# Patient Record
Sex: FEMALE | Race: BLACK OR AFRICAN AMERICAN | Employment: OTHER | URBAN - METROPOLITAN AREA
[De-identification: names, ages, dates, MRNs, and addresses within clinical notes are randomized per-mention and may not be internally consistent; named-entity substitution may affect disease eponyms.]

---

## 2023-02-07 ENCOUNTER — HOSPITAL ENCOUNTER (EMERGENCY)
Age: 73
Discharge: HOME | End: 2023-02-07
Attending: EMERGENCY MEDICINE
Payer: MEDICAID

## 2023-02-07 VITALS
HEART RATE: 79 BPM | RESPIRATION RATE: 13 BRPM | SYSTOLIC BLOOD PRESSURE: 133 MMHG | OXYGEN SATURATION: 93 % | BODY MASS INDEX: 33.31 KG/M2 | HEIGHT: 62 IN | WEIGHT: 181 LBS | TEMPERATURE: 98.2 F | DIASTOLIC BLOOD PRESSURE: 54 MMHG

## 2023-02-07 DIAGNOSIS — M79.605 PAIN OF LEFT LOWER EXTREMITY: Primary | ICD-10-CM

## 2023-02-07 LAB
ALBUMIN SERPL-MCNC: 3.2 G/DL (ref 3.4–5)
ALBUMIN/GLOB SERPL: 0.7 (ref 0.8–1.7)
ALP SERPL-CCNC: 61 U/L (ref 45–117)
ALT SERPL-CCNC: 12 U/L (ref 13–56)
ANION GAP SERPL CALC-SCNC: 6 MMOL/L (ref 3–18)
AST SERPL-CCNC: 9 U/L (ref 10–38)
BASOPHILS # BLD: 0 K/UL (ref 0–0.1)
BASOPHILS NFR BLD: 0 % (ref 0–2)
BILIRUB SERPL-MCNC: 0.3 MG/DL (ref 0.2–1)
BUN SERPL-MCNC: 16 MG/DL (ref 7–18)
BUN/CREAT SERPL: 16 (ref 12–20)
CALCIUM SERPL-MCNC: 8.9 MG/DL (ref 8.5–10.1)
CHLORIDE SERPL-SCNC: 106 MMOL/L (ref 100–111)
CO2 SERPL-SCNC: 27 MMOL/L (ref 21–32)
CREAT SERPL-MCNC: 0.99 MG/DL (ref 0.6–1.3)
DIFFERENTIAL METHOD BLD: ABNORMAL
EOSINOPHIL # BLD: 0.3 K/UL (ref 0–0.4)
EOSINOPHIL NFR BLD: 6 % (ref 0–5)
ERYTHROCYTE [DISTWIDTH] IN BLOOD BY AUTOMATED COUNT: 15.8 % (ref 11.6–14.5)
GLOBULIN SER CALC-MCNC: 4.7 G/DL (ref 2–4)
GLUCOSE SERPL-MCNC: 121 MG/DL (ref 74–99)
HCT VFR BLD AUTO: 30.4 % (ref 35–45)
HGB BLD-MCNC: 9.6 G/DL (ref 12–16)
IMM GRANULOCYTES # BLD AUTO: 0 K/UL (ref 0–0.04)
IMM GRANULOCYTES NFR BLD AUTO: 0 % (ref 0–0.5)
INR PPP: 3.1 (ref 0.8–1.2)
LYMPHOCYTES # BLD: 2.2 K/UL (ref 0.9–3.6)
LYMPHOCYTES NFR BLD: 45 % (ref 21–52)
MCH RBC QN AUTO: 24.6 PG (ref 24–34)
MCHC RBC AUTO-ENTMCNC: 31.6 G/DL (ref 31–37)
MCV RBC AUTO: 77.7 FL (ref 78–100)
MONOCYTES # BLD: 0.7 K/UL (ref 0.05–1.2)
MONOCYTES NFR BLD: 14 % (ref 3–10)
NEUTS SEG # BLD: 1.7 K/UL (ref 1.8–8)
NEUTS SEG NFR BLD: 35 % (ref 40–73)
NRBC # BLD: 0 K/UL (ref 0–0.01)
NRBC BLD-RTO: 0 PER 100 WBC
PLATELET # BLD AUTO: 214 K/UL (ref 135–420)
PLATELET COMMENTS,PCOM: ABNORMAL
PMV BLD AUTO: 11.8 FL (ref 9.2–11.8)
POTASSIUM SERPL-SCNC: 4.2 MMOL/L (ref 3.5–5.5)
PROT SERPL-MCNC: 7.9 G/DL (ref 6.4–8.2)
PROTHROMBIN TIME: 32.5 SEC (ref 11.5–15.2)
RBC # BLD AUTO: 3.91 M/UL (ref 4.2–5.3)
RBC MORPH BLD: ABNORMAL
SODIUM SERPL-SCNC: 139 MMOL/L (ref 136–145)
WBC # BLD AUTO: 4.9 K/UL (ref 4.6–13.2)

## 2023-02-07 PROCEDURE — 74011000250 HC RX REV CODE- 250: Performed by: EMERGENCY MEDICINE

## 2023-02-07 PROCEDURE — 96374 THER/PROPH/DIAG INJ IV PUSH: CPT

## 2023-02-07 PROCEDURE — 80053 COMPREHEN METABOLIC PANEL: CPT

## 2023-02-07 PROCEDURE — 85025 COMPLETE CBC W/AUTO DIFF WBC: CPT

## 2023-02-07 PROCEDURE — 74011250636 HC RX REV CODE- 250/636: Performed by: EMERGENCY MEDICINE

## 2023-02-07 PROCEDURE — 99284 EMERGENCY DEPT VISIT MOD MDM: CPT

## 2023-02-07 PROCEDURE — 85610 PROTHROMBIN TIME: CPT

## 2023-02-07 RX ORDER — LISINOPRIL 10 MG/1
20 TABLET ORAL 2 TIMES DAILY
COMMUNITY

## 2023-02-07 RX ORDER — AMLODIPINE BESYLATE 5 MG/1
TABLET ORAL DAILY
COMMUNITY

## 2023-02-07 RX ORDER — HYDROCODONE BITARTRATE AND ACETAMINOPHEN 5; 325 MG/1; MG/1
1 TABLET ORAL
Qty: 12 TABLET | Refills: 0 | Status: SHIPPED | OUTPATIENT
Start: 2023-02-07 | End: 2023-02-10

## 2023-02-07 RX ORDER — LIDOCAINE 4 G/100G
1 PATCH TOPICAL
Status: DISCONTINUED | OUTPATIENT
Start: 2023-02-07 | End: 2023-02-07 | Stop reason: HOSPADM

## 2023-02-07 RX ORDER — MORPHINE SULFATE 4 MG/ML
4 INJECTION INTRAVENOUS
Status: COMPLETED | OUTPATIENT
Start: 2023-02-07 | End: 2023-02-07

## 2023-02-07 RX ADMIN — MORPHINE SULFATE 4 MG: 4 INJECTION, SOLUTION INTRAMUSCULAR; INTRAVENOUS at 10:57

## 2023-02-07 NOTE — ED TRIAGE NOTES
70 YO F presents to the ED C/O pain behind her left knee for 2 weeks. She has Hx blood clots in her L leg last a year ago. She had it \"ballooned\" at that time and states that she has had a filter in for 20 years. Pt came to the area from [place] via bus just before Sx onset. The pain worsened yesterday.

## 2023-02-07 NOTE — ED NOTES
Discharge instructions reviewed with patient. Patient verbalized understanding. Patient advised to follow up as directed on discharge instructions. Patient denies questions, needs or concerns at this time. Patient verbalized understanding. No s/sx of distress noted. Patient is requesting to go home by Medical transport. Patient has been informed that she will incur the cost of medical transport.

## 2023-02-07 NOTE — ED PROVIDER NOTES
67-year-old female past medical history of diabetes hypertension and DVT to left lower extremity presents the emergency department with leg pain. Patient is on Xarelto states she is compliant. Patient recently drove up from [place]. She fell today and was concerned because she had leg pain. Patient states pain is worse with movement better with rest much with medications no other issues expressed. Past Medical History:   Diagnosis Date    Diabetes ([address]     Hypertension     Thromboembolus ([address]        Past Surgical History:   Procedure Laterality Date    HX ORTHOPAEDIC Right     R foot surgery    FL UNLISTED PROCEDURE VASCULAR SURGERY      IVC filter and LLE stent         Family History:   Problem Relation Age of Onset    Hypertension Other        Social History     Socioeconomic History    Marital status:      Spouse name: Not on file    Number of children: Not on file    Years of education: Not on file    Highest education level: Not on file   Occupational History    Not on file   Tobacco Use    Smoking status: Former     Types: Cigarettes     Quit date: 2/15/2008     Years since quittin.9    Smokeless tobacco: Never   Vaping Use    Vaping Use: Never used   Substance and Sexual Activity    Alcohol use: Yes     Alcohol/week: 2.0 standard drinks     Types: 2 Drinks containing 0.5 oz of alcohol per week    Drug use: Never    Sexual activity: Not on file   Other Topics Concern    Not on file   Social History Narrative    Not on file     Social Determinants of Health     Financial Resource Strain: Not on file   Food Insecurity: Not on file   Transportation Needs: Not on file   Physical Activity: Not on file   Stress: Not on file   Social Connections: Not on file   Intimate Partner Violence: Not on file   Housing Stability: Not on file         ALLERGIES: Patient has no known allergies. Review of Systems   Respiratory: Negative. Cardiovascular: Negative. Neurological: Negative.     All other systems reviewed and are negative. Vitals:    02/07/23 1027 02/07/23 1027   BP:  (!) 133/54   Pulse:  84   Resp:  16   Temp:  98.2 °F (36.8 °C)   SpO2:  100%   Weight: 82.1 kg (181 lb) 82.1 kg (181 lb)   Height: 5' 2\" (1.575 m) 5' 2\" (1.575 m)            Physical Exam  Vitals and nursing note reviewed. Constitutional:       General: She is not in acute distress. Appearance: Normal appearance. She is not ill-appearing, toxic-appearing or diaphoretic. HENT:      Head: Normocephalic and atraumatic. Nose: Nose normal.   Eyes:      Extraocular Movements: Extraocular movements intact. Cardiovascular:      Rate and Rhythm: Normal rate and regular rhythm. Pulmonary:      Effort: Pulmonary effort is normal.      Breath sounds: Normal breath sounds. Abdominal:      General: There is no distension. Palpations: Abdomen is soft. There is no mass. Tenderness: There is no abdominal tenderness. There is no right CVA tenderness, left CVA tenderness, guarding or rebound. Hernia: No hernia is present. Musculoskeletal:         General: Normal range of motion. Cervical back: Normal range of motion and neck supple. Skin:     General: Skin is warm. Capillary Refill: Capillary refill takes less than 2 seconds. Coloration: Skin is not jaundiced or pale. Findings: No bruising, erythema, lesion or rash. Neurological:      General: No focal deficit present. Mental Status: She is alert and oriented to person, place, and time. Psychiatric:         Mood and Affect: Mood normal.         Behavior: Behavior normal.        Medical Decision Making  Patient is much improved after lidocaine patch and morphine. Labs unremarkable patient has no gross deformity we will discharge her home. Amount and/or Complexity of Data Reviewed  Labs: ordered. Risk  OTC drugs. Prescription drug management.            Procedures

## 2023-04-19 PROBLEM — Z00.00 ENCOUNTER FOR PREVENTIVE HEALTH EXAMINATION: Status: ACTIVE | Noted: 2023-04-19

## 2023-04-20 ENCOUNTER — OUTPATIENT (OUTPATIENT)
Dept: OUTPATIENT SERVICES | Facility: HOSPITAL | Age: 73
LOS: 1 days | End: 2023-04-20
Payer: MEDICARE

## 2023-04-20 ENCOUNTER — RESULT REVIEW (OUTPATIENT)
Age: 73
End: 2023-04-20

## 2023-04-20 ENCOUNTER — APPOINTMENT (OUTPATIENT)
Dept: ORTHOPEDIC SURGERY | Facility: CLINIC | Age: 73
End: 2023-04-20
Payer: MEDICARE

## 2023-04-20 VITALS
HEART RATE: 83 BPM | SYSTOLIC BLOOD PRESSURE: 146 MMHG | HEIGHT: 62 IN | OXYGEN SATURATION: 100 % | BODY MASS INDEX: 32.57 KG/M2 | DIASTOLIC BLOOD PRESSURE: 72 MMHG | WEIGHT: 177 LBS

## 2023-04-20 DIAGNOSIS — Z79.52 LONG TERM (CURRENT) USE OF SYSTEMIC STEROIDS: ICD-10-CM

## 2023-04-20 DIAGNOSIS — M87.00 IDIOPATHIC ASEPTIC NECROSIS OF UNSPECIFIED BONE: ICD-10-CM

## 2023-04-20 DIAGNOSIS — D68.59 OTHER PRIMARY THROMBOPHILIA: ICD-10-CM

## 2023-04-20 DIAGNOSIS — I82.512 CHRONIC EMBOLISM AND THROMBOSIS OF LEFT FEMORAL VEIN: ICD-10-CM

## 2023-04-20 PROCEDURE — 99204 OFFICE O/P NEW MOD 45 MIN: CPT

## 2023-04-20 PROCEDURE — 73564 X-RAY EXAM KNEE 4 OR MORE: CPT | Mod: 26,50

## 2023-04-20 PROCEDURE — 73564 X-RAY EXAM KNEE 4 OR MORE: CPT

## 2023-04-23 NOTE — ASSESSMENT
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 \par Follow up after MRI

## 2023-04-23 NOTE — HISTORY OF PRESENT ILLNESS
[de-identified] : CHAVO MINER is a 72 year old female who presents with left knee pain.\par States the onset of pain was several years ago with recent exacerbation over 1 month.\par No antecedent trauma or injury. \par Had several visits to ED at [facility].\par Hx of chronic DVT with recent thrombectomy,  IVC filter removal and stent placement.\par No Hx of sickle cell, HIV.\par Hx asthma requiring chronic steroid treatment. Now managed with Dupixent.\par Pain is anteromedial \par Pain is nonradiating.\par There is associated swelling, stiffness\par There is no associated numbness, paraesthesia or weakness.\par Exacerbating factors are standing, walking for prolonged periods, climbing stairs, descending stairs, rising from seated position.\par Taking Percocet 5-325mg TID \par Has Pain Management appointment in 1 week\par Patient ambulates with cane.\par

## 2023-04-23 NOTE — PHYSICAL EXAM
[de-identified] : General: Well-nourished, well-developed, alert, and in no acute distress.\par Head: Normocephalic.\par Eyes: Pupils equal, extraocular muscles intact, normal sclera.\par Nose: No nasal discharge.\par Cardiovascular: Extremities are warm and well perfused. Distal pulses are symmetric bilaterally.\par Respiratory: No labored breathing.\par Extremities: Sensation is intact distally bilaterally. Distal pulses are symmetric bilaterally\par Lymphatic: No regional lymphadenopathy, no lymphedema\par Neurologic: No focal deficits\par Skin: Normal skin color, texture, and turgor\par Psychiatric: Normal affect \par \par MSK:\par Examination of left knee:\par \par Gait antalgic\par Moderate effusion\par No erythema, hematoma or skin lesion\par Tender to palpation:medial joint line, lateral joint line, popliteal fossa\par Nontender to palpation:  quad tendon, patellar tendon\par No warmth\par No Baker's cyst palpable\par ROM: 5-90\par Moderate patellar crepitus\par \par Log roll negative\par Lachman negative\par Anterior drawer negative\par Posterior drawer negative\par Varus/valgus stress negative at 0 and 30 deg\par Shabbir  negative\par Patellar grind negative\par \par \par Examination of right knee:\par \par No effusion, erythema, hematoma or skin lesion\par Nontender to palpation: medial joint line, lateral joint line, medial patellar facet, lateral patellar facet, quad tendon, patellar tendon, pes, Gerdy's tubercle, tibial tuberosity, popliteal fossa, hamstrings, ITB \par No warmth\par No Baker's cyst palpable\par ROM: 0-110\par No patellar crepitus\par \par Log roll negative\par Lachman negative\par Anterior drawer negative\par Posterior drawer negative\par Varus/valgus stress negative at 0 and 30 deg\par Shabbir  negative\par Patellar grind negative \par \par Sensation is intact to light touch over the superficial and deep peroneal nerve distributions and the posterior tibial nerve distribution. Capillary refill is less than two seconds. Posterior tibial and dorsalis pedis pulses 2+ equal bilaterally. No calf swelling or tenderness bilaterally. Strength testing shows  Hip flexion 5/5, Hip abduction 5/5, Hip abduction 5/5, Knee Extension 5/5, Knee Flexion 5/5, dorsiflexion 5/5, plantar flexion 5/5, EHL 5/5\par Reflexes: Patellar 2+, Achilles 2+\par   [de-identified] : XR bilateral knee (4/20/23): There is no evidence of fracture or dislocation. Bilateral femoral and tibial bone infarcts. Medial joint space narrowing with subchondral sclerosis and osteophytosis. There are superior patellar enthesophytes.

## 2023-04-28 ENCOUNTER — FORM ENCOUNTER (OUTPATIENT)
Age: 73
End: 2023-04-28

## 2023-04-28 VITALS
DIASTOLIC BLOOD PRESSURE: 84 MMHG | OXYGEN SATURATION: 99 % | TEMPERATURE: 98 F | WEIGHT: 171.96 LBS | HEART RATE: 91 BPM | RESPIRATION RATE: 16 BRPM | SYSTOLIC BLOOD PRESSURE: 168 MMHG

## 2023-04-28 PROCEDURE — 93971 EXTREMITY STUDY: CPT | Mod: 26,LT

## 2023-04-28 PROCEDURE — 99285 EMERGENCY DEPT VISIT HI MDM: CPT

## 2023-04-28 NOTE — ED PROVIDER NOTE - PHYSICAL EXAMINATION
General:  Well appearing, no distress  HEENT:  No conjunctival injection, neck supple, no congestion   Chest:  Non-tender, no crepitance  Lungs:  Clear to auscultation bilaterally   Heart:  s1s2 normal, no murmur  Abdomen:  soft, non-tender, non-distended  :  Deferred  Rectal:  Deferred  Extremities: Mild ble edema.  Patient reacts in pain with even very light touch to all areas of LLE.  There is no erythema, distal pulses intact   Neuro:  Alert, conversant, motor/sensory grossly intact

## 2023-04-28 NOTE — ED ADULT NURSE NOTE - NSIMPLEMENTINTERV_GEN_ALL_ED
Implemented All Fall with Harm Risk Interventions:  [place] to call system. Call bell, personal items and telephone within reach. Instruct patient to call for assistance. Room bathroom lighting operational. Non-slip footwear when patient is off stretcher. Physically safe environment: no spills, clutter or unnecessary equipment. Stretcher in lowest position, wheels locked, appropriate side rails in place. Provide visual cue, wrist band, yellow gown, etc. Monitor gait and stability. Monitor for mental status changes and reorient to person, place, and time. Review medications for side effects contributing to fall risk. Reinforce activity limits and safety measures with patient and family. Provide visual clues: red socks.

## 2023-04-28 NOTE — ED PROVIDER NOTE - PROGRESS NOTE DETAILS
Director - pt received from night team pending vasc consult recs.  Pt sleeping comfortably.  VS, labs, imaging reviewed.  Await vasc. Hugo - Vascular stating area of superficial stenosis visualized on CTa is likely not source of pt's pain as she is experiencing symptoms primarily in the thigh. State pt has good perfusion in the foot, there is no concern for complete occlusion. Recommend ortho consult for bone infarct, CT notable for "serpiginous sclerotic densities are again seen within the left femur and proximal and distal tibia, consistent with bone infarct." Hugo - Discussed with orthopedics, will evaluate pt and provide recommendations. Requesting XR pelvis, L hip and L femur. Huog - Reached out to Ortho for plan. Have not evaluated pt but will see later today in ED. Unable to provide further recs at this time. Hugo - Ortho feels pain is likely related to underlying bony infarcts, feel this could be related to long term steroid use vs malignancy. Pt previously evaluated in ortho clinic last week and referred for MR L knee and hematology evaluation. Pt reassessed and c/o persistent pain. She is unable to ambulate despite multiple doses of morphine. She lives alone and is typically independent in ADLs, does not use an assistive device. Has been taking percocet without relief. Discussed with pt and will admit to Medicine for intractable pain, case management evaluation.

## 2023-04-28 NOTE — ED PROVIDER NOTE - OBJECTIVE STATEMENT
Patient PMH DVT LLE in february after a bus trip  The first week of March, she had a procedure at [place] to remove the clot  The third week of March the pain started again and has been worse since the first week in April  She last saw the surgeon 2 weeks after she was discharged.  She was supposed to have another sonogram but couldn't because of insurance   Last week she went to  and the person who saw her told her that she could have a problem with the circulation to the bone    Accordingly, she saw Orthopedist Dr. Menchaca and was told she needed to have an MRI and to also see pain management  She was given oxycodone pending her pain mgmt appointment  She reports that the appointment was supposed to be today, but that it was rescheduled for May 11th and so she came to the ER for pain mgmt.  She has no cp, no sob, no abd pain  No fever, cough, cold symptoms  No color change, numbness or weakness to the LLE

## 2023-04-28 NOTE — ED ADULT TRIAGE NOTE - CHIEF COMPLAINT QUOTE
Pt c/o left knee pain since DVT surgery in March. Pt states, "they told me I needed to come because the blood is not flowing to the bone." Denies chest pain, shortness of breath.

## 2023-04-28 NOTE — ED ADULT NURSE NOTE - OBJECTIVE STATEMENT
73y female c/o L knee pain. states she had DVT surgery in march after having a "blood clot for 22 years." pt states, "I was seen at urgent care and they told me there is no blood flow to my bone." states she has not been able to ambulate in 3 weeks. on xarelto. denies CP, SOB, n/v/d, HA, numbness/tingling, f/c. No acute distress noted at this time.

## 2023-04-28 NOTE — ED PROVIDER NOTE - CLINICAL SUMMARY MEDICAL DECISION MAKING FREE TEXT BOX
Patient with DVT with clot extraction and ongoing pain.  Will reassess with repeat sono and give pain medications.

## 2023-04-29 ENCOUNTER — INPATIENT (INPATIENT)
Facility: HOSPITAL | Age: 73
LOS: 3 days | Discharge: HOME HEALTH | DRG: 554 | End: 2023-05-03
Attending: INTERNAL MEDICINE | Admitting: GENERAL ACUTE CARE HOSPITAL
Payer: MEDICARE

## 2023-04-29 ENCOUNTER — FORM ENCOUNTER (OUTPATIENT)
Age: 73
End: 2023-04-29

## 2023-04-29 DIAGNOSIS — D64.9 ANEMIA, UNSPECIFIED: ICD-10-CM

## 2023-04-29 DIAGNOSIS — I82.409 ACUTE EMBOLISM AND THROMBOSIS OF UNSPECIFIED DEEP VEINS OF UNSPECIFIED LOWER EXTREMITY: ICD-10-CM

## 2023-04-29 DIAGNOSIS — M87.00 IDIOPATHIC ASEPTIC NECROSIS OF UNSPECIFIED BONE: ICD-10-CM

## 2023-04-29 DIAGNOSIS — I73.9 PERIPHERAL VASCULAR DISEASE, UNSPECIFIED: ICD-10-CM

## 2023-04-29 DIAGNOSIS — J45.909 UNSPECIFIED ASTHMA, UNCOMPLICATED: ICD-10-CM

## 2023-04-29 DIAGNOSIS — Z98.891 HISTORY OF UTERINE SCAR FROM PREVIOUS SURGERY: Chronic | ICD-10-CM

## 2023-04-29 DIAGNOSIS — Z29.9 ENCOUNTER FOR PROPHYLACTIC MEASURES, UNSPECIFIED: ICD-10-CM

## 2023-04-29 DIAGNOSIS — I10 ESSENTIAL (PRIMARY) HYPERTENSION: ICD-10-CM

## 2023-04-29 DIAGNOSIS — E11.9 TYPE 2 DIABETES MELLITUS WITHOUT COMPLICATIONS: ICD-10-CM

## 2023-04-29 LAB — GLUCOSE BLDC GLUCOMTR-MCNC: 110 MG/DL — HIGH (ref 70–99)

## 2023-04-29 PROCEDURE — 73502 X-RAY EXAM HIP UNI 2-3 VIEWS: CPT | Mod: 26,LT

## 2023-04-29 PROCEDURE — 73706 CT ANGIO LWR EXTR W/O&W/DYE: CPT | Mod: 26,LT,MA

## 2023-04-29 PROCEDURE — 73701 CT LOWER EXTREMITY W/DYE: CPT | Mod: 26,59,LT,MA

## 2023-04-29 PROCEDURE — 99222 1ST HOSP IP/OBS MODERATE 55: CPT

## 2023-04-29 PROCEDURE — 73552 X-RAY EXAM OF FEMUR 2/>: CPT | Mod: 26,LT

## 2023-04-29 PROCEDURE — 99222 1ST HOSP IP/OBS MODERATE 55: CPT | Mod: GC

## 2023-04-29 RX ORDER — SENNA PLUS 8.6 MG/1
2 TABLET ORAL AT BEDTIME
Refills: 0 | Status: DISCONTINUED | OUTPATIENT
Start: 2023-04-29 | End: 2023-05-03

## 2023-04-29 RX ORDER — SODIUM CHLORIDE 9 MG/ML
1000 INJECTION, SOLUTION INTRAVENOUS
Refills: 0 | Status: DISCONTINUED | OUTPATIENT
Start: 2023-04-29 | End: 2023-05-03

## 2023-04-29 RX ORDER — DEXTROSE 50 % IN WATER 50 %
25 SYRINGE (ML) INTRAVENOUS ONCE
Refills: 0 | Status: DISCONTINUED | OUTPATIENT
Start: 2023-04-29 | End: 2023-05-03

## 2023-04-29 RX ORDER — ACETAMINOPHEN 500 MG
650 TABLET ORAL EVERY 6 HOURS
Refills: 0 | Status: DISCONTINUED | OUTPATIENT
Start: 2023-04-29 | End: 2023-04-30

## 2023-04-29 RX ORDER — TIOTROPIUM BROMIDE 18 UG/1
2 CAPSULE ORAL; RESPIRATORY (INHALATION) DAILY
Refills: 0 | Status: DISCONTINUED | OUTPATIENT
Start: 2023-04-29 | End: 2023-05-03

## 2023-04-29 RX ORDER — ATORVASTATIN CALCIUM 80 MG/1
40 TABLET, FILM COATED ORAL AT BEDTIME
Refills: 0 | Status: DISCONTINUED | OUTPATIENT
Start: 2023-04-29 | End: 2023-05-03

## 2023-04-29 RX ORDER — LISINOPRIL 2.5 MG/1
40 TABLET ORAL EVERY 24 HOURS
Refills: 0 | Status: DISCONTINUED | OUTPATIENT
Start: 2023-04-29 | End: 2023-05-03

## 2023-04-29 RX ORDER — MORPHINE SULFATE 50 MG/1
4 CAPSULE, EXTENDED RELEASE ORAL ONCE
Refills: 0 | Status: DISCONTINUED | OUTPATIENT
Start: 2023-04-29 | End: 2023-04-29

## 2023-04-29 RX ORDER — GLUCAGON INJECTION, SOLUTION 0.5 MG/.1ML
1 INJECTION, SOLUTION SUBCUTANEOUS ONCE
Refills: 0 | Status: DISCONTINUED | OUTPATIENT
Start: 2023-04-29 | End: 2023-05-03

## 2023-04-29 RX ORDER — BUDESONIDE AND FORMOTEROL FUMARATE DIHYDRATE 160; 4.5 UG/1; UG/1
2 AEROSOL RESPIRATORY (INHALATION)
Refills: 0 | Status: DISCONTINUED | OUTPATIENT
Start: 2023-04-29 | End: 2023-05-03

## 2023-04-29 RX ORDER — POLYETHYLENE GLYCOL 3350 17 G/17G
17 POWDER, FOR SOLUTION ORAL
Refills: 0 | Status: DISCONTINUED | OUTPATIENT
Start: 2023-04-29 | End: 2023-05-03

## 2023-04-29 RX ORDER — INSULIN LISPRO 100/ML
VIAL (ML) SUBCUTANEOUS
Refills: 0 | Status: DISCONTINUED | OUTPATIENT
Start: 2023-04-29 | End: 2023-05-03

## 2023-04-29 RX ORDER — AMLODIPINE BESYLATE 2.5 MG/1
5 TABLET ORAL EVERY 24 HOURS
Refills: 0 | Status: DISCONTINUED | OUTPATIENT
Start: 2023-04-30 | End: 2023-05-03

## 2023-04-29 RX ORDER — DEXTROSE 50 % IN WATER 50 %
15 SYRINGE (ML) INTRAVENOUS ONCE
Refills: 0 | Status: DISCONTINUED | OUTPATIENT
Start: 2023-04-29 | End: 2023-05-03

## 2023-04-29 RX ORDER — ALBUTEROL 90 UG/1
2 AEROSOL, METERED ORAL EVERY 6 HOURS
Refills: 0 | Status: DISCONTINUED | OUTPATIENT
Start: 2023-04-29 | End: 2023-05-03

## 2023-04-29 RX ORDER — DEXTROSE 50 % IN WATER 50 %
12.5 SYRINGE (ML) INTRAVENOUS ONCE
Refills: 0 | Status: DISCONTINUED | OUTPATIENT
Start: 2023-04-29 | End: 2023-05-03

## 2023-04-29 RX ORDER — RIVAROXABAN 15 MG-20MG
20 KIT ORAL DAILY
Refills: 0 | Status: DISCONTINUED | OUTPATIENT
Start: 2023-04-29 | End: 2023-05-03

## 2023-04-29 RX ADMIN — RIVAROXABAN 20 MILLIGRAM(S): KIT at 21:19

## 2023-04-29 RX ADMIN — MORPHINE SULFATE 4 MILLIGRAM(S): 50 CAPSULE, EXTENDED RELEASE ORAL at 09:05

## 2023-04-29 RX ADMIN — MORPHINE SULFATE 4 MILLIGRAM(S): 50 CAPSULE, EXTENDED RELEASE ORAL at 16:34

## 2023-04-29 RX ADMIN — MORPHINE SULFATE 4 MILLIGRAM(S): 50 CAPSULE, EXTENDED RELEASE ORAL at 13:35

## 2023-04-29 RX ADMIN — MORPHINE SULFATE 4 MILLIGRAM(S): 50 CAPSULE, EXTENDED RELEASE ORAL at 17:08

## 2023-04-29 RX ADMIN — SENNA PLUS 2 TABLET(S): 8.6 TABLET ORAL at 21:20

## 2023-04-29 RX ADMIN — LISINOPRIL 40 MILLIGRAM(S): 2.5 TABLET ORAL at 21:20

## 2023-04-29 RX ADMIN — MORPHINE SULFATE 4 MILLIGRAM(S): 50 CAPSULE, EXTENDED RELEASE ORAL at 04:32

## 2023-04-29 RX ADMIN — ATORVASTATIN CALCIUM 40 MILLIGRAM(S): 80 TABLET, FILM COATED ORAL at 21:20

## 2023-04-29 RX ADMIN — MORPHINE SULFATE 4 MILLIGRAM(S): 50 CAPSULE, EXTENDED RELEASE ORAL at 00:38

## 2023-04-29 RX ADMIN — MORPHINE SULFATE 4 MILLIGRAM(S): 50 CAPSULE, EXTENDED RELEASE ORAL at 09:37

## 2023-04-29 RX ADMIN — MORPHINE SULFATE 4 MILLIGRAM(S): 50 CAPSULE, EXTENDED RELEASE ORAL at 13:00

## 2023-04-29 NOTE — H&P ADULT - PROBLEM SELECTOR PLAN 4
History of many asthma exacerbations in the past with a lot of steroid use. Patient reports that she is now in remission since she stopped smoking 10 years ago.  Home meds: albuterol PRN, trelegy  - continue home meds Hgb 8.2 on admission w/ low MCV. Unknown baseline. Per patient she has heard of having a low blood count in the past but does not know why.  - f/u iron panel

## 2023-04-29 NOTE — H&P ADULT - NSICDXPASTMEDICALHX_GEN_ALL_CORE_FT
PAST MEDICAL HISTORY:  Asthma     DVT, lower extremity     HTN (hypertension)     PAD (peripheral artery disease)

## 2023-04-29 NOTE — H&P ADULT - PROBLEM SELECTOR PLAN 7
F: PO intake  E: replete as needed  N: kosher diet   DVT: xarelto 20mg daily Home med: metformin 1000mg daily. Patient reports that she does not have diabetes, she was just put on metformin because she has been on steroids so much in the past that her sugar used to be elevated.  - mISS  - f/u A1C

## 2023-04-29 NOTE — H&P ADULT - PROBLEM SELECTOR PLAN 6
Home med: metformin 1000mg daily. Patient reports that she does not have diabetes, she was just put on metformin because she has been on steroids so much in the past that her sugar used to be elevated.  - mISS  - f/u A1C Home meds: lisinopril 40mg qd, amlodipine 5mg qd  - continue home meds

## 2023-04-29 NOTE — H&P ADULT - PROBLEM SELECTOR PLAN 1
CT LE showing bone infarct in b/l femurs and tibias. CTA LLE showing severe atherosclerotic disease in L superficial femoral artery. Pain 2/2 bone infarcts vs atherosclerotic disease. Ortho and vascular consulted in ED.   Per ED note, ortho feels bone infarcts caused by long term steroid use (lots of steroid use in asthma hx) vs malignancy. Vascular feels the superficial stenosis is unlikely to be cause of pain.   - pain management: tylenol 650mg PO for mild pain, oxycodone 5mg/325mg q6 PRN for moderate pain, oxycodone 10mg q6 PRN for severe pain  - f/u ortho recs  - PT consult CT LE showing bone infarct in b/l femurs and tibias. CTA LLE showing severe atherosclerotic disease in L superficial femoral artery. Pain 2/2 bone infarcts vs atherosclerotic disease. Ortho and vascular consulted in ED.   Per ED note, ortho feels bone infarcts caused by long term steroid use (lots of steroid use in asthma hx) vs malignancy. Vascular feels the superficial stenosis is unlikely to be cause of pain.   Consider malignancy vs hypercoaguability  - pain management: tylenol 650mg PO for mild pain, oxycodone 5mg/325mg q6 PRN for moderate pain, oxycodone 10mg q6 PRN for severe pain  - f/u ortho recs  - PT consult  - collateral on cancer screenings, per patient colonoscopy was many years back. mammogram?  - hem onc consult in am for hypercoaguable w/u

## 2023-04-29 NOTE — H&P ADULT - NSHPREVIEWOFSYSTEMS_GEN_ALL_CORE
REVIEW OF SYSTEMS:  CONSTITUTIONAL: No weakness, fevers, chills, changes in weight  EYES/ENT: No visual changes;  No vertigo or throat pain   NECK: No pain or stiffness  RESPIRATORY: No cough, wheezing, hemoptysis; No shortness of breath  CARDIOVASCULAR: no chest pain or palpitations  GASTROINTESTINAL: No abdominal or epigastric pain. No nausea, vomiting, or hematemesis; No diarrhea or constipation. No melena or hematochezia.  GENITOURINARY: No dysuria, frequency or hematuria  NEUROLOGICAL: No numbness or weakness  SKIN: No itching, rashes

## 2023-04-29 NOTE — H&P ADULT - HISTORY OF PRESENT ILLNESS
Patient is a 72 y/o F w/ PMH of DVT s/p IVC filter then clot and IVC filter removal, asthma and HTN  Patient is a 72 y/o F w/ PMH of DVT s/p IVC filter then clot and IVC filter removal, asthma and HTN who is presenting to the ED w/ L knee pain worsening over the past 2 months. In February patient was on a 6 hour bus ride, next day had severe L knee pain. Thought it could be related to blood clot, so went to doctor who did a clot removal procedure and removed her IVC filter, but this did not help the pain. Pain was controlled for a week because she was on pain medication, but then worsened once the medication stopped. Went to urgent care last week and was told it could be a problem with blood flow to her bone. Went to orthopedist who rec MRI and pain management. Prescribed oxy/acetaminophen 5mg pending pain management appointment, which was supposed to be Friday, but appointment was reschedule to May 11 and pain became unbearable. Denies any other symptoms.     In ED, VS include Temp 98.2F, HR 91/min, /84, RR 16/min, O2 sat 99% on RA. Labs significant for Hgb 8.2. CT and CT angio of LE done, along w/ XR hip, femur and knee. Ortho and vascular consulted. Given morphine 4mg IVP x4.

## 2023-04-29 NOTE — CONSULT NOTE ADULT - SUBJECTIVE AND OBJECTIVE BOX
Orthopaedic Surgery Consult Note    CC: Patient is a 73y old  Female who presents with a chief complaint of     HPI:  73y old Female    ROS: Positive for  Denies fevers, chills, headache, dizziness, chest pain, shortness of breath, nausea, vomiting.   All other systems negative, except for above.     Allergies    ibuprofen (Unknown)  penicillin (Unknown)    Intolerances      PAST MEDICAL & SURGICAL HISTORY:  DVT, lower extremity        Social History:    FAMILY HISTORY:    Medications:     Vital Signs Last 24 Hrs  T(C): 37 (29 Apr 2023 08:47), Max: 37 (29 Apr 2023 08:47)  T(F): 98.6 (29 Apr 2023 08:47), Max: 98.6 (29 Apr 2023 08:47)  HR: 76 (29 Apr 2023 08:47) (75 - 91)  BP: 161/79 (29 Apr 2023 08:47) (121/70 - 168/84)  BP(mean): --  RR: 18 (29 Apr 2023 08:47) (16 - 20)  SpO2: 100% (29 Apr 2023 08:47) (99% - 100%)    Parameters below as of 29 Apr 2023 08:47  Patient On (Oxygen Delivery Method): room air        PE:  General: Well developed, well nourished, in no acute distress, comfortable  Neuro: Alert, oriented x 3  Psych: Normal mood & affect   Skin: Warm, dry, extremities well perfused, no obvious rash  MSK:    -Inspection/palpation:    -Sensation:    -Motor:     -ROM:    -Pulses:                           8.2    7.06  )-----------( 229      ( 28 Apr 2023 19:07 )             27.2     04-28    137  |  102  |  11  ----------------------------<  106<H>  4.7   |  25  |  0.69    Ca    8.5      28 Apr 2023 19:07    TPro  7.3  /  Alb  3.6  /  TBili  <0.2  /  DBili  x   /  AST  16  /  ALT  8<L>  /  AlkPhos  68  04-28    PT/INR - ( 28 Apr 2023 19:07 )   PT: 14.4 sec;   INR: 1.21          PTT - ( 28 Apr 2023 19:07 )  PTT:22.1 sec    Imaging:     A/P: 73yFemale      Reviewed imaging and lab data   Risks and benefits were discussed for   Above plan discussed with Dr. Sun Pager [phone]    ___ minutes spent on total encounter, over 50% of the visit was spent counseling and/or coordinating care.      Orthopaedic Surgery Consult Note    CC: Patient is a 73y old  Female pmh chronic steroid use for asthma and DVT w. recent thrombectomy and IVC filter removal  presents with a chief complaint of chronic LLE pain, localized diffusely throughout distal thigh w/ multiple visits to [place] ED. Onset years ago, exacerbated by walking, sharp pain 6/10, can only ambulate .75 block before has to stop. Has seen Dr. Hatch at clinic w/ xray, MR ordered outpt.     Normally ambulates with cane she presents with today.    Denies specific trauma.       ROS: Positive for  Denies fevers, chills, headache, dizziness, chest pain, shortness of breath, nausea, vomiting.   All other systems negative, except for above.     Allergies    ibuprofen (Unknown)  penicillin (Unknown)    Intolerances      PAST MEDICAL & SURGICAL HISTORY:  DVT, lower extremity        Social History:    FAMILY HISTORY:    Medications:     Vital Signs Last 24 Hrs  T(C): 37 (29 Apr 2023 08:47), Max: 37 (29 Apr 2023 08:47)  T(F): 98.6 (29 Apr 2023 08:47), Max: 98.6 (29 Apr 2023 08:47)  HR: 76 (29 Apr 2023 08:47) (75 - 91)  BP: 161/79 (29 Apr 2023 08:47) (121/70 - 168/84)  BP(mean): --  RR: 18 (29 Apr 2023 08:47) (16 - 20)  SpO2: 100% (29 Apr 2023 08:47) (99% - 100%)    Parameters below as of 29 Apr 2023 08:47  Patient On (Oxygen Delivery Method): room air        PE:  General: Well developed, well nourished, in no acute distress, comfortable  Neuro: Alert, oriented x 3  Psych: Normal mood & affect   Skin: Warm, dry, extremities well perfused, no obvious rash  MSK:   B/L LE: sensation intact, DP 2+, brisk cap refill, EHL/FHL/TA/GS 5/5  -mcmurrays, joint line tenderness, pes anserine TTP, a/p drawer test, varus/valgus instability  no instability or TTP appreiacted in quad, patella tendon. +1 quad of patella  w/ lateral/medial movements, neg J sign  TTP distal quad circumferentially bout 3cm above superior pole of patella   Appears superficial pain                        8.2    7.06  )-----------( 229      ( 28 Apr 2023 19:07 )             27.2     04-28    137  |  102  |  11  ----------------------------<  106<H>  4.7   |  25  |  0.69    Ca    8.5      28 Apr 2023 19:07    TPro  7.3  /  Alb  3.6  /  TBili  <0.2  /  DBili  x   /  AST  16  /  ALT  8<L>  /  AlkPhos  68  04-28    PT/INR - ( 28 Apr 2023 19:07 )   PT: 14.4 sec;   INR: 1.21          PTT - ( 28 Apr 2023 19:07 )  PTT:22.1 sec    Imaging:   bilateral radioopaque lesions in femur/tibia, distal diaphysis femur/metaphysis, intramedullay  Proximal 1/3 tibia, intramedullary, juxtaposed with posterior cortex  Several lesions present suspicious for bone island infarcts    A/P: 73yFemale w/ chronic LLE distal thigh pain presenting with radiopaoq lesion bilateral distal femur/proximal tibia  -WBAT, +/- PT eval  -No acute orthopedic intervention  -R/o poss oncologic etiology-followup with Dr. Yost outpatient to review MR images after patient has imaging completed outpatient  Reviewed imaging and lab data   Risks and benefits were discussed for 35 minutes  Above plan discussed with Dr. Barton  Ortho Pager [phone]    ___ minutes spent on total encounter, over 50% of the visit was spent counseling and/or coordinating care.

## 2023-04-29 NOTE — H&P ADULT - ASSESSMENT
72 y/o F w/ PMH of DVT s/p IVC filter then clot and IVC filter removal, asthma and HTN who is presenting to the ED w/ L knee pain worsening over the past 2 months, imaging showing bone infarct, admitted for pain control.

## 2023-04-29 NOTE — ED ADULT NURSE REASSESSMENT NOTE - NS ED NURSE REASSESS COMMENT FT1
Report received from MINNIE Boyer. Pt resting comfortably in stretcher, respirations even and unlabored. Updated on plan of care.

## 2023-04-29 NOTE — H&P ADULT - PROBLEM SELECTOR PLAN 2
CTA showing severe atherosclerotic disease in L superficial femoral artery. Vascular consulted in ED, no acute surgical intervention. Can follow up w/ Dr. Hughes as outpatient.  - continue home med xarelto 20mg qd Hx of DVT 20 years ago, trialed coumadin but had IVC filter placed instead. IVC filter and clot removed in 2/2023. Unknown if DVT was provoked or not.  - continue xarelto 20mg daily  - hem onc consult in am for hypercoaguable w/u

## 2023-04-29 NOTE — H&P ADULT - PROBLEM SELECTOR PLAN 3
Hgb 8.2 on admission w/ low MCV. Unknown baseline. Per patient she has heard of having a low blood count in the past but does not know why.  - f/u iron panel CTA showing severe atherosclerotic disease in L superficial femoral artery. Vascular consulted in ED, no acute surgical intervention. Can follow up w/ Dr. Hughes as outpatient.  - continue home med xarelto 20mg qd  - started on atorvastatin 40mg daily  - ask vascular if should start aspirin

## 2023-04-29 NOTE — H&P ADULT - NSHPSOCIALHISTORY_GEN_ALL_CORE
Patient lives in the [place] in an apartment by herself. Drinks alcohol on social occasions, has 1 prosper [employer]ina. Previously smoked cigarettes 1ppd for 35 years, quit 10 years ago. Denies any drug use.

## 2023-04-29 NOTE — H&P ADULT - PROBLEM SELECTOR PLAN 5
Home meds: lisinopril 40mg qd, amlodipine 5mg qd  - continue home meds History of many asthma exacerbations in the past with a lot of steroid use. Patient reports that she is now in remission since she stopped smoking 10 years ago.  Home meds: albuterol PRN, trelegy  - continue home meds

## 2023-04-29 NOTE — H&P ADULT - NSHPLABSRESULTS_GEN_ALL_CORE
LABS:                         8.2    7.06  )-----------( 229      ( 28 Apr 2023 19:07 )             27.2     04-28    137  |  102  |  11  ----------------------------<  106<H>  4.7   |  25  |  0.69    Ca    8.5      28 Apr 2023 19:07    TPro  7.3  /  Alb  3.6  /  TBili  <0.2  /  DBili  x   /  AST  16  /  ALT  8<L>  /  AlkPhos  68  04-28    PT/INR - ( 28 Apr 2023 19:07 )   PT: 14.4 sec;   INR: 1.21          PTT - ( 28 Apr 2023 19:07 )  PTT:22.1 sec              RADIOLOGY, EKG & ADDITIONAL TESTS: Reviewed.

## 2023-04-29 NOTE — H&P ADULT - NSHPPHYSICALEXAM_GEN_ALL_CORE
VITAL SIGNS:  T(C): 37.2 (04-29-23 @ 12:51), Max: 37.2 (04-29-23 @ 12:51)  T(F): 99 (04-29-23 @ 12:51), Max: 99 (04-29-23 @ 12:51)  HR: 85 (04-29-23 @ 16:37) (75 - 85)  BP: 112/77 (04-29-23 @ 16:37) (112/77 - 161/79)  BP(mean): --  RR: 18 (04-29-23 @ 16:37) (16 - 20)  SpO2: 100% (04-29-23 @ 16:37) (99% - 100%)  Wt(kg): --    PHYSICAL EXAM:  Constitutional: laying in bed, in pain  Head: NC/AT  Eyes: PERRL, EOMI, anicteric sclera  ENT: no nasal discharge; uvula midline, no oropharyngeal erythema or exudates; MMM  Neck: supple; no JVD or thyromegaly  Respiratory: CTA B/L; no W/R/R, no retractions  Cardiac: +S1/S2; RRR; no M/R/G; PMI non-displaced  Gastrointestinal: abdomen soft, NT/ND; no rebound or guarding  Extremities: WWP, no clubbing or cyanosis; no peripheral edema  Musculoskeletal: ROM L knee limited 2/2 pain; no joint swelling, tenderness or erythema  Vascular: 2+ radial, DP pulses B/L  Neurologic: AAOx3; CNII-XII grossly intact; no focal deficits

## 2023-04-29 NOTE — CONSULT NOTE ADULT - SUBJECTIVE AND OBJECTIVE BOX
Attending: Dr. Hughes    HPI: Pt is a 72yo F with pmhx of HTN and two episodes DVT (1st episode 22 years ago, 2nd episode Feb/2023 ) presents to [facility] ED with LLE pain. Pt states after bus trip to [place] (6 hrs) in february she got a LLE DVT, and first week of march she had procedure at [facility] to remove the clot and was discharged on Xarelto. Pt states 2 weeks after discharge the LLE pain started again and has been worsening progressively to point where she cannot bear weight on LLE. Pt last saw vascular surgeon 2 weeks after discharged, and was not able to get follow up sonogram due to insurance. Pt states she was prescribed oxycodone pending pain management appointment, she was supposed to have pain management appointment today but it was rescheduled so presented to [facility] ED for pain management. Pt denies fever, chills, swelling, weakness, paresthesia to lower extremities, claudication, SOB, CP.    In ED, vital signs stable. Labs significant for Hgb 8.2, WBC 7.06, Cr 0.69. Duplex negative for LLE DVT. CTA revealed severe atherosclerotic disease in left superficial femoral artery.       PAST MEDICAL & SURGICAL HISTORY:  DVT, lower extremity          Allergies  ibuprofen (Unknown)  penicillin (Unknown)    Intolerances        Vital Signs Last 24 Hrs  T(C): 36.9 (28 Apr 2023 21:27), Max: 36.9 (28 Apr 2023 21:27)  T(F): 98.4 (28 Apr 2023 21:27), Max: 98.4 (28 Apr 2023 21:27)  HR: 75 (28 Apr 2023 23:20) (75 - 91)  BP: 121/70 (28 Apr 2023 23:20) (121/70 - 168/84)  BP(mean): --  RR: 20 (28 Apr 2023 23:20) (16 - 20)  SpO2: 99% (28 Apr 2023 23:20) (99% - 100%)    Parameters below as of 28 Apr 2023 23:20  Patient On (Oxygen Delivery Method): room air        I&O's Summary      Physical Exam:  General: NAD, resting comfortably  Pulmonary: normal resp effort  Cardiovascular: NSR  Abdominal: soft, NT/ND  Extremities: LLE: pt has pain to light touch of popliteal region. No erythema or edema. Motor and sensory grossly intact b/l.  Pulses: Left DP/PT triphasic    LABS:                        8.2    7.06  )-----------( 229      ( 28 Apr 2023 19:07 )             27.2     04-28    137  |  102  |  11  ----------------------------<  106<H>  4.7   |  25  |  0.69    Ca    8.5      28 Apr 2023 19:07    TPro  7.3  /  Alb  3.6  /  TBili  <0.2  /  DBili  x   /  AST  16  /  ALT  8<L>  /  AlkPhos  68  04-28    PT/INR - ( 28 Apr 2023 19:07 )   PT: 14.4 sec;   INR: 1.21          PTT - ( 28 Apr 2023 19:07 )  PTT:22.1 sec    CAPILLARY BLOOD GLUCOSE        LIVER FUNCTIONS - ( 28 Apr 2023 19:07 )  Alb: 3.6 g/dL / Pro: 7.3 g/dL / ALK PHOS: 68 U/L / ALT: 8 U/L / AST: 16 U/L / GGT: x                 Assessment: 73y Female pmhx of HTN and two episodes DVT (1st episode 22 years ago, 2nd episode Feb/2023 ) presents to [facility] ED with LLE pain. CTA revealed severe atherosclerotic disease in left superficial femoral artery.     Recommendations:  - Plan pending discussion with attending  - discussed with Chief on call  - call x [phone] with questions Attending: Dr. Hughes    HPI: Pt is a 74yo F with pmhx of HTN and two episodes DVT (1st episode 22 years ago, 2nd episode Feb/2023 ) presents to [facility] ED with LLE pain. Pt states after bus trip to [place] (6 hrs) in february she got a LLE DVT, and first week of march she had procedure at [place] to remove the clot and was discharged on Xarelto. Pt states 2 weeks after discharge the LLE pain started again and has been worsening progressively to point where she cannot bear weight on LLE. Pt last saw vascular surgeon 2 weeks after discharged, and was not able to get follow up sonogram due to insurance. Pt states she was prescribed oxycodone pending pain management appointment, she was supposed to have pain management appointment today but it was rescheduled so presented to [facility] ED for pain management. Pt denies fever, chills, swelling, weakness, paresthesia to lower extremities, claudication, SOB, CP.    In ED, vital signs stable. Labs significant for Hgb 8.2, WBC 7.06, Cr 0.69. Duplex negative for LLE DVT. CTA revealed severe atherosclerotic disease in left superficial femoral artery.       PAST MEDICAL & SURGICAL HISTORY:  DVT, lower extremity          Allergies  ibuprofen (Unknown)  penicillin (Unknown)    Intolerances        Vital Signs Last 24 Hrs  T(C): 36.9 (28 Apr 2023 21:27), Max: 36.9 (28 Apr 2023 21:27)  T(F): 98.4 (28 Apr 2023 21:27), Max: 98.4 (28 Apr 2023 21:27)  HR: 75 (28 Apr 2023 23:20) (75 - 91)  BP: 121/70 (28 Apr 2023 23:20) (121/70 - 168/84)  BP(mean): --  RR: 20 (28 Apr 2023 23:20) (16 - 20)  SpO2: 99% (28 Apr 2023 23:20) (99% - 100%)    Parameters below as of 28 Apr 2023 23:20  Patient On (Oxygen Delivery Method): room air        I&O's Summary      Physical Exam:  General: NAD, resting comfortably  Pulmonary: normal resp effort  Cardiovascular: NSR  Abdominal: soft, NT/ND  Extremities: LLE: pt has pain to light touch of popliteal region. No erythema or edema. Motor and sensory grossly intact b/l.  Pulses: Left DP/PT triphasic    LABS:                        8.2    7.06  )-----------( 229      ( 28 Apr 2023 19:07 )             27.2     04-28    137  |  102  |  11  ----------------------------<  106<H>  4.7   |  25  |  0.69    Ca    8.5      28 Apr 2023 19:07    TPro  7.3  /  Alb  3.6  /  TBili  <0.2  /  DBili  x   /  AST  16  /  ALT  8<L>  /  AlkPhos  68  04-28    PT/INR - ( 28 Apr 2023 19:07 )   PT: 14.4 sec;   INR: 1.21          PTT - ( 28 Apr 2023 19:07 )  PTT:22.1 sec    CAPILLARY BLOOD GLUCOSE        LIVER FUNCTIONS - ( 28 Apr 2023 19:07 )  Alb: 3.6 g/dL / Pro: 7.3 g/dL / ALK PHOS: 68 U/L / ALT: 8 U/L / AST: 16 U/L / GGT: x                 Assessment: 73y Female pmhx of HTN and two episodes DVT (1st episode 22 years ago, 2nd episode Feb/2023 ) presents to [facility] ED with LLE pain. CTA revealed severe atherosclerotic disease in left superficial femoral artery. 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    Recommendations:  - No acute surgical intervention indicated at this time  - Recommend orthopedic surgery consult for evaluation of bone infarcts  - Can follow up with Dr. Hughes in the office for routine surveillance of L SFA stenosis.  - discussed with Chief on call  - call [phone] with questions

## 2023-04-29 NOTE — PATIENT PROFILE ADULT - FALL HARM RISK - HARM RISK INTERVENTIONS

## 2023-04-29 NOTE — H&P ADULT - ATTENDING COMMENTS
#LLE pain: p/w LLE pain mainly involving post knee and thigh. CT LE c/f b/l bone infarct of Unclear etiology. CTA c/f severe PAD/stenosis however Per vascular, no evidence of ischemia and unlikely cause of pain and bone infarcts. less likely 2/2 steroid use (given no significant use for past 20 yrs) vs underlying malignancy vs hypercoagulable disorder. Per ortho, no intervention. F/up MRI LLE. Heme consult in am. c/w xarelto, pain control. PT eval

## 2023-04-30 LAB
A1C WITH ESTIMATED AVERAGE GLUCOSE RESULT: 5.2 % — SIGNIFICANT CHANGE UP (ref 4–5.6)
ANION GAP SERPL CALC-SCNC: 8 MMOL/L — SIGNIFICANT CHANGE UP (ref 5–17)
BASOPHILS # BLD AUTO: 0.01 K/UL — SIGNIFICANT CHANGE UP (ref 0–0.2)
BASOPHILS NFR BLD AUTO: 0.2 % — SIGNIFICANT CHANGE UP (ref 0–2)
BLD GP AB SCN SERPL QL: NEGATIVE — SIGNIFICANT CHANGE UP
BUN SERPL-MCNC: 10 MG/DL — SIGNIFICANT CHANGE UP (ref 7–23)
CALCIUM SERPL-MCNC: 8.5 MG/DL — SIGNIFICANT CHANGE UP (ref 8.4–10.5)
CHLORIDE SERPL-SCNC: 103 MMOL/L — SIGNIFICANT CHANGE UP (ref 96–108)
CO2 SERPL-SCNC: 26 MMOL/L — SIGNIFICANT CHANGE UP (ref 22–31)
CREAT SERPL-MCNC: 0.7 MG/DL — SIGNIFICANT CHANGE UP (ref 0.5–1.3)
EGFR: 92 ML/MIN/1.73M2 — SIGNIFICANT CHANGE UP
EOSINOPHIL # BLD AUTO: 0.35 K/UL — SIGNIFICANT CHANGE UP (ref 0–0.5)
EOSINOPHIL NFR BLD AUTO: 6.5 % — HIGH (ref 0–6)
ESTIMATED AVERAGE GLUCOSE: 103 MG/DL — SIGNIFICANT CHANGE UP (ref 68–114)
FERRITIN SERPL-MCNC: 12 NG/ML — LOW (ref 15–150)
GLUCOSE BLDC GLUCOMTR-MCNC: 112 MG/DL — HIGH (ref 70–99)
GLUCOSE BLDC GLUCOMTR-MCNC: 137 MG/DL — HIGH (ref 70–99)
GLUCOSE BLDC GLUCOMTR-MCNC: 141 MG/DL — HIGH (ref 70–99)
GLUCOSE BLDC GLUCOMTR-MCNC: 141 MG/DL — HIGH (ref 70–99)
GLUCOSE BLDC GLUCOMTR-MCNC: 159 MG/DL — HIGH (ref 70–99)
GLUCOSE SERPL-MCNC: 109 MG/DL — HIGH (ref 70–99)
HCT VFR BLD CALC: 28.3 % — LOW (ref 34.5–45)
HCV AB S/CO SERPL IA: 12.68 S/CO — HIGH (ref 0–0.99)
HCV AB SERPL-IMP: REACTIVE
HGB BLD-MCNC: 8.5 G/DL — LOW (ref 11.5–15.5)
IMM GRANULOCYTES NFR BLD AUTO: 0.2 % — SIGNIFICANT CHANGE UP (ref 0–0.9)
IRON SATN MFR SERPL: 22 UG/DL — LOW (ref 30–160)
IRON SATN MFR SERPL: 7 % — LOW (ref 14–50)
LYMPHOCYTES # BLD AUTO: 1.61 K/UL — SIGNIFICANT CHANGE UP (ref 1–3.3)
LYMPHOCYTES # BLD AUTO: 29.7 % — SIGNIFICANT CHANGE UP (ref 13–44)
MAGNESIUM SERPL-MCNC: 1.4 MG/DL — LOW (ref 1.6–2.6)
MCHC RBC-ENTMCNC: 21.7 PG — LOW (ref 27–34)
MCHC RBC-ENTMCNC: 30 GM/DL — LOW (ref 32–36)
MCV RBC AUTO: 72.4 FL — LOW (ref 80–100)
MONOCYTES # BLD AUTO: 0.61 K/UL — SIGNIFICANT CHANGE UP (ref 0–0.9)
MONOCYTES NFR BLD AUTO: 11.3 % — SIGNIFICANT CHANGE UP (ref 2–14)
NEUTROPHILS # BLD AUTO: 2.83 K/UL — SIGNIFICANT CHANGE UP (ref 1.8–7.4)
NEUTROPHILS NFR BLD AUTO: 52.1 % — SIGNIFICANT CHANGE UP (ref 43–77)
NRBC # BLD: 0 /100 WBCS — SIGNIFICANT CHANGE UP (ref 0–0)
PHOSPHATE SERPL-MCNC: 3.9 MG/DL — SIGNIFICANT CHANGE UP (ref 2.5–4.5)
PLATELET # BLD AUTO: 200 K/UL — SIGNIFICANT CHANGE UP (ref 150–400)
POTASSIUM SERPL-MCNC: 3.8 MMOL/L — SIGNIFICANT CHANGE UP (ref 3.5–5.3)
POTASSIUM SERPL-SCNC: 3.8 MMOL/L — SIGNIFICANT CHANGE UP (ref 3.5–5.3)
RBC # BLD: 3.91 M/UL — SIGNIFICANT CHANGE UP (ref 3.8–5.2)
RBC # FLD: 17.1 % — HIGH (ref 10.3–14.5)
RH IG SCN BLD-IMP: POSITIVE — SIGNIFICANT CHANGE UP
SODIUM SERPL-SCNC: 137 MMOL/L — SIGNIFICANT CHANGE UP (ref 135–145)
TIBC SERPL-MCNC: 330 UG/DL — SIGNIFICANT CHANGE UP (ref 220–430)
UIBC SERPL-MCNC: 308 UG/DL — SIGNIFICANT CHANGE UP (ref 110–370)
WBC # BLD: 5.42 K/UL — SIGNIFICANT CHANGE UP (ref 3.8–10.5)
WBC # FLD AUTO: 5.42 K/UL — SIGNIFICANT CHANGE UP (ref 3.8–10.5)

## 2023-04-30 PROCEDURE — 73721 MRI JNT OF LWR EXTRE W/O DYE: CPT | Mod: 26,LT

## 2023-04-30 PROCEDURE — 99233 SBSQ HOSP IP/OBS HIGH 50: CPT

## 2023-04-30 RX ORDER — OXYCODONE HYDROCHLORIDE 5 MG/1
10 TABLET ORAL EVERY 6 HOURS
Refills: 0 | Status: DISCONTINUED | OUTPATIENT
Start: 2023-04-30 | End: 2023-04-30

## 2023-04-30 RX ORDER — ACETAMINOPHEN 500 MG
650 TABLET ORAL EVERY 6 HOURS
Refills: 0 | Status: DISCONTINUED | OUTPATIENT
Start: 2023-04-30 | End: 2023-04-30

## 2023-04-30 RX ORDER — MAGNESIUM SULFATE 500 MG/ML
4 VIAL (ML) INJECTION ONCE
Refills: 0 | Status: COMPLETED | OUTPATIENT
Start: 2023-04-30 | End: 2023-04-30

## 2023-04-30 RX ORDER — CYCLOBENZAPRINE HYDROCHLORIDE 10 MG/1
5 TABLET, FILM COATED ORAL THREE TIMES A DAY
Refills: 0 | Status: DISCONTINUED | OUTPATIENT
Start: 2023-04-30 | End: 2023-05-03

## 2023-04-30 RX ORDER — OXYCODONE HYDROCHLORIDE 5 MG/1
10 TABLET ORAL EVERY 6 HOURS
Refills: 0 | Status: DISCONTINUED | OUTPATIENT
Start: 2023-04-30 | End: 2023-05-03

## 2023-04-30 RX ORDER — MORPHINE SULFATE 50 MG/1
2 CAPSULE, EXTENDED RELEASE ORAL
Refills: 0 | Status: DISCONTINUED | OUTPATIENT
Start: 2023-04-30 | End: 2023-05-01

## 2023-04-30 RX ORDER — OXYCODONE HYDROCHLORIDE 5 MG/1
5 TABLET ORAL EVERY 6 HOURS
Refills: 0 | Status: DISCONTINUED | OUTPATIENT
Start: 2023-04-30 | End: 2023-05-03

## 2023-04-30 RX ORDER — ACETAMINOPHEN 500 MG
975 TABLET ORAL EVERY 8 HOURS
Refills: 0 | Status: DISCONTINUED | OUTPATIENT
Start: 2023-04-30 | End: 2023-05-03

## 2023-04-30 RX ADMIN — MORPHINE SULFATE 2 MILLIGRAM(S): 50 CAPSULE, EXTENDED RELEASE ORAL at 15:40

## 2023-04-30 RX ADMIN — OXYCODONE HYDROCHLORIDE 5 MILLIGRAM(S): 5 TABLET ORAL at 15:05

## 2023-04-30 RX ADMIN — SENNA PLUS 2 TABLET(S): 8.6 TABLET ORAL at 23:00

## 2023-04-30 RX ADMIN — Medication 650 MILLIGRAM(S): at 06:01

## 2023-04-30 RX ADMIN — MORPHINE SULFATE 2 MILLIGRAM(S): 50 CAPSULE, EXTENDED RELEASE ORAL at 12:30

## 2023-04-30 RX ADMIN — OXYCODONE HYDROCHLORIDE 10 MILLIGRAM(S): 5 TABLET ORAL at 10:05

## 2023-04-30 RX ADMIN — Medication 2: at 18:18

## 2023-04-30 RX ADMIN — Medication 975 MILLIGRAM(S): at 15:05

## 2023-04-30 RX ADMIN — MORPHINE SULFATE 2 MILLIGRAM(S): 50 CAPSULE, EXTENDED RELEASE ORAL at 15:55

## 2023-04-30 RX ADMIN — OXYCODONE HYDROCHLORIDE 5 MILLIGRAM(S): 5 TABLET ORAL at 23:38

## 2023-04-30 RX ADMIN — POLYETHYLENE GLYCOL 3350 17 GRAM(S): 17 POWDER, FOR SOLUTION ORAL at 18:19

## 2023-04-30 RX ADMIN — LISINOPRIL 40 MILLIGRAM(S): 2.5 TABLET ORAL at 22:59

## 2023-04-30 RX ADMIN — TIOTROPIUM BROMIDE 2 PUFF(S): 18 CAPSULE ORAL; RESPIRATORY (INHALATION) at 12:31

## 2023-04-30 RX ADMIN — ATORVASTATIN CALCIUM 40 MILLIGRAM(S): 80 TABLET, FILM COATED ORAL at 22:59

## 2023-04-30 RX ADMIN — Medication 975 MILLIGRAM(S): at 23:00

## 2023-04-30 RX ADMIN — OXYCODONE HYDROCHLORIDE 10 MILLIGRAM(S): 5 TABLET ORAL at 19:17

## 2023-04-30 RX ADMIN — OXYCODONE HYDROCHLORIDE 5 MILLIGRAM(S): 5 TABLET ORAL at 13:30

## 2023-04-30 RX ADMIN — BUDESONIDE AND FORMOTEROL FUMARATE DIHYDRATE 2 PUFF(S): 160; 4.5 AEROSOL RESPIRATORY (INHALATION) at 06:02

## 2023-04-30 RX ADMIN — BUDESONIDE AND FORMOTEROL FUMARATE DIHYDRATE 2 PUFF(S): 160; 4.5 AEROSOL RESPIRATORY (INHALATION) at 18:18

## 2023-04-30 RX ADMIN — AMLODIPINE BESYLATE 5 MILLIGRAM(S): 2.5 TABLET ORAL at 06:02

## 2023-04-30 RX ADMIN — OXYCODONE HYDROCHLORIDE 10 MILLIGRAM(S): 5 TABLET ORAL at 11:05

## 2023-04-30 RX ADMIN — Medication 650 MILLIGRAM(S): at 07:06

## 2023-04-30 RX ADMIN — POLYETHYLENE GLYCOL 3350 17 GRAM(S): 17 POWDER, FOR SOLUTION ORAL at 06:01

## 2023-04-30 RX ADMIN — RIVAROXABAN 20 MILLIGRAM(S): KIT at 12:31

## 2023-04-30 RX ADMIN — CYCLOBENZAPRINE HYDROCHLORIDE 5 MILLIGRAM(S): 10 TABLET, FILM COATED ORAL at 06:02

## 2023-04-30 RX ADMIN — Medication 975 MILLIGRAM(S): at 13:30

## 2023-04-30 RX ADMIN — MORPHINE SULFATE 2 MILLIGRAM(S): 50 CAPSULE, EXTENDED RELEASE ORAL at 12:45

## 2023-04-30 NOTE — PROGRESS NOTE ADULT - PROBLEM SELECTOR PLAN 3
CTA showing severe atherosclerotic disease in L superficial femoral artery. Vascular consulted in ED, no acute surgical intervention. Can follow up w/ Dr. Hughes as outpatient.  - continue home med xarelto 20mg qd  - started on atorvastatin 40mg daily  - ask vascular if should start aspirin CTA showing severe atherosclerotic disease in L superficial femoral artery. Vascular consulted in ED, no acute surgical intervention. Can follow up w/ Dr. Hughes as outpatient.  - c/w home med xarelto 20mg qd  - started on atorvastatin 40mg daily  - ask vascular if should start aspirin

## 2023-04-30 NOTE — PROGRESS NOTE ADULT - PROBLEM SELECTOR PLAN 1
CT LE showing bone infarct in b/l femurs and tibias. CTA LLE showing severe atherosclerotic disease in L superficial femoral artery. Pain 2/2 bone infarcts vs atherosclerotic disease. Ortho and vascular consulted in ED.   Per ED note, ortho feels bone infarcts caused by long term steroid use (lots of steroid use in asthma hx) vs malignancy. Vascular feels the superficial stenosis is unlikely to be cause of pain.   Consider malignancy vs hypercoaguability  - pain management: tylenol 650mg PO for mild pain, oxycodone 5mg/325mg q6 PRN for moderate pain, oxycodone 10mg q6 PRN for severe pain  - f/u ortho recs  - PT consult  - collateral on cancer screenings, per patient colonoscopy was many years back. mammogram?  - hem onc consult in am for hypercoaguable w/u CT LE showing bone infarct in b/l femurs and tibias. CTA LLE showing severe atherosclerotic disease in L superficial femoral artery. Pain 2/2 bone infarcts vs atherosclerotic disease. Ortho and vascular consulted in ED.   Per ED note, ortho feels bone infarcts caused by long term steroid use (lots of steroid use in asthma hx) vs malignancy. Vascular feels the superficial stenosis is unlikely to be cause of pain.   Consider malignancy vs hypercoaguability  - pain management: tylenol 975 mg PO standing, oxycodone 5mg q6 PRN for moderate pain, oxycodone 10mg q6 PRN for severe pain, morphine 2 mg IV q3h PRN for breakthrough pain   - f/u ortho recs  - PT recommended home PT   - collateral on cancer screenings, per patient colonoscopy was many years back. mammogram?  - f/u hypercoaguability workup: Lupus panel, factor V leiden, prothrombin gene mutation, protein C, protein S, activated protein C resistance assay, antithrombin III assay

## 2023-04-30 NOTE — PROGRESS NOTE ADULT - PROBLEM SELECTOR PLAN 7
Home med: metformin 1000mg daily. Patient reports that she does not have diabetes, she was just put on metformin because she has been on steroids so much in the past that her sugar used to be elevated.  - mISS  - f/u A1C Home med: metformin 1000mg daily. Patient reports that she does not have diabetes, she was just put on metformin because she has been on steroids so much in the past that her sugar used to be elevated.  - mISS  - A1c 5.2%

## 2023-04-30 NOTE — PROGRESS NOTE ADULT - SUBJECTIVE AND OBJECTIVE BOX
incomplete note O/N Events:  Subjective/ROS: Denies HA, CP, SOB, n/v, changes in bowel/urinary habits.  12pt ROS otherwise negative.    VITALS  Vital Signs Last 24 Hrs  T(C): 36.6 (30 Apr 2023 09:05), Max: 37.2 (29 Apr 2023 12:51)  T(F): 97.9 (30 Apr 2023 09:05), Max: 99 (29 Apr 2023 12:51)  HR: 68 (30 Apr 2023 09:05) (68 - 85)  BP: 123/68 (30 Apr 2023 09:05) (112/77 - 149/78)  BP(mean): --  RR: 18 (30 Apr 2023 09:05) (16 - 18)  SpO2: 100% (30 Apr 2023 09:05) (94% - 100%)    Parameters below as of 30 Apr 2023 09:05  Patient On (Oxygen Delivery Method): room air        I&O's Summary      CAPILLARY BLOOD GLUCOSE      POCT Blood Glucose.: 112 mg/dL (30 Apr 2023 09:09)  POCT Blood Glucose.: 110 mg/dL (29 Apr 2023 21:55)      PHYSICAL EXAM  Constitutional: laying in bed, in pain  Head: NC/AT  Eyes: PERRL, EOMI, anicteric sclera  ENT: no nasal discharge; uvula midline, no oropharyngeal erythema or exudates; MMM  Neck: supple; no JVD or thyromegaly  Respiratory: CTA B/L; no W/R/R, no retractions  Cardiac: +S1/S2; RRR; no M/R/G; PMI non-displaced  Gastrointestinal: abdomen soft, NT/ND; no rebound or guarding  Extremities: WWP, no clubbing or cyanosis; no peripheral edema  Musculoskeletal: ROM L knee limited 2/2 pain; no joint swelling, tenderness or erythema  Vascular: 2+ radial, DP pulses B/L  Neurologic: AAOx3; CNII-XII grossly intact; no focal deficits    MEDICATIONS  (STANDING):  acetaminophen     Tablet .. 975 milliGRAM(s) Oral every 8 hours  amLODIPine   Tablet 5 milliGRAM(s) Oral every 24 hours  atorvastatin 40 milliGRAM(s) Oral at bedtime  budesonide  80 MICROgram(s)/formoterol 4.5 MICROgram(s) Inhaler 2 Puff(s) Inhalation two times a day  dextrose 5%. 1000 milliLiter(s) (100 mL/Hr) IV Continuous <Continuous>  dextrose 5%. 1000 milliLiter(s) (50 mL/Hr) IV Continuous <Continuous>  dextrose 50% Injectable 12.5 Gram(s) IV Push once  dextrose 50% Injectable 25 Gram(s) IV Push once  dextrose 50% Injectable 25 Gram(s) IV Push once  glucagon  Injectable 1 milliGRAM(s) IntraMuscular once  insulin lispro (ADMELOG) corrective regimen sliding scale   SubCutaneous Before meals and at bedtime  lisinopril 40 milliGRAM(s) Oral every 24 hours  magnesium sulfate  IVPB 4 Gram(s) IV Intermittent once  polyethylene glycol 3350 17 Gram(s) Oral two times a day  rivaroxaban 20 milliGRAM(s) Oral daily  senna 2 Tablet(s) Oral at bedtime  tiotropium 2.5 MICROgram(s) Inhaler 2 Puff(s) Inhalation daily    MEDICATIONS  (PRN):  albuterol    90 MICROgram(s) HFA Inhaler 2 Puff(s) Inhalation every 6 hours PRN Shortness of Breath and/or Wheezing  cyclobenzaprine 5 milliGRAM(s) Oral three times a day PRN Muscle Spasm  dextrose Oral Gel 15 Gram(s) Oral once PRN Blood Glucose LESS THAN 70 milliGRAM(s)/deciliter  morphine  - Injectable 2 milliGRAM(s) IV Push every 3 hours PRN Severe Pain (7 - 10)  oxyCODONE    IR 10 milliGRAM(s) Oral every 6 hours PRN Severe Pain (7 - 10)  oxyCODONE    IR 5 milliGRAM(s) Oral every 6 hours PRN Moderate Pain (4 - 6)      ibuprofen (Unknown)  penicillin (Unknown)      LABS                        8.5    5.42  )-----------( 200      ( 30 Apr 2023 05:30 )             28.3     04-30    137  |  103  |  10  ----------------------------<  109<H>  3.8   |  26  |  0.70    Ca    8.5      30 Apr 2023 05:30  Phos  3.9     04-30  Mg     1.4     04-30    TPro  7.3  /  Alb  3.6  /  TBili  <0.2  /  DBili  x   /  AST  16  /  ALT  8<L>  /  AlkPhos  68  04-28    PT/INR - ( 28 Apr 2023 19:07 )   PT: 14.4 sec;   INR: 1.21          PTT - ( 28 Apr 2023 19:07 )  PTT:22.1 sec          IMAGING/EKG/ETC: reviewed O/N Events:  Subjective/ROS: Patient seen and examined at bedside. No acute events overnight. This morning, pt continues to report severe pain in L knee, moderately well controlled with existing pain regimen. Denies fever, chills, HA, dizziness, CP, SOB, palpitations, abdominal pain, n/v, changes in bowel/urinary habits, numbness, or tingling.     VITALS  Vital Signs Last 24 Hrs  T(C): 36.6 (30 Apr 2023 09:05), Max: 37.2 (29 Apr 2023 12:51)  T(F): 97.9 (30 Apr 2023 09:05), Max: 99 (29 Apr 2023 12:51)  HR: 68 (30 Apr 2023 09:05) (68 - 85)  BP: 123/68 (30 Apr 2023 09:05) (112/77 - 149/78)  BP(mean): --  RR: 18 (30 Apr 2023 09:05) (16 - 18)  SpO2: 100% (30 Apr 2023 09:05) (94% - 100%)    Parameters below as of 30 Apr 2023 09:05  Patient On (Oxygen Delivery Method): room air        I&O's Summary      CAPILLARY BLOOD GLUCOSE      POCT Blood Glucose.: 112 mg/dL (30 Apr 2023 09:09)  POCT Blood Glucose.: 110 mg/dL (29 Apr 2023 21:55)      PHYSICAL EXAM  Constitutional: laying in bed, in pain  Head: NC/AT  Eyes: PERRL, EOMI, anicteric sclera  ENT: no nasal discharge; uvula midline, no oropharyngeal erythema or exudates; MMM  Neck: supple; no JVD or thyromegaly  Respiratory: CTA B/L; no W/R/R, no retractions  Cardiac: +S1/S2; RRR; no M/R/G; PMI non-displaced  Gastrointestinal: abdomen soft, NT/ND; no rebound or guarding  Extremities: WWP, no clubbing or cyanosis; no peripheral edema  Musculoskeletal: ROM L knee limited 2/2 pain; no joint swelling, tenderness or erythema  Vascular: 2+ radial, DP pulses B/L  Neurologic: AAOx3; CNII-XII grossly intact; no focal deficits    MEDICATIONS  (STANDING):  acetaminophen     Tablet .. 975 milliGRAM(s) Oral every 8 hours  amLODIPine   Tablet 5 milliGRAM(s) Oral every 24 hours  atorvastatin 40 milliGRAM(s) Oral at bedtime  budesonide  80 MICROgram(s)/formoterol 4.5 MICROgram(s) Inhaler 2 Puff(s) Inhalation two times a day  dextrose 5%. 1000 milliLiter(s) (100 mL/Hr) IV Continuous <Continuous>  dextrose 5%. 1000 milliLiter(s) (50 mL/Hr) IV Continuous <Continuous>  dextrose 50% Injectable 12.5 Gram(s) IV Push once  dextrose 50% Injectable 25 Gram(s) IV Push once  dextrose 50% Injectable 25 Gram(s) IV Push once  glucagon  Injectable 1 milliGRAM(s) IntraMuscular once  insulin lispro (ADMELOG) corrective regimen sliding scale   SubCutaneous Before meals and at bedtime  lisinopril 40 milliGRAM(s) Oral every 24 hours  magnesium sulfate  IVPB 4 Gram(s) IV Intermittent once  polyethylene glycol 3350 17 Gram(s) Oral two times a day  rivaroxaban 20 milliGRAM(s) Oral daily  senna 2 Tablet(s) Oral at bedtime  tiotropium 2.5 MICROgram(s) Inhaler 2 Puff(s) Inhalation daily    MEDICATIONS  (PRN):  albuterol    90 MICROgram(s) HFA Inhaler 2 Puff(s) Inhalation every 6 hours PRN Shortness of Breath and/or Wheezing  cyclobenzaprine 5 milliGRAM(s) Oral three times a day PRN Muscle Spasm  dextrose Oral Gel 15 Gram(s) Oral once PRN Blood Glucose LESS THAN 70 milliGRAM(s)/deciliter  morphine  - Injectable 2 milliGRAM(s) IV Push every 3 hours PRN Severe Pain (7 - 10)  oxyCODONE    IR 10 milliGRAM(s) Oral every 6 hours PRN Severe Pain (7 - 10)  oxyCODONE    IR 5 milliGRAM(s) Oral every 6 hours PRN Moderate Pain (4 - 6)      ibuprofen (Unknown)  penicillin (Unknown)      LABS                        8.5    5.42  )-----------( 200      ( 30 Apr 2023 05:30 )             28.3     04-30    137  |  103  |  10  ----------------------------<  109<H>  3.8   |  26  |  0.70    Ca    8.5      30 Apr 2023 05:30  Phos  3.9     04-30  Mg     1.4     04-30    TPro  7.3  /  Alb  3.6  /  TBili  <0.2  /  DBili  x   /  AST  16  /  ALT  8<L>  /  AlkPhos  68  04-28    PT/INR - ( 28 Apr 2023 19:07 )   PT: 14.4 sec;   INR: 1.21          PTT - ( 28 Apr 2023 19:07 )  PTT:22.1 sec          IMAGING/EKG/ETC: reviewed

## 2023-04-30 NOTE — PROGRESS NOTE ADULT - PROBLEM SELECTOR PLAN 8
F: PO intake  E: replete as needed  N: kosher diet   DVT: xarelto 20mg daily F: PO  E: replete as needed  N: kosher diet   GI ppx: None  DVT ppx: xarelto 20mg daily  Code status: Full code  Dispo: [facility] F: PO  E: replete as needed  N: Regular diet  GI ppx: None  DVT ppx: xarelto 20mg daily  Code status: Full code  Dispo: [facility]

## 2023-04-30 NOTE — PHYSICAL THERAPY INITIAL EVALUATION ADULT - PERTINENT HX OF CURRENT PROBLEM, REHAB EVAL
73F who is presenting to the ED w/ L knee pain worsening over the past 2 months. In February patient was on a 6 hour bus ride, next day had severe L knee pain. Thought it could be related to blood clot, so went to doctor who did a clot removal procedure and removed her IVC filter, but this did not help the pain. Pain was controlled for a week because she was on pain medication, but then worsened once the medication stopped.

## 2023-04-30 NOTE — PROGRESS NOTE ADULT - PROBLEM SELECTOR PLAN 4
Hgb 8.2 on admission w/ low MCV. Unknown baseline. Per patient she has heard of having a low blood count in the past but does not know why.  - f/u iron panel Hgb 8.2 on admission w/ low MCV. Unknown baseline. Per patient she has heard of having a low blood count in the past but does not know why.  - Iron panel suggests STARR

## 2023-04-30 NOTE — PROGRESS NOTE ADULT - PROBLEM SELECTOR PLAN 2
Hx of DVT 20 years ago, trialed coumadin but had IVC filter placed instead. IVC filter and clot removed in 2/2023. Unknown if DVT was provoked or not.  - continue xarelto 20mg daily  - hem onc consult in am for hypercoaguable w/u Hx of DVT 20 years ago, trialed coumadin but had IVC filter placed instead. IVC filter and clot removed in 2/2023. Unknown if DVT was provoked or not.  - c/w xarelto 20mg daily  - f/u hypercoagulability workup

## 2023-05-01 LAB
ALBUMIN SERPL ELPH-MCNC: 3.6 G/DL — SIGNIFICANT CHANGE UP (ref 3.3–5)
ALP SERPL-CCNC: 79 U/L — SIGNIFICANT CHANGE UP (ref 40–120)
ALT FLD-CCNC: 10 U/L — SIGNIFICANT CHANGE UP (ref 10–45)
ANION GAP SERPL CALC-SCNC: 12 MMOL/L — SIGNIFICANT CHANGE UP (ref 5–17)
AST SERPL-CCNC: 15 U/L — SIGNIFICANT CHANGE UP (ref 10–40)
BASOPHILS # BLD AUTO: 0.02 K/UL — SIGNIFICANT CHANGE UP (ref 0–0.2)
BASOPHILS NFR BLD AUTO: 0.3 % — SIGNIFICANT CHANGE UP (ref 0–2)
BILIRUB SERPL-MCNC: 0.2 MG/DL — SIGNIFICANT CHANGE UP (ref 0.2–1.2)
BUN SERPL-MCNC: 10 MG/DL — SIGNIFICANT CHANGE UP (ref 7–23)
CALCIUM SERPL-MCNC: 8.8 MG/DL — SIGNIFICANT CHANGE UP (ref 8.4–10.5)
CHLORIDE SERPL-SCNC: 103 MMOL/L — SIGNIFICANT CHANGE UP (ref 96–108)
CO2 SERPL-SCNC: 25 MMOL/L — SIGNIFICANT CHANGE UP (ref 22–31)
CONFIRM APTT STACLOT: NEGATIVE — SIGNIFICANT CHANGE UP
CREAT SERPL-MCNC: 0.72 MG/DL — SIGNIFICANT CHANGE UP (ref 0.5–1.3)
DRVVT RATIO: 0.95 RATIO — SIGNIFICANT CHANGE UP (ref 0–1.03)
DRVVT SCREEN TO CONFIRM RATIO: SIGNIFICANT CHANGE UP
EGFR: 89 ML/MIN/1.73M2 — SIGNIFICANT CHANGE UP
EOSINOPHIL # BLD AUTO: 0.42 K/UL — SIGNIFICANT CHANGE UP (ref 0–0.5)
EOSINOPHIL NFR BLD AUTO: 6.8 % — HIGH (ref 0–6)
GLUCOSE BLDC GLUCOMTR-MCNC: 120 MG/DL — HIGH (ref 70–99)
GLUCOSE BLDC GLUCOMTR-MCNC: 137 MG/DL — HIGH (ref 70–99)
GLUCOSE BLDC GLUCOMTR-MCNC: 140 MG/DL — HIGH (ref 70–99)
GLUCOSE BLDC GLUCOMTR-MCNC: 144 MG/DL — HIGH (ref 70–99)
GLUCOSE SERPL-MCNC: 106 MG/DL — HIGH (ref 70–99)
HCT VFR BLD CALC: 28.5 % — LOW (ref 34.5–45)
HCV RNA FLD QL NAA+PROBE: SIGNIFICANT CHANGE UP
HCV RNA SPEC QL PROBE+SIG AMP: SIGNIFICANT CHANGE UP
HGB BLD-MCNC: 8.5 G/DL — LOW (ref 11.5–15.5)
IMM GRANULOCYTES NFR BLD AUTO: 0.2 % — SIGNIFICANT CHANGE UP (ref 0–0.9)
LYMPHOCYTES # BLD AUTO: 2.17 K/UL — SIGNIFICANT CHANGE UP (ref 1–3.3)
LYMPHOCYTES # BLD AUTO: 35.3 % — SIGNIFICANT CHANGE UP (ref 13–44)
MAGNESIUM SERPL-MCNC: 1.7 MG/DL — SIGNIFICANT CHANGE UP (ref 1.6–2.6)
MCHC RBC-ENTMCNC: 22 PG — LOW (ref 27–34)
MCHC RBC-ENTMCNC: 29.8 GM/DL — LOW (ref 32–36)
MCV RBC AUTO: 73.8 FL — LOW (ref 80–100)
MONOCYTES # BLD AUTO: 0.67 K/UL — SIGNIFICANT CHANGE UP (ref 0–0.9)
MONOCYTES NFR BLD AUTO: 10.9 % — SIGNIFICANT CHANGE UP (ref 2–14)
NEUTROPHILS # BLD AUTO: 2.85 K/UL — SIGNIFICANT CHANGE UP (ref 1.8–7.4)
NEUTROPHILS NFR BLD AUTO: 46.5 % — SIGNIFICANT CHANGE UP (ref 43–77)
NRBC # BLD: 0 /100 WBCS — SIGNIFICANT CHANGE UP (ref 0–0)
PHOSPHATE SERPL-MCNC: 3.6 MG/DL — SIGNIFICANT CHANGE UP (ref 2.5–4.5)
PLATELET # BLD AUTO: 241 K/UL — SIGNIFICANT CHANGE UP (ref 150–400)
POTASSIUM SERPL-MCNC: 4.3 MMOL/L — SIGNIFICANT CHANGE UP (ref 3.5–5.3)
POTASSIUM SERPL-SCNC: 4.3 MMOL/L — SIGNIFICANT CHANGE UP (ref 3.5–5.3)
PROT SERPL-MCNC: 7.6 G/DL — SIGNIFICANT CHANGE UP (ref 6–8.3)
RBC # BLD: 3.86 M/UL — SIGNIFICANT CHANGE UP (ref 3.8–5.2)
RBC # FLD: 17 % — HIGH (ref 10.3–14.5)
SODIUM SERPL-SCNC: 140 MMOL/L — SIGNIFICANT CHANGE UP (ref 135–145)
WBC # BLD: 6.14 K/UL — SIGNIFICANT CHANGE UP (ref 3.8–10.5)
WBC # FLD AUTO: 6.14 K/UL — SIGNIFICANT CHANGE UP (ref 3.8–10.5)

## 2023-05-01 PROCEDURE — 36000 PLACE NEEDLE IN VEIN: CPT

## 2023-05-01 PROCEDURE — 76937 US GUIDE VASCULAR ACCESS: CPT | Mod: 26

## 2023-05-01 PROCEDURE — 99232 SBSQ HOSP IP/OBS MODERATE 35: CPT

## 2023-05-01 RX ADMIN — BUDESONIDE AND FORMOTEROL FUMARATE DIHYDRATE 2 PUFF(S): 160; 4.5 AEROSOL RESPIRATORY (INHALATION) at 08:13

## 2023-05-01 RX ADMIN — OXYCODONE HYDROCHLORIDE 10 MILLIGRAM(S): 5 TABLET ORAL at 02:04

## 2023-05-01 RX ADMIN — AMLODIPINE BESYLATE 5 MILLIGRAM(S): 2.5 TABLET ORAL at 08:13

## 2023-05-01 RX ADMIN — OXYCODONE HYDROCHLORIDE 5 MILLIGRAM(S): 5 TABLET ORAL at 21:40

## 2023-05-01 RX ADMIN — RIVAROXABAN 20 MILLIGRAM(S): KIT at 11:46

## 2023-05-01 RX ADMIN — OXYCODONE HYDROCHLORIDE 10 MILLIGRAM(S): 5 TABLET ORAL at 17:56

## 2023-05-01 RX ADMIN — Medication 975 MILLIGRAM(S): at 21:55

## 2023-05-01 RX ADMIN — OXYCODONE HYDROCHLORIDE 10 MILLIGRAM(S): 5 TABLET ORAL at 23:59

## 2023-05-01 RX ADMIN — ATORVASTATIN CALCIUM 40 MILLIGRAM(S): 80 TABLET, FILM COATED ORAL at 21:13

## 2023-05-01 RX ADMIN — OXYCODONE HYDROCHLORIDE 5 MILLIGRAM(S): 5 TABLET ORAL at 00:36

## 2023-05-01 RX ADMIN — Medication 975 MILLIGRAM(S): at 00:00

## 2023-05-01 RX ADMIN — OXYCODONE HYDROCHLORIDE 10 MILLIGRAM(S): 5 TABLET ORAL at 01:04

## 2023-05-01 RX ADMIN — LISINOPRIL 40 MILLIGRAM(S): 2.5 TABLET ORAL at 21:13

## 2023-05-01 RX ADMIN — POLYETHYLENE GLYCOL 3350 17 GRAM(S): 17 POWDER, FOR SOLUTION ORAL at 08:13

## 2023-05-01 RX ADMIN — BUDESONIDE AND FORMOTEROL FUMARATE DIHYDRATE 2 PUFF(S): 160; 4.5 AEROSOL RESPIRATORY (INHALATION) at 18:21

## 2023-05-01 RX ADMIN — Medication 975 MILLIGRAM(S): at 13:28

## 2023-05-01 RX ADMIN — POLYETHYLENE GLYCOL 3350 17 GRAM(S): 17 POWDER, FOR SOLUTION ORAL at 18:21

## 2023-05-01 RX ADMIN — Medication 975 MILLIGRAM(S): at 21:13

## 2023-05-01 RX ADMIN — CYCLOBENZAPRINE HYDROCHLORIDE 5 MILLIGRAM(S): 10 TABLET, FILM COATED ORAL at 01:04

## 2023-05-01 RX ADMIN — CYCLOBENZAPRINE HYDROCHLORIDE 5 MILLIGRAM(S): 10 TABLET, FILM COATED ORAL at 11:46

## 2023-05-01 RX ADMIN — TIOTROPIUM BROMIDE 2 PUFF(S): 18 CAPSULE ORAL; RESPIRATORY (INHALATION) at 11:47

## 2023-05-01 RX ADMIN — OXYCODONE HYDROCHLORIDE 10 MILLIGRAM(S): 5 TABLET ORAL at 11:46

## 2023-05-01 RX ADMIN — CYCLOBENZAPRINE HYDROCHLORIDE 5 MILLIGRAM(S): 10 TABLET, FILM COATED ORAL at 17:56

## 2023-05-01 RX ADMIN — OXYCODONE HYDROCHLORIDE 5 MILLIGRAM(S): 5 TABLET ORAL at 20:56

## 2023-05-01 RX ADMIN — Medication 975 MILLIGRAM(S): at 08:13

## 2023-05-01 RX ADMIN — OXYCODONE HYDROCHLORIDE 10 MILLIGRAM(S): 5 TABLET ORAL at 12:43

## 2023-05-01 RX ADMIN — Medication 975 MILLIGRAM(S): at 06:58

## 2023-05-01 RX ADMIN — Medication 975 MILLIGRAM(S): at 14:20

## 2023-05-01 NOTE — PROGRESS NOTE ADULT - PROBLEM SELECTOR PLAN 8
F: PO  E: replete as needed  N: Regular diet  GI ppx: None  DVT ppx: xarelto 20mg daily  Code status: Full code  Dispo: [facility]

## 2023-05-01 NOTE — PROGRESS NOTE ADULT - PROBLEM SELECTOR PLAN 7
Home med: metformin 1000mg daily. Patient reports that she does not have diabetes, she was just put on metformin because she has been on steroids so much in the past that her sugar used to be elevated.  - mISS  - A1c 5.2%

## 2023-05-01 NOTE — PROGRESS NOTE ADULT - SUBJECTIVE AND OBJECTIVE BOX
Orthopaedic Surgery Progress Note     MRI LLE findings discussed with Dr. Barton.   No acute orthopaedic intervention for findings of subacute on chronic osteonecrosis of distal femur and proximal tibia.  Pain control as needed.  WBAT, PT

## 2023-05-01 NOTE — PROGRESS NOTE ADULT - PROBLEM SELECTOR PLAN 2
Hx of DVT 20 years ago, trialed coumadin but had IVC filter placed instead. IVC filter and clot removed in 2/2023. Unknown if DVT was provoked or not.  - c/w xarelto 20mg daily  - f/u hypercoagulability workup

## 2023-05-01 NOTE — PROGRESS NOTE ADULT - SUBJECTIVE AND OBJECTIVE BOX
INTERVAL HPI/OVERNIGHT EVENTS:  Patient was seen and examined at bedside. Overnight, multiple attempts for IV access were unsuccessful. This morning, patient complaining of L knee pain. States that the IV medication helps the most. ROS otherwise negative.    VITAL SIGNS:  T(F): 97.6 (05-01-23 @ 05:25)  HR: 76 (05-01-23 @ 05:25)  BP: 104/76 (05-01-23 @ 05:25)  RR: 18 (05-01-23 @ 05:25)  SpO2: 100% (05-01-23 @ 05:25)  Wt(kg): --        PHYSICAL EXAM:    Constitutional: resting comfortably in bed; NAD  HEENT: NC/AT, PER, anicteric sclera, no nasal discharge; MMM  Neck: supple; no JVD or thyromegaly  Respiratory: CTA B/L; no W/R/R, no retractions  Cardiac: +S1/S2; RRR; no M/R/G  Gastrointestinal: soft, NT/ND; no rebound or guarding  Extremities: WWP, no clubbing or cyanosis; no peripheral edema  Musculoskeletal: ROM L knee limited 2/2 pain  Vascular: 2+ radial, DP/PT pulses B/L  Neurologic: AAOx3; CNII-XII grossly intact; no focal deficits    MEDICATIONS  (STANDING):  acetaminophen     Tablet .. 975 milliGRAM(s) Oral every 8 hours  amLODIPine   Tablet 5 milliGRAM(s) Oral every 24 hours  atorvastatin 40 milliGRAM(s) Oral at bedtime  budesonide  80 MICROgram(s)/formoterol 4.5 MICROgram(s) Inhaler 2 Puff(s) Inhalation two times a day  dextrose 5%. 1000 milliLiter(s) (100 mL/Hr) IV Continuous <Continuous>  dextrose 5%. 1000 milliLiter(s) (50 mL/Hr) IV Continuous <Continuous>  dextrose 50% Injectable 25 Gram(s) IV Push once  dextrose 50% Injectable 12.5 Gram(s) IV Push once  dextrose 50% Injectable 25 Gram(s) IV Push once  glucagon  Injectable 1 milliGRAM(s) IntraMuscular once  insulin lispro (ADMELOG) corrective regimen sliding scale   SubCutaneous Before meals and at bedtime  lisinopril 40 milliGRAM(s) Oral every 24 hours  polyethylene glycol 3350 17 Gram(s) Oral two times a day  rivaroxaban 20 milliGRAM(s) Oral daily  senna 2 Tablet(s) Oral at bedtime  tiotropium 2.5 MICROgram(s) Inhaler 2 Puff(s) Inhalation daily    MEDICATIONS  (PRN):  albuterol    90 MICROgram(s) HFA Inhaler 2 Puff(s) Inhalation every 6 hours PRN Shortness of Breath and/or Wheezing  cyclobenzaprine 5 milliGRAM(s) Oral three times a day PRN Muscle Spasm  dextrose Oral Gel 15 Gram(s) Oral once PRN Blood Glucose LESS THAN 70 milliGRAM(s)/deciliter  morphine  - Injectable 2 milliGRAM(s) IV Push every 3 hours PRN Severe Pain (7 - 10)  oxyCODONE    IR 5 milliGRAM(s) Oral every 6 hours PRN Moderate Pain (4 - 6)  oxyCODONE    IR 10 milliGRAM(s) Oral every 6 hours PRN Severe Pain (7 - 10)      Allergies    ibuprofen (Unknown)  penicillin (Unknown)    Intolerances        LABS:                        8.5    6.14  )-----------( 241      ( 01 May 2023 07:54 )             28.5     05-01    140  |  103  |  10  ----------------------------<  106<H>  4.3   |  25  |  0.72    Ca    8.8      01 May 2023 07:54  Phos  3.6     05-01  Mg     1.7     05-01    TPro  7.6  /  Alb  3.6  /  TBili  0.2  /  DBili  x   /  AST  15  /  ALT  10  /  AlkPhos  79  05-01          RADIOLOGY & ADDITIONAL TESTS:  Reviewed

## 2023-05-01 NOTE — PROGRESS NOTE ADULT - PROBLEM SELECTOR PLAN 1
CT LE showing bone infarct in b/l femurs and tibias. CTA LLE showing severe atherosclerotic disease in L superficial femoral artery. Pain 2/2 bone infarcts vs atherosclerotic disease. Ortho and vascular consulted in ED.   Per ED note, ortho feels bone infarcts caused by long term steroid use (lots of steroid use in asthma hx) vs malignancy. Vascular feels the superficial stenosis is unlikely to be cause of pain.   Consider malignancy vs hypercoaguability  - pain management: tylenol 975 mg PO standing, oxycodone 5mg q6 PRN for moderate pain, oxycodone 10mg q6 PRN for severe pain, morphine 2 mg IV q3h PRN for breakthrough pain   - f/u ortho recs  - PT recommended home PT   - collateral on cancer screenings, per patient colonoscopy was many years back. mammogram?  - f/u hypercoaguability workup: Lupus panel, factor V leiden, prothrombin gene mutation, protein C, protein S, activated protein C resistance assay, antithrombin III assay  - f/u TTE

## 2023-05-02 ENCOUNTER — TRANSCRIPTION ENCOUNTER (OUTPATIENT)
Age: 73
End: 2023-05-02

## 2023-05-02 LAB
ALBUMIN SERPL ELPH-MCNC: 3.2 G/DL — LOW (ref 3.3–5)
ALP SERPL-CCNC: 76 U/L — SIGNIFICANT CHANGE UP (ref 40–120)
ALT FLD-CCNC: 9 U/L — LOW (ref 10–45)
ANION GAP SERPL CALC-SCNC: 8 MMOL/L — SIGNIFICANT CHANGE UP (ref 5–17)
ANISOCYTOSIS BLD QL: SLIGHT — SIGNIFICANT CHANGE UP
APCR PPP: 2.81 RATIO — SIGNIFICANT CHANGE UP
AST SERPL-CCNC: 16 U/L — SIGNIFICANT CHANGE UP (ref 10–40)
AT III ACT/NOR PPP CHRO: 97 % — SIGNIFICANT CHANGE UP (ref 85–135)
BASOPHILS # BLD AUTO: 0 K/UL — SIGNIFICANT CHANGE UP (ref 0–0.2)
BASOPHILS NFR BLD AUTO: 0 % — SIGNIFICANT CHANGE UP (ref 0–2)
BILIRUB SERPL-MCNC: 0.2 MG/DL — SIGNIFICANT CHANGE UP (ref 0.2–1.2)
BUN SERPL-MCNC: 8 MG/DL — SIGNIFICANT CHANGE UP (ref 7–23)
BURR CELLS BLD QL SMEAR: PRESENT — SIGNIFICANT CHANGE UP
CALCIUM SERPL-MCNC: 8.5 MG/DL — SIGNIFICANT CHANGE UP (ref 8.4–10.5)
CHLORIDE SERPL-SCNC: 106 MMOL/L — SIGNIFICANT CHANGE UP (ref 96–108)
CO2 SERPL-SCNC: 22 MMOL/L — SIGNIFICANT CHANGE UP (ref 22–31)
CREAT SERPL-MCNC: 0.64 MG/DL — SIGNIFICANT CHANGE UP (ref 0.5–1.3)
DACRYOCYTES BLD QL SMEAR: SLIGHT — SIGNIFICANT CHANGE UP
EGFR: 94 ML/MIN/1.73M2 — SIGNIFICANT CHANGE UP
EOSINOPHIL # BLD AUTO: 0.31 K/UL — SIGNIFICANT CHANGE UP (ref 0–0.5)
EOSINOPHIL NFR BLD AUTO: 6.1 % — HIGH (ref 0–6)
GIANT PLATELETS BLD QL SMEAR: PRESENT — SIGNIFICANT CHANGE UP
GLUCOSE BLDC GLUCOMTR-MCNC: 100 MG/DL — HIGH (ref 70–99)
GLUCOSE BLDC GLUCOMTR-MCNC: 132 MG/DL — HIGH (ref 70–99)
GLUCOSE BLDC GLUCOMTR-MCNC: 138 MG/DL — HIGH (ref 70–99)
GLUCOSE SERPL-MCNC: 86 MG/DL — SIGNIFICANT CHANGE UP (ref 70–99)
HCT VFR BLD CALC: 26.6 % — LOW (ref 34.5–45)
HGB BLD-MCNC: 8.2 G/DL — LOW (ref 11.5–15.5)
HYPOCHROMIA BLD QL: SIGNIFICANT CHANGE UP
LYMPHOCYTES # BLD AUTO: 2.17 K/UL — SIGNIFICANT CHANGE UP (ref 1–3.3)
LYMPHOCYTES # BLD AUTO: 42.6 % — SIGNIFICANT CHANGE UP (ref 13–44)
MACROCYTES BLD QL: SLIGHT — SIGNIFICANT CHANGE UP
MAGNESIUM SERPL-MCNC: 1.5 MG/DL — LOW (ref 1.6–2.6)
MANUAL SMEAR VERIFICATION: SIGNIFICANT CHANGE UP
MCHC RBC-ENTMCNC: 22.2 PG — LOW (ref 27–34)
MCHC RBC-ENTMCNC: 30.8 GM/DL — LOW (ref 32–36)
MCV RBC AUTO: 72.1 FL — LOW (ref 80–100)
MICROCYTES BLD QL: SLIGHT — SIGNIFICANT CHANGE UP
MONOCYTES # BLD AUTO: 0.49 K/UL — SIGNIFICANT CHANGE UP (ref 0–0.9)
MONOCYTES NFR BLD AUTO: 9.6 % — SIGNIFICANT CHANGE UP (ref 2–14)
NEUTROPHILS # BLD AUTO: 2.12 K/UL — SIGNIFICANT CHANGE UP (ref 1.8–7.4)
NEUTROPHILS NFR BLD AUTO: 41.7 % — LOW (ref 43–77)
OVALOCYTES BLD QL SMEAR: SLIGHT — SIGNIFICANT CHANGE UP
PHOSPHATE SERPL-MCNC: 3 MG/DL — SIGNIFICANT CHANGE UP (ref 2.5–4.5)
PLAT MORPH BLD: ABNORMAL
PLATELET # BLD AUTO: 230 K/UL — SIGNIFICANT CHANGE UP (ref 150–400)
POIKILOCYTOSIS BLD QL AUTO: SIGNIFICANT CHANGE UP
POTASSIUM SERPL-MCNC: 4.4 MMOL/L — SIGNIFICANT CHANGE UP (ref 3.5–5.3)
POTASSIUM SERPL-SCNC: 4.4 MMOL/L — SIGNIFICANT CHANGE UP (ref 3.5–5.3)
PROT C ACT/NOR PPP: 91 % — SIGNIFICANT CHANGE UP (ref 74–150)
PROT SERPL-MCNC: 7.1 G/DL — SIGNIFICANT CHANGE UP (ref 6–8.3)
RBC # BLD: 3.69 M/UL — LOW (ref 3.8–5.2)
RBC # FLD: 16.8 % — HIGH (ref 10.3–14.5)
RBC BLD AUTO: ABNORMAL
SCHISTOCYTES BLD QL AUTO: SLIGHT — SIGNIFICANT CHANGE UP
SODIUM SERPL-SCNC: 136 MMOL/L — SIGNIFICANT CHANGE UP (ref 135–145)
SPHEROCYTES BLD QL SMEAR: SLIGHT — SIGNIFICANT CHANGE UP
WBC # BLD: 5.09 K/UL — SIGNIFICANT CHANGE UP (ref 3.8–10.5)
WBC # FLD AUTO: 5.09 K/UL — SIGNIFICANT CHANGE UP (ref 3.8–10.5)

## 2023-05-02 PROCEDURE — 93306 TTE W/DOPPLER COMPLETE: CPT | Mod: 26

## 2023-05-02 PROCEDURE — 99232 SBSQ HOSP IP/OBS MODERATE 35: CPT

## 2023-05-02 RX ORDER — MAGNESIUM SULFATE 500 MG/ML
1 VIAL (ML) INJECTION ONCE
Refills: 0 | Status: COMPLETED | OUTPATIENT
Start: 2023-05-02 | End: 2023-05-02

## 2023-05-02 RX ORDER — OXYCODONE AND ACETAMINOPHEN 5; 325 MG/1; MG/1
1 TABLET ORAL
Qty: 21 | Refills: 0
Start: 2023-05-02 | End: 2023-05-08

## 2023-05-02 RX ORDER — ATORVASTATIN CALCIUM 80 MG/1
1 TABLET, FILM COATED ORAL
Qty: 30 | Refills: 0
Start: 2023-05-02 | End: 2023-05-31

## 2023-05-02 RX ADMIN — OXYCODONE HYDROCHLORIDE 5 MILLIGRAM(S): 5 TABLET ORAL at 03:50

## 2023-05-02 RX ADMIN — RIVAROXABAN 20 MILLIGRAM(S): KIT at 12:43

## 2023-05-02 RX ADMIN — Medication 975 MILLIGRAM(S): at 06:11

## 2023-05-02 RX ADMIN — OXYCODONE HYDROCHLORIDE 10 MILLIGRAM(S): 5 TABLET ORAL at 06:10

## 2023-05-02 RX ADMIN — Medication 975 MILLIGRAM(S): at 15:01

## 2023-05-02 RX ADMIN — BUDESONIDE AND FORMOTEROL FUMARATE DIHYDRATE 2 PUFF(S): 160; 4.5 AEROSOL RESPIRATORY (INHALATION) at 18:07

## 2023-05-02 RX ADMIN — Medication 975 MILLIGRAM(S): at 22:30

## 2023-05-02 RX ADMIN — BUDESONIDE AND FORMOTEROL FUMARATE DIHYDRATE 2 PUFF(S): 160; 4.5 AEROSOL RESPIRATORY (INHALATION) at 06:11

## 2023-05-02 RX ADMIN — LISINOPRIL 40 MILLIGRAM(S): 2.5 TABLET ORAL at 21:53

## 2023-05-02 RX ADMIN — Medication 100 GRAM(S): at 11:24

## 2023-05-02 RX ADMIN — POLYETHYLENE GLYCOL 3350 17 GRAM(S): 17 POWDER, FOR SOLUTION ORAL at 18:07

## 2023-05-02 RX ADMIN — POLYETHYLENE GLYCOL 3350 17 GRAM(S): 17 POWDER, FOR SOLUTION ORAL at 06:11

## 2023-05-02 RX ADMIN — Medication 975 MILLIGRAM(S): at 06:45

## 2023-05-02 RX ADMIN — OXYCODONE HYDROCHLORIDE 10 MILLIGRAM(S): 5 TABLET ORAL at 00:45

## 2023-05-02 RX ADMIN — Medication 975 MILLIGRAM(S): at 21:53

## 2023-05-02 RX ADMIN — OXYCODONE HYDROCHLORIDE 5 MILLIGRAM(S): 5 TABLET ORAL at 03:18

## 2023-05-02 RX ADMIN — OXYCODONE HYDROCHLORIDE 10 MILLIGRAM(S): 5 TABLET ORAL at 23:22

## 2023-05-02 RX ADMIN — OXYCODONE HYDROCHLORIDE 10 MILLIGRAM(S): 5 TABLET ORAL at 06:46

## 2023-05-02 RX ADMIN — TIOTROPIUM BROMIDE 2 PUFF(S): 18 CAPSULE ORAL; RESPIRATORY (INHALATION) at 12:43

## 2023-05-02 RX ADMIN — OXYCODONE HYDROCHLORIDE 10 MILLIGRAM(S): 5 TABLET ORAL at 17:09

## 2023-05-02 RX ADMIN — OXYCODONE HYDROCHLORIDE 10 MILLIGRAM(S): 5 TABLET ORAL at 18:00

## 2023-05-02 RX ADMIN — Medication 975 MILLIGRAM(S): at 14:34

## 2023-05-02 RX ADMIN — AMLODIPINE BESYLATE 5 MILLIGRAM(S): 2.5 TABLET ORAL at 06:11

## 2023-05-02 RX ADMIN — OXYCODONE HYDROCHLORIDE 5 MILLIGRAM(S): 5 TABLET ORAL at 22:30

## 2023-05-02 RX ADMIN — ATORVASTATIN CALCIUM 40 MILLIGRAM(S): 80 TABLET, FILM COATED ORAL at 21:52

## 2023-05-02 RX ADMIN — OXYCODONE HYDROCHLORIDE 5 MILLIGRAM(S): 5 TABLET ORAL at 21:52

## 2023-05-02 NOTE — DISCHARGE NOTE PROVIDER - CARE PROVIDER_API CALL
Ignacio Yost)  Orthopaedic Surgery  [address] [facility], [address]  [place] [address]  Phone: [phone]  Fax: [phone]  Follow Up Time:     Donnie Hughes)  Vascular Surgery  [address] [place] [address]  Phone: [phone]  Fax: [phone]  Follow Up Time:     JM AKINS  Internal Medicine  [facility] [facility] [place] [address]  Phone: ([phone]  Fax: ()-  Follow Up Time: 2 weeks   Ignacio Yost)  Orthopaedic Surgery  [address] [facility], [address]  [place] [address]  Phone: [phone]  Fax: [phone]  Follow Up Time:     Donnie Hughes)  Vascular Surgery  [address], [address] [place] [address]  Phone: [phone]  Fax: [phone]  Follow Up Time:     Richa Ferrara  Internal Medicine  [address] [address]  Phone: [phone]  Fax: (   )    -  Established Patient  Scheduled Appointment: 05/03/2023 12:30 PM   Ignacio Yost (MD)  Orthopaedic Surgery  [address] [facility], [address]  [place] [address]  Phone: [phone]  Fax: [phone]  Follow Up Time:     Donnie Hughes)  Vascular Surgery  [address], [address] [place] [address]  Phone: [phone]  Fax: [phone]  Scheduled Appointment: 05/15/2023 11:30 AM    Richa Ferrara  Internal Medicine  [address] [address]  Phone: [phone]  Fax: (   )    -  Established Patient  Scheduled Appointment: 05/03/2023 12:30 PM

## 2023-05-02 NOTE — PROGRESS NOTE ADULT - PROBLEM SELECTOR PLAN 1
CT LE showing bone infarct in b/l femurs and tibias. CTA LLE showing severe atherosclerotic disease in L superficial femoral artery. Pain 2/2 bone infarcts vs atherosclerotic disease. Ortho and vascular consulted in ED.   Per ED note, ortho feels bone infarcts caused by long term steroid use (lots of steroid use in asthma hx) vs malignancy. Vascular feels the superficial stenosis is unlikely to be cause of pain.   Consider malignancy vs hypercoaguability  - pain management: tylenol 975 mg PO standing, oxycodone 5mg q6 PRN for moderate pain, oxycodone 10mg q6 PRN for severe pain  - f/u ortho recs  - PT recommended home PT   - collateral on cancer screenings, per patient colonoscopy was many years back. mammogram?  - f/u hypercoaguability workup: Lupus panel, factor V leiden, prothrombin gene mutation, protein C, protein S, activated protein C resistance assay, antithrombin III assay  - f/u TTE

## 2023-05-02 NOTE — DISCHARGE NOTE PROVIDER - CARE PROVIDERS DIRECT ADDRESSES
[email or web address],[email or web address],DirectAddress_Unknown

## 2023-05-02 NOTE — DISCHARGE NOTE NURSING/CASE MANAGEMENT/SOCIAL WORK - NSDCFUADDAPPT_GEN_ALL_CORE_FT
Please bring your Insurance card, Photo ID and Discharge paperwork to the following appointment:    (1) Please follow up with your Primary Care Provider, Dr. Richa Ferrara at [address] [place] [address] on 05/03/2023 at 12:30pm.    Appointment was scheduled by Ms. SHIMON Mccracken, Referral Coordinator.        (2) You may follow up with orthopedic surgery, Dr. Yost, as an outpatient. Please call [phone] to schedule an appointment.     (3) You may follow up with vascular surgery, Dr. Hughes, as an outpatient. Please call [phone] to schedule an appointment.     (4) Please attend your appointment with pain management on 5/11/23.

## 2023-05-02 NOTE — PROGRESS NOTE ADULT - PROBLEM SELECTOR PLAN 3
CTA showing severe atherosclerotic disease in L superficial femoral artery. Vascular consulted in ED, no acute surgical intervention. Can follow up w/ Dr. Hughes as outpatient.  - c/w home med xarelto 20mg qd  - started on atorvastatin 40mg daily  - ask vascular if should start aspirin

## 2023-05-02 NOTE — PROGRESS NOTE ADULT - ATTENDING COMMENTS
72 y/o F w/ PMH of DVT s/p IVC filter then clot and IVC filter removal, asthma and HTN who is presenting to the ED w/ L knee pain worsening over the past 2 months, imaging showing bone infarct, admitted for pain control.     Labs and imaging reviewed    Problem List  #Bone infarcts - TTE, Hypercoag work up, MRI, Pain Control    Rest as above
73F with PMH of DVT, asthma and hypertension presenting with bone infarcts, and admitted for further workup.     Labs and imaging reviewed    #Bone infarcts - TTE pending, hypercoagulable workup pending, repeat MRI showing osteonecrosis, reaching out to orthopedic team about further management  - continue with current pain regimen  - will need to follow up closely with orthopedics and pain management    Anemia is stable    >35 minutes spent on this encounter, including face to face with patient, care coordination and documentation
73F with PMH of DVT, asthma and hypertension presenting with bone infarcts, and admitted for further workup.     Labs and imaging reviewed    #Bone infarcts - TTE without thrombus, hypercoagulable workup is slowly returning, and nothing remarkable at this time.  May take a few more days for results to finalize, but these can be followed up in the outpatient setting., repeat MRI showing osteonecrosis, reaching out to orthopedic team about further management  - continue with current pain regimen  - will need to follow up closely with orthopedics and pain management    Anemia is stable    >35 minutes spent on this encounter, including face to face with patient, care coordination and documentation

## 2023-05-02 NOTE — PROGRESS NOTE ADULT - ASSESSMENT
72 y/o F w/ PMH of DVT s/p IVC filter then clot and IVC filter removal, asthma and HTN who is presenting to the ED w/ L knee pain worsening over the past 2 months, imaging showing bone infarct, admitted for pain control. 
74 y/o F w/ PMH of DVT s/p IVC filter then clot and IVC filter removal, asthma and HTN who is presenting to the ED w/ L knee pain worsening over the past 2 months, imaging showing bone infarct, admitted for pain control. 
72 y/o F w/ PMH of DVT s/p IVC filter then clot and IVC filter removal, asthma and HTN who is presenting to the ED w/ L knee pain worsening over the past 2 months, imaging showing bone infarct, admitted for pain control.

## 2023-05-02 NOTE — PROGRESS NOTE ADULT - PROBLEM SELECTOR PLAN 6
Home meds: lisinopril 40mg qd, amlodipine 5mg qd  - continue home meds

## 2023-05-02 NOTE — DISCHARGE NOTE PROVIDER - HOSPITAL COURSE
#Discharge: do not delete    72 y/o F w/ PMH of DVT s/p IVC filter then clot and IVC filter removal, asthma and HTN who is presenting to the ED w/ L knee pain worsening over the past 2 months, imaging showing bone infarct, admitted for pain control.   ·  Problem: Bone infarction of left leg.   ·  Plan: CT LE showing bone infarct in b/l femurs and tibias. CTA LLE showing severe atherosclerotic disease in L superficial femoral artery. Pain 2/2 bone infarcts vs atherosclerotic disease. Ortho and vascular consulted in ED.   Per ED note, ortho feels bone infarcts caused by long term steroid use (lots of steroid use in asthma hx) vs malignancy. Vascular feels the superficial stenosis is unlikely to be cause of pain.   Consider malignancy vs hypercoaguability. TTE showing normal LV and RV size and function, aortic sclerosis present without significant stenosis.   - pain management: tylenol 975 mg PO standing, oxycodone 5mg q6 PRN for moderate pain, oxycodone 10mg q6 PRN for severe pain   - ortho consulted, no acute intervention, patient may follow up with Dr. Yost as an outpatient  - outpatient follow up for routine cancer screening  - PT recommended home PT   - f/u hypercoaguability workup: Lupus panel, factor V leiden, prothrombin gene mutation, protein C, protein S, activated protein C resistance assay, antithrombin III assay    ·  Problem: Deep vein thrombosis (DVT).   ·  Plan: Hx of DVT 20 years ago, trialed coumadin but had IVC filter placed instead. IVC filter and clot removed in 2/2023. Unknown if DVT was provoked or not.  - c/w xarelto 20mg daily  - f/u hypercoagulability workup.    ·  Problem: PAD (peripheral artery disease).   ·  Plan: CTA showing severe atherosclerotic disease in L superficial femoral artery. Vascular consulted in ED, no acute surgical intervention. Can follow up w/ Dr. Hughes as outpatient.  - c/w home med xarelto 20mg qd  - started on atorvastatin 40mg daily    ·  Problem: Anemia.   ·  Plan: Hgb 8.2 on admission w/ low MCV. Unknown baseline. Per patient she has heard of having a low blood count in the past but does not know why.  - Iron panel suggests STARR.    ·  Problem: Asthma.   ·  Plan: History of many asthma exacerbations in the past with a lot of steroid use. Patient reports that she is now in remission since she stopped smoking 10 years ago.  Home meds: albuterol PRN, trelegy  - continue home meds.    ·  Problem: HTN (hypertension).   ·  Plan: Home meds: lisinopril 40mg qd, amlodipine 5mg qd  - continue home meds.    ·  Problem: DM (diabetes mellitus).   ·  Plan: Home med: metformin 1000mg daily. Patient reports that she does not have diabetes, she was just put on metformin because she has been on steroids so much in the past that her sugar used to be elevated.  - mISS  - A1c 5.2%.      Patient was discharged to: home  New medications: pain medication as above, atorvastatin 40mg daily  Changes to old medications: none  Items to follow up as outpatient: mammogram, colonoscopy #Discharge: do not delete    74 y/o F w/ PMH of DVT s/p IVC filter then clot and IVC filter removal, asthma and HTN who is presenting to the ED w/ L knee pain worsening over the past 2 months, imaging showing bone infarct, admitted for pain control.   ·  Problem: Bone infarction of left leg.   ·  Plan: CT LE showing bone infarct in b/l femurs and tibias. CTA LLE showing severe atherosclerotic disease in L superficial femoral artery. Pain 2/2 bone infarcts vs atherosclerotic disease. Ortho and vascular consulted in ED.   Per ED note, ortho feels bone infarcts caused by long term steroid use (lots of steroid use in asthma hx) vs malignancy. Vascular feels the superficial stenosis is unlikely to be cause of pain.   Consider malignancy vs hypercoaguability. TTE showing normal LV and RV size and function, aortic sclerosis present without significant stenosis.   - pain management: tylenol 975 mg PO standing, oxycodone 5mg q6 PRN for moderate pain, oxycodone 10mg q6 PRN for severe pain   - ortho consulted, no acute intervention, patient may follow up with Dr. Yost as an outpatient  - outpatient follow up for routine cancer screening  - PT recommended home PT   - f/u hypercoaguability workup: Lupus panel, factor V leiden, prothrombin gene mutation, protein C, protein S, activated protein C resistance assay, antithrombin III assay    #hepatitis C  - hepatitis C antibody reactive, hepatitis C RNA negative, likely cleared infection  - f/u as outpatient    ·  Problem: Deep vein thrombosis (DVT).   ·  Plan: Hx of DVT 20 years ago, trialed coumadin but had IVC filter placed instead. IVC filter and clot removed in 2/2023. Unknown if DVT was provoked or not.  - c/w xarelto 20mg daily  - f/u hypercoagulability workup.    ·  Problem: PAD (peripheral artery disease).   ·  Plan: CTA showing severe atherosclerotic disease in L superficial femoral artery. Vascular consulted in ED, no acute surgical intervention. Can follow up w/ Dr. Hughes as outpatient.  - c/w home med xarelto 20mg qd  - started on atorvastatin 40mg daily    ·  Problem: Anemia.   ·  Plan: Hgb 8.2 on admission w/ low MCV. Unknown baseline. Per patient she has heard of having a low blood count in the past but does not know why.  - Iron panel suggests STARR.    ·  Problem: Asthma.   ·  Plan: History of many asthma exacerbations in the past with a lot of steroid use. Patient reports that she is now in remission since she stopped smoking 10 years ago.  Home meds: albuterol PRN, trelegy  - continue home meds.    ·  Problem: HTN (hypertension).   ·  Plan: Home meds: lisinopril 40mg qd, amlodipine 5mg qd  - continue home meds.    ·  Problem: DM (diabetes mellitus).   ·  Plan: Home med: metformin 1000mg daily. Patient reports that she does not have diabetes, she was just put on metformin because she has been on steroids so much in the past that her sugar used to be elevated.  - mISS  - A1c 5.2%.      Patient was discharged to: home  New medications: pain medication as above, atorvastatin 40mg daily  Changes to old medications: none  Items to follow up as outpatient: mammogram, colonoscopy #Discharge: do not delete    72 y/o F w/ PMH of DVT s/p IVC filter then clot and IVC filter removal, asthma and HTN who is presenting to the ED w/ L knee pain worsening over the past 2 months, imaging showing bone infarct, admitted for pain control.   ·  Problem: Bone infarction of left leg.   ·  Plan: CT LE showing bone infarct in b/l femurs and tibias. CTA LLE showing severe atherosclerotic disease in L superficial femoral artery. Pain 2/2 bone infarcts vs atherosclerotic disease. Ortho and vascular consulted in ED.   Per ED note, ortho feels bone infarcts caused by long term steroid use (lots of steroid use in asthma hx) vs malignancy. Vascular feels the superficial stenosis is unlikely to be cause of pain.   Consider malignancy vs hypercoagulability. TTE showing normal LV and RV size and function, aortic sclerosis present without significant stenosis.   - pain management: tylenol 975 mg PO standing, oxycodone 5mg q6 PRN for moderate pain, oxycodone 10mg q6 PRN for severe pain   - ortho consulted, no acute intervention, patient may follow up with Dr. Yost as an outpatient  - outpatient follow up for routine cancer screening  - PT recommended home PT   - f/u hypercoaguability workup: Lupus panel, factor V leiden, prothrombin gene mutation, protein C, protein S, activated protein C resistance assay, antithrombin III assay    #hepatitis C  - hepatitis C antibody reactive, hepatitis C RNA negative, likely cleared infection  - f/u as outpatient    ·  Problem: Deep vein thrombosis (DVT).   ·  Plan: Hx of DVT 20 years ago, trialed coumadin but had IVC filter placed instead. IVC filter and clot removed in 2/2023. Unknown if DVT was provoked or not.  - c/w xarelto 20mg daily  - f/u hypercoagulability workup.    ·  Problem: PAD (peripheral artery disease).   ·  Plan: CTA showing severe atherosclerotic disease in L superficial femoral artery. Vascular consulted in ED, no acute surgical intervention. Can follow up w/ Dr. Hughes as outpatient.  - c/w home med xarelto 20mg qd  - started on atorvastatin 40mg daily    ·  Problem: Anemia.   ·  Plan: Hgb 8.2 on admission w/ low MCV. Unknown baseline. Per patient she has heard of having a low blood count in the past but does not know why.  - Iron panel suggests STARR.    ·  Problem: Asthma.   ·  Plan: History of many asthma exacerbations in the past with a lot of steroid use. Patient reports that she is now in remission since she stopped smoking 10 years ago.  Home meds: albuterol PRN, trelegy  - continue home meds.    ·  Problem: HTN (hypertension).   ·  Plan: Home meds: lisinopril 40mg qd, amlodipine 5mg qd  - continue home meds.    ·  Problem: DM (diabetes mellitus).   ·  Plan: Home med: metformin 1000mg daily. Patient reports that she does not have diabetes, she was just put on metformin because she has been on steroids so much in the past that her sugar used to be elevated.  - mISS  - A1c 5.2%.      Patient was discharged to: home  New medications: pain medication as above, atorvastatin 40mg daily  Changes to old medications: none  Items to follow up as outpatient: mammogram, colonoscopy #Discharge: do not delete    72 y/o F w/ PMH of DVT s/p IVC filter then clot and IVC filter removal, asthma and HTN who is presenting to the ED w/ L knee pain worsening over the past 2 months, imaging showing bone infarct, admitted for pain control.     #Bone infarction of left leg.   CT LE showing bone infarct in b/l femurs and tibias. CTA LLE showing severe atherosclerotic disease in L superficial femoral artery. Pain 2/2 bone infarcts vs atherosclerotic disease. Ortho and vascular consulted in ED.   Per ED note, ortho feels bone infarcts caused by long term steroid use (lots of steroid use in asthma hx) vs malignancy. Vascular feels the superficial stenosis is unlikely to be cause of pain.   Consider malignancy vs hypercoagulability. TTE showing normal LV and RV size and function, aortic sclerosis present without significant stenosis.   - pain management: tylenol 975 mg PO standing, oxycodone 5mg q6 PRN for moderate pain, oxycodone 10mg q6 PRN for severe pain   - ortho consulted, no acute intervention, patient may follow up with Dr. Yost as an outpatient  - outpatient follow up for routine cancer screening  - PT recommended home PT   - f/u hypercoaguability workup: Lupus panel, factor V leiden, prothrombin gene mutation, protein C, protein S, activated protein C resistance assay, antithrombin III assay    #Hx of Hepatitis C  Hepatitis C antibody reactive, hepatitis C RNA negative. Serology consistent with a cleared past infection.     #Deep vein thrombosis (DVT).   ·  Plan: Hx of DVT 20 years ago, trialed coumadin but had IVC filter placed instead. IVC filter and clot removed in 2/2023. Unknown if DVT was provoked or not.  - c/w xarelto 20mg daily  - f/u hypercoagulability workup.    #PAD (peripheral artery disease).   CTA showing severe atherosclerotic disease in L superficial femoral artery. Vascular consulted in ED, no acute surgical intervention. Can follow up w/ Dr. Hughes as outpatient.  - c/w home med xarelto 20mg qd  - started on atorvastatin 40mg daily    #Anemia.   Hgb 8.2 on admission w/ low MCV. Unknown baseline. Per patient she has heard of having a low blood count in the past but does not know why. Iron panel suggests STARR.  - Started on ferrous sulfate 325mg daily    #Asthma.   History of many asthma exacerbations in the past with a lot of steroid use. Patient reports that she is now in remission since she stopped smoking 10 years ago.  Home meds: albuterol PRN, trelegy  - continue home meds.    #HTN (hypertension).   Home meds: lisinopril 40mg qd, amlodipine 5mg qd  - continue home meds.    #DM (diabetes mellitus).   Home med: metformin 1000mg daily. Patient reports that she does not have diabetes, she was just put on metformin because she has been on steroids so much in the past that her sugar used to be elevated.  - mISS  - A1c 5.2%.      Patient was discharged to: home  New medications: pain medication as above, atorvastatin 40mg daily  Changes to old medications: none  Items to follow up as outpatient: mammogram, colonoscopy, hypercoagulability workup as above    Physical exam on discharge:  Constitutional: resting comfortably in bed; NAD  HEENT: NC/AT, PER, anicteric sclera, no nasal discharge; MMM  Neck: supple; no JVD or thyromegaly  Respiratory: CTA B/L; no W/R/R, no retractions  Cardiac: +S1/S2; RRR; no M/R/G  Gastrointestinal: soft, NT/ND; no rebound or guarding  Extremities: WWP, no clubbing or cyanosis; no peripheral edema  Musculoskeletal: ROM L knee limited 2/2 pain; no joint swelling, tenderness or erythema  Vascular: 2+ radial, DP/PT pulses B/L  Neurologic: AAOx3; CNII-XII grossly intact; no focal deficits  Psychiatric: affect and characteristics of appearance, verbalizations, behaviors are appropriate

## 2023-05-02 NOTE — DISCHARGE NOTE NURSING/CASE MANAGEMENT/SOCIAL WORK - PATIENT PORTAL LINK FT
You can access the FollowMyHealth Patient Portal offered by [facility] by registering at the following website: [email or web address]. By joining [employer]’s FollowMyHealth portal, you will also be able to view your health information using other applications (apps) compatible with our system.

## 2023-05-02 NOTE — DISCHARGE NOTE PROVIDER - NSDCCPCAREPLAN_GEN_ALL_CORE_FT
PRINCIPAL DISCHARGE DIAGNOSIS  Diagnosis: Pain in left leg  Assessment and Plan of Treatment: You were found to have a bone infarction in your leg, likely causing significant pain. This could have been caused by either a hypercoaguability disorder, making your more at risk for blood clotting, or possible malignancy. Please follow up with your primary care doctor to follow up the hypercoaguable work up with have sent here, and for up to date cancer screening including colonoscopy and mammogram. You may follow up with orthopaedic surgery, Dr. Yost as an outpatient. Please take your pain medications as prescribed and follow up with your pain management doctor.      SECONDARY DISCHARGE DIAGNOSES  Diagnosis: PAD (peripheral artery disease)  Assessment and Plan of Treatment: You have peripheral artery disease which is condition where narrowed blood vessels reduce blood flow to your limbs. Please continue to take xarelto 20mg daily as prescribed by your outpatient doctor. We have also prescribed atorvastatin 40mg taken once daily at bedtime, which is a medication to lower your cholesterol.

## 2023-05-02 NOTE — DISCHARGE NOTE PROVIDER - NSDCMRMEDTOKEN_GEN_ALL_CORE_FT
Albuterol (Eqv-ProAir HFA) 90 mcg/inh inhalation aerosol: 2 inhaled every 6 hours as needed for  shortness of breath and/or wheezing  amLODIPine 5 mg oral tablet: 1 orally once a day  atorvastatin 40 mg oral tablet: 1 tab(s) orally once a day (at bedtime)  lisinopril 40 mg oral tablet: 1 orally once a day  metFORMIN 1000 mg oral tablet: 1 orally once a day  Trelegy Ellipta 100 mcg-62.5 mcg-25 mcg/inh inhalation powder: 1 inhaled once a day  Xarelto 20 mg oral tablet: 1 orally once a day   Albuterol (Eqv-ProAir HFA) 90 mcg/inh inhalation aerosol: 2 inhaled every 6 hours as needed for  shortness of breath and/or wheezing  amLODIPine 5 mg oral tablet: 1 orally once a day  atorvastatin 40 mg oral tablet: 1 tab(s) orally once a day (at bedtime)  lisinopril 40 mg oral tablet: 1 orally once a day  metFORMIN 1000 mg oral tablet: 1 orally once a day  oxycodone-acetaminophen 5 mg-325 mg oral tablet: 1 tab(s) orally every 8 hours as needed for  severe pain MDD: 3  tabs  Trelegy Ellipta 100 mcg-62.5 mcg-25 mcg/inh inhalation powder: 1 inhaled once a day  Xarelto 20 mg oral tablet: 1 orally once a day   Albuterol (Eqv-ProAir HFA) 90 mcg/inh inhalation aerosol: 2 inhaled every 6 hours as needed for  shortness of breath and/or wheezing  amLODIPine 5 mg oral tablet: 1 orally once a day  atorvastatin 40 mg oral tablet: 1 tab(s) orally once a day (at bedtime)  ferrous sulfate 325 mg (65 mg elemental iron) oral tablet: 1 tab(s) orally once a day  lisinopril 40 mg oral tablet: 1 orally once a day  metFORMIN 1000 mg oral tablet: 1 orally once a day  MiraLax oral powder for reconstitution: 17 gram(s) orally 2 times a day as needed for  constipation  oxycodone-acetaminophen 5 mg-325 mg oral tablet: 1 tab(s) orally every 8 hours as needed for  severe pain MDD: 3  tabs  senna leaf extract oral tablet: 2 tab(s) orally once a day (at bedtime)  Trelegy Ellipta 100 mcg-62.5 mcg-25 mcg/inh inhalation powder: 1 inhaled once a day  Xarelto 20 mg oral tablet: 1 orally once a day

## 2023-05-02 NOTE — DISCHARGE NOTE PROVIDER - PROVIDER TOKENS
PROVIDER:[TOKEN:[[id number]]],PROVIDER:[TOKEN:[[id number]]],PROVIDER:[TOKEN:[[id number]],FOLLOWUP:[2 weeks]] PROVIDER:[TOKEN:[[id number]]],PROVIDER:[TOKEN:[[id number]]],FREE:[LAST:[Josias],FIRST:[Richa],PHONE:[phone]],FAX:[(   )    -],ADDRESS:[Internal Medicine  [address] [place] [address]],SCHEDULEDAPPT:[05/03/2023],SCHEDULEDAPPTTIME:[12:30 PM],ESTABLISHEDPATIENT:[T]] PROVIDER:[TOKEN:[[id number]]],PROVIDER:[TOKEN:[[id number]],SCHEDULEDAPPT:[05/15/2023],SCHEDULEDAPPTTIME:[11:30 AM]],FREE:[LAST:[Josias],FIRST:[Richa],PHONE:[phone]],FAX:[(   )    -],ADDRESS:[Internal Medicine  [address] [place] [address]],SCHEDULEDAPPT:[05/03/2023],SCHEDULEDAPPTTIME:[12:30 PM],ESTABLISHEDPATIENT:[T]]

## 2023-05-02 NOTE — PROGRESS NOTE ADULT - PROBLEM SELECTOR PLAN 5
History of many asthma exacerbations in the past with a lot of steroid use. Patient reports that she is now in remission since she stopped smoking 10 years ago.  Home meds: albuterol PRN, trelegy  - continue home meds

## 2023-05-02 NOTE — PROGRESS NOTE ADULT - PROBLEM SELECTOR PLAN 4
Hgb 8.2 on admission w/ low MCV. Unknown baseline. Per patient she has heard of having a low blood count in the past but does not know why.  - Iron panel suggests STARR

## 2023-05-02 NOTE — DISCHARGE NOTE PROVIDER - NSDCFUADDAPPT_GEN_ALL_CORE_FT
You may follow up with orthopedic surgery, Dr. Yost, as an outpatient. Please call [phone] to schedule an appointment.     You may follow up with vascular surgery, Dr. Hughes, as an outpatient. Please call [phone] to schedule an appointment.     Please attend your appointment with pain management on 5/11/23.  Please bring your Insurance card, Photo ID and Discharge paperwork to the following appointment:    (1) Please follow up with your Primary Care Provider, Dr. Richa Ferrara at [address] [place] [address] on 05/03/2023 at 12:30pm.    Appointment was scheduled by Ms. SHIMON Mccracken, Referral Coordinator.        (2) You may follow up with orthopedic surgery, Dr. Yost, as an outpatient. Please call [phone] to schedule an appointment.     (3) You may follow up with vascular surgery, Dr. Hughes, as an outpatient. Please call [phone] to schedule an appointment.     (4) Please attend your appointment with pain management on 5/11/23.  Please bring your Insurance card, Photo ID and Discharge paperwork to the following appointment:    (1) Please follow up with your Primary Care Provider, Dr. Richa Ferrara at [address] [place] [address] on 05/03/2023 at 12:30pm.    Appointment was scheduled by Ms. SHIMON Mccracken, Referral Coordinator.    (2) You may follow up with orthopedic surgery, Dr. Yost, as an outpatient. Please call [phone] to schedule an appointment.     (3) You may follow up with vascular surgery, Dr. Hughes, as an outpatient. Please call [phone] to schedule an appointment.     (4) Please attend your appointment with pain management on 5/11/23.  Please bring your Insurance card, Photo ID and Discharge paperwork to the following appointments:    (1) Please follow up with your Primary Care Provider, Dr. Richa Ferrara at [address] [address] on 05/03/2023 at 12:30pm.    Appointment was scheduled by Ms. SHIMON Mccracken, Referral Coordinator.    (2) Please follow up with your Vascular Surgery Provider, Dr. Donnie Hughes at [address], [place] [address] on 05/15/2023 at 11:30am.    Appointment was scheduled by Ms. SHIMON Mccracken, Referral Coordinator.    (3) You may follow up with orthopedic surgery, Dr. Yost, as an outpatient. Please call [phone] to schedule an appointment.       (4) Please attend your appointment with pain management on 5/11/23.

## 2023-05-02 NOTE — DISCHARGE NOTE PROVIDER - ATTENDING DISCHARGE PHYSICAL EXAMINATION:
73F with PMH of DVT, asthma and hypertension presenting with bone infarcts, and admitted for further workup.     Labs and imaging reviewed    Physical exam  General: no acute distress, sitting up in bed  HEENT: normocephalic, atraumatic, MMM  Cards: RRR, normal S1S2, no mrg  Pulm: CTAB, no wheeze, crackles, rales or rhonchi  Ab: soft, nontender, nondistended, normoactive bowel sounds  Ext: wwp, no edema, erythema  Neuro: grossly nonfocal exam, moves all extremities, follows commands      #Bone infarcts - TTE without thrombus, hypercoagulable workup is slowly returning, and nothing remarkable at this time.  May take a few more days for results to finalize, but these can be followed up in the outpatient setting., repeat MRI showing osteonecrosis, reaching out to orthopedic team about further management  - outpatient follow up with vascular surgery and oncologic orthopedics  - discharging on percocet   - iSTOP performed  - will need to follow up closely with orthopedics and pain management    Anemia is stable  Hypercoagulable workup so far is negative.  Will need to be followed up outpatient as further results continue to come back.     >35 minutes spent on this encounter, including face to face with patient, care coordination and documentation .

## 2023-05-02 NOTE — PROGRESS NOTE ADULT - SUBJECTIVE AND OBJECTIVE BOX
INTERVAL HPI/OVERNIGHT EVENTS:  Patient was seen and examined at bedside. As per nurse and patient, no o/n events, patient resting comfortably. No complaints at this time. Patient denies: fever, chills, lightheadedness, weakness, CP, palpitations, SOB, cough, N/V. ROS otherwise negative.    VITAL SIGNS:  T(F): 98.6 (05-02-23 @ 06:04)  HR: 73 (05-02-23 @ 06:04)  BP: 150/84 (05-02-23 @ 06:04)  RR: 16 (05-02-23 @ 06:04)  SpO2: 100% (05-02-23 @ 06:04)  Wt(kg): --        PHYSICAL EXAM:    Constitutional: resting comfortably in bed; NAD  HEENT: NC/AT, PER, anicteric sclera, no nasal discharge; MMM  Neck: supple; no JVD or thyromegaly  Respiratory: CTA B/L; no W/R/R, no retractions  Cardiac: +S1/S2; RRR; no M/R/G  Gastrointestinal: soft, NT/ND; no rebound or guarding  Extremities: WWP, no clubbing or cyanosis; no peripheral edema  Musculoskeletal: NROM x4; no joint swelling, tenderness or erythema  Vascular: 2+ radial, DP/PT pulses B/L  Neurologic: AAOx3; CNII-XII grossly intact; no focal deficits  Psychiatric: affect and characteristics of appearance, verbalizations, behaviors are appropriate    MEDICATIONS  (STANDING):  acetaminophen     Tablet .. 975 milliGRAM(s) Oral every 8 hours  amLODIPine   Tablet 5 milliGRAM(s) Oral every 24 hours  atorvastatin 40 milliGRAM(s) Oral at bedtime  budesonide  80 MICROgram(s)/formoterol 4.5 MICROgram(s) Inhaler 2 Puff(s) Inhalation two times a day  dextrose 5%. 1000 milliLiter(s) (100 mL/Hr) IV Continuous <Continuous>  dextrose 5%. 1000 milliLiter(s) (50 mL/Hr) IV Continuous <Continuous>  dextrose 50% Injectable 25 Gram(s) IV Push once  dextrose 50% Injectable 12.5 Gram(s) IV Push once  dextrose 50% Injectable 25 Gram(s) IV Push once  glucagon  Injectable 1 milliGRAM(s) IntraMuscular once  insulin lispro (ADMELOG) corrective regimen sliding scale   SubCutaneous Before meals and at bedtime  lisinopril 40 milliGRAM(s) Oral every 24 hours  polyethylene glycol 3350 17 Gram(s) Oral two times a day  rivaroxaban 20 milliGRAM(s) Oral daily  senna 2 Tablet(s) Oral at bedtime  tiotropium 2.5 MICROgram(s) Inhaler 2 Puff(s) Inhalation daily    MEDICATIONS  (PRN):  albuterol    90 MICROgram(s) HFA Inhaler 2 Puff(s) Inhalation every 6 hours PRN Shortness of Breath and/or Wheezing  cyclobenzaprine 5 milliGRAM(s) Oral three times a day PRN Muscle Spasm  dextrose Oral Gel 15 Gram(s) Oral once PRN Blood Glucose LESS THAN 70 milliGRAM(s)/deciliter  oxyCODONE    IR 5 milliGRAM(s) Oral every 6 hours PRN Moderate Pain (4 - 6)  oxyCODONE    IR 10 milliGRAM(s) Oral every 6 hours PRN Severe Pain (7 - 10)      Allergies    ibuprofen (Unknown)  penicillin (Unknown)    Intolerances        LABS:                        8.2    5.09  )-----------( 230      ( 02 May 2023 08:05 )             26.6     05-02    136  |  106  |  8   ----------------------------<  86  4.4   |  22  |  0.64    Ca    8.5      02 May 2023 08:05  Phos  3.0     05-02  Mg     1.5     05-02    TPro  7.1  /  Alb  3.2<L>  /  TBili  0.2  /  DBili  x   /  AST  16  /  ALT  9<L>  /  AlkPhos  76  05-02          RADIOLOGY & ADDITIONAL TESTS:  Reviewed

## 2023-05-03 VITALS
RESPIRATION RATE: 20 BRPM | OXYGEN SATURATION: 100 % | SYSTOLIC BLOOD PRESSURE: 132 MMHG | HEART RATE: 72 BPM | DIASTOLIC BLOOD PRESSURE: 72 MMHG | TEMPERATURE: 99 F

## 2023-05-03 PROBLEM — I10 ESSENTIAL (PRIMARY) HYPERTENSION: Chronic | Status: ACTIVE | Noted: 2023-04-29

## 2023-05-03 PROBLEM — I82.409 ACUTE EMBOLISM AND THROMBOSIS OF UNSPECIFIED DEEP VEINS OF UNSPECIFIED LOWER EXTREMITY: Chronic | Status: ACTIVE | Noted: 2023-04-28

## 2023-05-03 PROBLEM — I73.9 PERIPHERAL VASCULAR DISEASE, UNSPECIFIED: Chronic | Status: ACTIVE | Noted: 2023-04-29

## 2023-05-03 PROBLEM — J45.909 UNSPECIFIED ASTHMA, UNCOMPLICATED: Chronic | Status: ACTIVE | Noted: 2023-04-29

## 2023-05-03 LAB
GLUCOSE BLDC GLUCOMTR-MCNC: 130 MG/DL — HIGH (ref 70–99)
GLUCOSE BLDC GLUCOMTR-MCNC: 99 MG/DL — SIGNIFICANT CHANGE UP (ref 70–99)
PROT S FREE PPP-ACNC: 74 % — SIGNIFICANT CHANGE UP (ref 63–140)

## 2023-05-03 PROCEDURE — 73552 X-RAY EXAM OF FEMUR 2/>: CPT

## 2023-05-03 PROCEDURE — 85730 THROMBOPLASTIN TIME PARTIAL: CPT

## 2023-05-03 PROCEDURE — 83540 ASSAY OF IRON: CPT

## 2023-05-03 PROCEDURE — 86803 HEPATITIS C AB TEST: CPT

## 2023-05-03 PROCEDURE — 73502 X-RAY EXAM HIP UNI 2-3 VIEWS: CPT

## 2023-05-03 PROCEDURE — 85025 COMPLETE CBC W/AUTO DIFF WBC: CPT

## 2023-05-03 PROCEDURE — 82728 ASSAY OF FERRITIN: CPT

## 2023-05-03 PROCEDURE — 99285 EMERGENCY DEPT VISIT HI MDM: CPT | Mod: 25

## 2023-05-03 PROCEDURE — 85598 HEXAGNAL PHOSPH PLTLT NEUTRL: CPT

## 2023-05-03 PROCEDURE — 97161 PT EVAL LOW COMPLEX 20 MIN: CPT

## 2023-05-03 PROCEDURE — 86901 BLOOD TYPING SEROLOGIC RH(D): CPT

## 2023-05-03 PROCEDURE — 85306 CLOT INHIBIT PROT S FREE: CPT

## 2023-05-03 PROCEDURE — 81241 F5 GENE: CPT

## 2023-05-03 PROCEDURE — 85300 ANTITHROMBIN III ACTIVITY: CPT

## 2023-05-03 PROCEDURE — 86850 RBC ANTIBODY SCREEN: CPT

## 2023-05-03 PROCEDURE — 85610 PROTHROMBIN TIME: CPT

## 2023-05-03 PROCEDURE — 85307 ASSAY ACTIVATED PROTEIN C: CPT

## 2023-05-03 PROCEDURE — 93306 TTE W/DOPPLER COMPLETE: CPT

## 2023-05-03 PROCEDURE — 83735 ASSAY OF MAGNESIUM: CPT

## 2023-05-03 PROCEDURE — 73706 CT ANGIO LWR EXTR W/O&W/DYE: CPT | Mod: MA

## 2023-05-03 PROCEDURE — 99239 HOSP IP/OBS DSCHRG MGMT >30: CPT

## 2023-05-03 PROCEDURE — 93971 EXTREMITY STUDY: CPT

## 2023-05-03 PROCEDURE — 36415 COLL VENOUS BLD VENIPUNCTURE: CPT

## 2023-05-03 PROCEDURE — 85303 CLOT INHIBIT PROT C ACTIVITY: CPT

## 2023-05-03 PROCEDURE — 94640 AIRWAY INHALATION TREATMENT: CPT

## 2023-05-03 PROCEDURE — 83550 IRON BINDING TEST: CPT

## 2023-05-03 PROCEDURE — 96374 THER/PROPH/DIAG INJ IV PUSH: CPT | Mod: XU

## 2023-05-03 PROCEDURE — 83036 HEMOGLOBIN GLYCOSYLATED A1C: CPT

## 2023-05-03 PROCEDURE — 96376 TX/PRO/DX INJ SAME DRUG ADON: CPT

## 2023-05-03 PROCEDURE — 73721 MRI JNT OF LWR EXTRE W/O DYE: CPT

## 2023-05-03 PROCEDURE — 81240 F2 GENE: CPT

## 2023-05-03 PROCEDURE — 82962 GLUCOSE BLOOD TEST: CPT

## 2023-05-03 PROCEDURE — 87521 HEPATITIS C PROBE&RVRS TRNSC: CPT

## 2023-05-03 PROCEDURE — 86900 BLOOD TYPING SEROLOGIC ABO: CPT

## 2023-05-03 PROCEDURE — 73701 CT LOWER EXTREMITY W/DYE: CPT | Mod: MA

## 2023-05-03 PROCEDURE — 84100 ASSAY OF PHOSPHORUS: CPT

## 2023-05-03 PROCEDURE — 80053 COMPREHEN METABOLIC PANEL: CPT

## 2023-05-03 PROCEDURE — 80048 BASIC METABOLIC PNL TOTAL CA: CPT

## 2023-05-03 RX ORDER — IRON SUCROSE 20 MG/ML
400 INJECTION, SOLUTION INTRAVENOUS ONCE
Refills: 0 | Status: DISCONTINUED | OUTPATIENT
Start: 2023-05-03 | End: 2023-05-03

## 2023-05-03 RX ORDER — POLYETHYLENE GLYCOL 3350 17 G/17G
17 POWDER, FOR SOLUTION ORAL
Qty: 2 | Refills: 0
Start: 2023-05-03 | End: 2023-06-01

## 2023-05-03 RX ORDER — FERROUS SULFATE 325(65) MG
1 TABLET ORAL
Qty: 30 | Refills: 0
Start: 2023-05-03 | End: 2023-06-01

## 2023-05-03 RX ORDER — SENNA PLUS 8.6 MG/1
2 TABLET ORAL
Qty: 60 | Refills: 0
Start: 2023-05-03 | End: 2023-06-01

## 2023-05-03 RX ORDER — FERROUS SULFATE 325(65) MG
325 TABLET ORAL DAILY
Refills: 0 | Status: DISCONTINUED | OUTPATIENT
Start: 2023-05-03 | End: 2023-05-03

## 2023-05-03 RX ADMIN — Medication 975 MILLIGRAM(S): at 13:34

## 2023-05-03 RX ADMIN — BUDESONIDE AND FORMOTEROL FUMARATE DIHYDRATE 2 PUFF(S): 160; 4.5 AEROSOL RESPIRATORY (INHALATION) at 06:18

## 2023-05-03 RX ADMIN — OXYCODONE HYDROCHLORIDE 5 MILLIGRAM(S): 5 TABLET ORAL at 09:50

## 2023-05-03 RX ADMIN — POLYETHYLENE GLYCOL 3350 17 GRAM(S): 17 POWDER, FOR SOLUTION ORAL at 06:17

## 2023-05-03 RX ADMIN — OXYCODONE HYDROCHLORIDE 10 MILLIGRAM(S): 5 TABLET ORAL at 12:20

## 2023-05-03 RX ADMIN — Medication 975 MILLIGRAM(S): at 06:18

## 2023-05-03 RX ADMIN — AMLODIPINE BESYLATE 5 MILLIGRAM(S): 2.5 TABLET ORAL at 06:18

## 2023-05-03 RX ADMIN — OXYCODONE HYDROCHLORIDE 10 MILLIGRAM(S): 5 TABLET ORAL at 06:17

## 2023-05-03 RX ADMIN — Medication 325 MILLIGRAM(S): at 08:50

## 2023-05-03 RX ADMIN — TIOTROPIUM BROMIDE 2 PUFF(S): 18 CAPSULE ORAL; RESPIRATORY (INHALATION) at 12:13

## 2023-05-03 RX ADMIN — Medication 975 MILLIGRAM(S): at 14:34

## 2023-05-03 RX ADMIN — RIVAROXABAN 20 MILLIGRAM(S): KIT at 12:13

## 2023-05-03 RX ADMIN — OXYCODONE HYDROCHLORIDE 10 MILLIGRAM(S): 5 TABLET ORAL at 13:20

## 2023-05-03 RX ADMIN — OXYCODONE HYDROCHLORIDE 5 MILLIGRAM(S): 5 TABLET ORAL at 08:50

## 2023-05-03 RX ADMIN — OXYCODONE HYDROCHLORIDE 10 MILLIGRAM(S): 5 TABLET ORAL at 06:50

## 2023-05-03 RX ADMIN — OXYCODONE HYDROCHLORIDE 10 MILLIGRAM(S): 5 TABLET ORAL at 00:00

## 2023-05-03 RX ADMIN — Medication 975 MILLIGRAM(S): at 06:50

## 2023-05-05 DIAGNOSIS — Z86.718 PERSONAL HISTORY OF OTHER VENOUS THROMBOSIS AND EMBOLISM: ICD-10-CM

## 2023-05-05 DIAGNOSIS — M87.151 OSTEONECROSIS DUE TO DRUGS, RIGHT FEMUR: ICD-10-CM

## 2023-05-05 DIAGNOSIS — I10 ESSENTIAL (PRIMARY) HYPERTENSION: ICD-10-CM

## 2023-05-05 DIAGNOSIS — M87.162: ICD-10-CM

## 2023-05-05 DIAGNOSIS — Z79.899 OTHER LONG TERM (CURRENT) DRUG THERAPY: ICD-10-CM

## 2023-05-05 DIAGNOSIS — Z88.8 ALLERGY STATUS TO OTHER DRUGS, MEDICAMENTS AND BIOLOGICAL SUBSTANCES: ICD-10-CM

## 2023-05-05 DIAGNOSIS — J45.909 UNSPECIFIED ASTHMA, UNCOMPLICATED: ICD-10-CM

## 2023-05-05 DIAGNOSIS — Z87.891 PERSONAL HISTORY OF NICOTINE DEPENDENCE: ICD-10-CM

## 2023-05-05 DIAGNOSIS — T38.0X5A ADVERSE EFFECT OF GLUCOCORTICOIDS AND SYNTHETIC ANALOGUES, INITIAL ENCOUNTER: ICD-10-CM

## 2023-05-05 DIAGNOSIS — Z88.0 ALLERGY STATUS TO PENICILLIN: ICD-10-CM

## 2023-05-05 DIAGNOSIS — Z86.19 PERSONAL HISTORY OF OTHER INFECTIOUS AND PARASITIC DISEASES: ICD-10-CM

## 2023-05-05 DIAGNOSIS — E11.51 TYPE 2 DIABETES MELLITUS WITH DIABETIC PERIPHERAL ANGIOPATHY WITHOUT GANGRENE: ICD-10-CM

## 2023-05-05 DIAGNOSIS — Z79.01 LONG TERM (CURRENT) USE OF ANTICOAGULANTS: ICD-10-CM

## 2023-05-05 DIAGNOSIS — I70.222 ATHEROSCLEROSIS OF NATIVE ARTERIES OF EXTREMITIES WITH REST PAIN, LEFT LEG: ICD-10-CM

## 2023-05-05 DIAGNOSIS — M87.9 OSTEONECROSIS, UNSPECIFIED: ICD-10-CM

## 2023-05-05 DIAGNOSIS — Z79.51 LONG TERM (CURRENT) USE OF INHALED STEROIDS: ICD-10-CM

## 2023-05-05 DIAGNOSIS — M87.152 OSTEONECROSIS DUE TO DRUGS, LEFT FEMUR: ICD-10-CM

## 2023-05-05 DIAGNOSIS — D50.9 IRON DEFICIENCY ANEMIA, UNSPECIFIED: ICD-10-CM

## 2023-05-05 DIAGNOSIS — Z79.84 LONG TERM (CURRENT) USE OF ORAL HYPOGLYCEMIC DRUGS: ICD-10-CM

## 2023-05-05 DIAGNOSIS — M87.161: ICD-10-CM

## 2023-05-08 LAB
DNA PLOIDY SPEC FC-IMP: SIGNIFICANT CHANGE UP
PTR INTERPRETATION: SIGNIFICANT CHANGE UP

## 2023-05-12 ENCOUNTER — APPOINTMENT (OUTPATIENT)
Dept: ORTHOPEDIC SURGERY | Facility: CLINIC | Age: 73
End: 2023-05-12
Payer: MEDICARE

## 2023-05-12 VITALS
BODY MASS INDEX: 32.57 KG/M2 | DIASTOLIC BLOOD PRESSURE: 77 MMHG | WEIGHT: 177 LBS | SYSTOLIC BLOOD PRESSURE: 137 MMHG | HEART RATE: 83 BPM | OXYGEN SATURATION: 97 % | HEIGHT: 62 IN

## 2023-05-12 DIAGNOSIS — G89.29 PAIN IN LEFT KNEE: ICD-10-CM

## 2023-05-12 DIAGNOSIS — M25.562 PAIN IN LEFT KNEE: ICD-10-CM

## 2023-05-12 PROCEDURE — 99213 OFFICE O/P EST LOW 20 MIN: CPT

## 2023-05-12 NOTE — PHYSICAL EXAM
[General Appearance - Well-Appearing] : Well appearing [General Appearance - Well Nourished] : well nourished [Oriented To Time, Place, And Person] : Oriented to person, place, and time [Impaired Insight] : Insight and judgment were intact [Affect] : The affect was normal. [Mood] : the mood was normal [Sclera] : the sclera and conjunctiva were normal [Neck Cervical Mass (___cm)] : no neck mass was observed [Heart Rate And Rhythm] : heart rate was normal and rhythm regular [FreeTextEntry1] : Heavy breathing throughout [Abdomen Soft] : Soft [Normal Station and Gait] : gait and station were normal [Tenderness] : tenderness [Swelling] : no swelling [Skin Changes - Describe changes:] : No skin changes noted [Full ROM Unless otherwise noted:] : Full range of motion unless otherwise noted: [LE  Motor Strength Normal unless otherwise noted:] : 5/5 strength in bilateral lower extemities unless otherwise noted. [Normal] : Sensation intact to light touch.

## 2023-05-12 NOTE — HISTORY OF PRESENT ILLNESS
[FreeTextEntry1] : This is a 73-year-old female who comes in complaining of left posterior knee pain.  COVID question whether this was a there was a question of whether this was a clot that had this taking care of and still having pain afterwards.  It is tender to palpation right under the skin.  She had some imaging and was found to have osteonecrosis in bilateral proximal tibias and distal femurs and was told that the reason for her pain is because there was no blood flow to the bone.\par \par She has a history of DVT status post IVC filter and then removal there was question whether her SFA stenosis is the cause of pain.\par \par She does have significant history for asthma such that she was on high-dose steroids for many years.  She has no other joint pain. [Stable] : stable [___ mths] : [unfilled] month(s) ago [5] : currently ~his/her~ pain is 5 out of 10 [None] : No exacerbating factors are noted

## 2023-05-12 NOTE — REVIEW OF SYSTEMS
[Feeling Tired] : not feeling tired [Pain] : no pain [Dyspnea] : dyspnea [SOB on Exertion] : shortness of breath during exertion [Joint Pain] : joint pain [Dizziness] : dizziness [Anxiety] : anxiety

## 2023-05-12 NOTE — DISCUSSION/SUMMARY
[All Questions Answered] : Patient (and family) had all questions answered to an agreeable level of satisfaction [Interested in Proceeding] : Patient (and family) expressed understanding and interest in proceeding with the plan as outlined [de-identified] : I believe the patient's findings of osteonecrosis are related to her previous steroid use.  Furthermore they are bilateral and I doubt that they are related to this pain.  My recommendation is to get an MRI of the thigh as her knee MRI did not cover the area of her pain.  This could be vascular in nature and she is seeing a vascular surgeon however I am ordering an MRI with and without contrast to see if there is any possible lesion such as osteonecrosis related sarcoma versus other lesion.  Follow-up again after MRI scan.\par \par If imaging was ordered, the patient was told to make an appointment to review findings right after all imaging is completed.\par \par We discussed risks, benefits and alternatives. Rationale of care was reviewed and all questions were answered. Patient (and family) had all questions answered to her degree of the level of satisfaction. Patient (and family) expressed understanding and interest in proceeding with the plan as outlined.\par \par \par \par \par This note was done with a voice recognition transcription software and any typos are related to this rather than medical error. Surgical risks reviewed. Patient (and family) had all questions answered to an agreeable level of satisfaction. Patient (and family) expressed understanding and interest in proceeding with the plan as outlined.  \par

## 2023-05-12 NOTE — DATA REVIEWED
[Imaging Present] : Present [de-identified] : X-rays reviewed from April 2023 of bilateral knees show significant signs of osteonecrosis in bilateral distal femur and distal femur and proximal tibia.  There are some subchondral lesions but there are no collapses.  This seems symmetric in both knees.\par \par MRI of the left knee from April 30, 2023 shows:IMPRESSION:\par 1. Extensive subacute on chronic osteonecrosis around the left knee including involvement of the subchondral plates of the distal\par femur and mid patellar apex. No significant subchondral collapse. Small volume reactive joint effusion.\par 2. Nonacute nondisplaced oblique horizontal tibial undersurface tear at the posterior horn/body junction of left medial meniscus.\par Grade 1 subacute on grade 2 chronic sprains overlying MCL and posteromedial corner.\par \par CTA from April 29, 2023 shows:\par IMPRESSION:\par CTA of the left lower extremity performed from the mid thigh to the foot demonstrates severe atherosclerotic disease in the\par visualized left superficial femoral artery with an approximately 2 cm long segment of high-grade stenosis within the mid to distal\par superficial femoral artery. The more distal left lower extremity arterial vasculature including the popliteal, posterior tibial, and\par peroneal arteries demonstrate no significant stenosis.

## 2023-05-15 ENCOUNTER — APPOINTMENT (OUTPATIENT)
Dept: VASCULAR SURGERY | Facility: CLINIC | Age: 73
End: 2023-05-15
Payer: MEDICARE

## 2023-05-15 VITALS
SYSTOLIC BLOOD PRESSURE: 116 MMHG | BODY MASS INDEX: 33.13 KG/M2 | HEIGHT: 62 IN | DIASTOLIC BLOOD PRESSURE: 79 MMHG | HEART RATE: 73 BPM | WEIGHT: 180 LBS

## 2023-05-15 PROCEDURE — 99214 OFFICE O/P EST MOD 30 MIN: CPT

## 2023-05-15 RX ORDER — OXYCODONE AND ACETAMINOPHEN 5; 325 MG/1; MG/1
5-325 TABLET ORAL EVERY 8 HOURS
Qty: 21 | Refills: 0 | Status: DISCONTINUED | COMMUNITY
Start: 2023-04-20 | End: 2023-05-15

## 2023-05-16 NOTE — REVIEW OF SYSTEMS
[As Noted in HPI] : as noted in HPI [Limb Pain] : limb pain [Negative] : Heme/Lymph [Leg Claudication] : no intermittent leg claudication [Lower Ext Edema] : no lower extremity edema [Arthralgias] : arthralgias

## 2023-05-16 NOTE — HISTORY OF PRESENT ILLNESS
[FreeTextEntry1] : 73yoF w/ PMH of DVT(20 yrs ago), s/p IVC filter and IVC filter removal (2/2023 at outside facility), asthma, HTN who was recently admitted via ED w/ L knee pain worsening over the past 2 months, imaging showing bone infarct, admitted for pain control. \par CT LE showed bone infarct in b/l femurs and tibias. CTA LLE with severe atherosclerotic disease in L superficial femoral artery. Vascular was consulted and  felt that the SFA stenosis is unlikely to be cause of pain. Today she returns for a follow up.She continues to experience pain in her both legs at all times. Pain originates at her lower back and hips and radiates down her legs. She had lower back injections that helped temporary, however her symptoms reoccur after some time. Denies claudication, skin changes, previous arterial interventions.\par

## 2023-05-16 NOTE — ASSESSMENT
[Arterial/Venous Disease] : arterial/venous disease [FreeTextEntry1] : 73yoF w/ PMH of DVT(20 yrs ago), s/p IVC filter and IVC filter removal (2/2023 at outside facility), asthma, HTN who was recently admitted via ED w/ L knee pain worsening over the past 2 months, imaging showing bone infarct, admitted for pain control. Vascular was consulted and  felt that the SFA stenosis is unlikely to be cause of pain. Today she returns for a follow up.\par Both legs are well perfused, no skin breakdown, no neurologic deficits.\par We discussed the findings and explained to the patient that no vascular intervention is necessary at this time, since her symptoms are non-vascular.\par She should follow up with her orthopedic doctor.\par F/u here as needed.

## 2023-05-16 NOTE — ADDENDUM
[FreeTextEntry1] : This note was written by Gwen BRANDON, acting as a scribe for Dr. Donnie Hughes.  I, Dr. Donnie Hughes, have read and attest that all the information, medical decision-making, and discharge instructions within are true and accurate.\par \par I, Dr. Donnie Hughse, personally performed the evaluation and management (E/M) services for this new patient.  That E/M includes conducting the initial examination, assessing all conditions, and establishing the plan of care.  Today, my ACP, Gwen BRANDON, was here to observe my evaluation and management services for this patient to be followed going forward.\par \par \par

## 2023-05-16 NOTE — PHYSICAL EXAM
[Respiratory Effort] : normal respiratory effort [Normal Heart Sounds] : normal heart sounds [2+] : left 2+ [Alert] : alert [Calm] : calm [1+] : left 1+ [0] : left 0 [Ankle Swelling (On Exam)] : not present [Varicose Veins Of Lower Extremities] : not present [] : not present [Abdomen Tenderness] : ~T ~M No abdominal tenderness [de-identified] : WN/WD, NAD [de-identified] : NC/AT [de-identified] : supple [de-identified] : +FROM 5/5x4

## 2023-12-04 ENCOUNTER — INPATIENT (INPATIENT)
Facility: HOSPITAL | Age: 73
LOS: 3 days | Discharge: HOME | DRG: 378 | End: 2023-12-08
Attending: STUDENT IN AN ORGANIZED HEALTH CARE EDUCATION/TRAINING PROGRAM | Admitting: INTERNAL MEDICINE
Payer: MEDICARE

## 2023-12-04 VITALS
WEIGHT: 169.98 LBS | RESPIRATION RATE: 18 BRPM | TEMPERATURE: 98 F | DIASTOLIC BLOOD PRESSURE: 89 MMHG | SYSTOLIC BLOOD PRESSURE: 152 MMHG | HEART RATE: 81 BPM | OXYGEN SATURATION: 99 % | HEIGHT: 62 IN

## 2023-12-04 DIAGNOSIS — Z29.9 ENCOUNTER FOR PROPHYLACTIC MEASURES, UNSPECIFIED: ICD-10-CM

## 2023-12-04 DIAGNOSIS — K92.2 GASTROINTESTINAL HEMORRHAGE, UNSPECIFIED: ICD-10-CM

## 2023-12-04 DIAGNOSIS — D64.9 ANEMIA, UNSPECIFIED: ICD-10-CM

## 2023-12-04 DIAGNOSIS — I10 ESSENTIAL (PRIMARY) HYPERTENSION: ICD-10-CM

## 2023-12-04 DIAGNOSIS — E11.9 TYPE 2 DIABETES MELLITUS WITHOUT COMPLICATIONS: ICD-10-CM

## 2023-12-04 DIAGNOSIS — Z86.718 PERSONAL HISTORY OF OTHER VENOUS THROMBOSIS AND EMBOLISM: ICD-10-CM

## 2023-12-04 DIAGNOSIS — D62 ACUTE POSTHEMORRHAGIC ANEMIA: ICD-10-CM

## 2023-12-04 DIAGNOSIS — Z98.891 HISTORY OF UTERINE SCAR FROM PREVIOUS SURGERY: Chronic | ICD-10-CM

## 2023-12-04 LAB
ALBUMIN SERPL ELPH-MCNC: 3.6 G/DL — SIGNIFICANT CHANGE UP (ref 3.3–5)
ALBUMIN SERPL ELPH-MCNC: 3.6 G/DL — SIGNIFICANT CHANGE UP (ref 3.3–5)
ALP SERPL-CCNC: 85 U/L — SIGNIFICANT CHANGE UP (ref 40–120)
ALP SERPL-CCNC: 85 U/L — SIGNIFICANT CHANGE UP (ref 40–120)
ALT FLD-CCNC: 14 U/L — SIGNIFICANT CHANGE UP (ref 10–45)
ALT FLD-CCNC: 14 U/L — SIGNIFICANT CHANGE UP (ref 10–45)
ANION GAP SERPL CALC-SCNC: 8 MMOL/L — SIGNIFICANT CHANGE UP (ref 5–17)
ANION GAP SERPL CALC-SCNC: 8 MMOL/L — SIGNIFICANT CHANGE UP (ref 5–17)
ANISOCYTOSIS BLD QL: SLIGHT — SIGNIFICANT CHANGE UP
ANISOCYTOSIS BLD QL: SLIGHT — SIGNIFICANT CHANGE UP
APTT BLD: 24.9 SEC — SIGNIFICANT CHANGE UP (ref 24.5–35.6)
APTT BLD: 24.9 SEC — SIGNIFICANT CHANGE UP (ref 24.5–35.6)
AST SERPL-CCNC: 20 U/L — SIGNIFICANT CHANGE UP (ref 10–40)
AST SERPL-CCNC: 20 U/L — SIGNIFICANT CHANGE UP (ref 10–40)
BASOPHILS # BLD AUTO: 0.04 K/UL — SIGNIFICANT CHANGE UP (ref 0–0.2)
BASOPHILS # BLD AUTO: 0.04 K/UL — SIGNIFICANT CHANGE UP (ref 0–0.2)
BASOPHILS NFR BLD AUTO: 0.9 % — SIGNIFICANT CHANGE UP (ref 0–2)
BASOPHILS NFR BLD AUTO: 0.9 % — SIGNIFICANT CHANGE UP (ref 0–2)
BILIRUB SERPL-MCNC: <0.2 MG/DL — SIGNIFICANT CHANGE UP (ref 0.2–1.2)
BILIRUB SERPL-MCNC: <0.2 MG/DL — SIGNIFICANT CHANGE UP (ref 0.2–1.2)
BLD GP AB SCN SERPL QL: NEGATIVE — SIGNIFICANT CHANGE UP
BLD GP AB SCN SERPL QL: NEGATIVE — SIGNIFICANT CHANGE UP
BUN SERPL-MCNC: 16 MG/DL — SIGNIFICANT CHANGE UP (ref 7–23)
BUN SERPL-MCNC: 16 MG/DL — SIGNIFICANT CHANGE UP (ref 7–23)
CALCIUM SERPL-MCNC: 8.6 MG/DL — SIGNIFICANT CHANGE UP (ref 8.4–10.5)
CALCIUM SERPL-MCNC: 8.6 MG/DL — SIGNIFICANT CHANGE UP (ref 8.4–10.5)
CHLORIDE SERPL-SCNC: 108 MMOL/L — SIGNIFICANT CHANGE UP (ref 96–108)
CHLORIDE SERPL-SCNC: 108 MMOL/L — SIGNIFICANT CHANGE UP (ref 96–108)
CO2 SERPL-SCNC: 25 MMOL/L — SIGNIFICANT CHANGE UP (ref 22–31)
CO2 SERPL-SCNC: 25 MMOL/L — SIGNIFICANT CHANGE UP (ref 22–31)
CREAT SERPL-MCNC: 0.73 MG/DL — SIGNIFICANT CHANGE UP (ref 0.5–1.3)
CREAT SERPL-MCNC: 0.73 MG/DL — SIGNIFICANT CHANGE UP (ref 0.5–1.3)
DACRYOCYTES BLD QL SMEAR: SLIGHT — SIGNIFICANT CHANGE UP
DACRYOCYTES BLD QL SMEAR: SLIGHT — SIGNIFICANT CHANGE UP
EGFR: 87 ML/MIN/1.73M2 — SIGNIFICANT CHANGE UP
EGFR: 87 ML/MIN/1.73M2 — SIGNIFICANT CHANGE UP
EOSINOPHIL # BLD AUTO: 0.18 K/UL — SIGNIFICANT CHANGE UP (ref 0–0.5)
EOSINOPHIL # BLD AUTO: 0.18 K/UL — SIGNIFICANT CHANGE UP (ref 0–0.5)
EOSINOPHIL NFR BLD AUTO: 3.8 % — SIGNIFICANT CHANGE UP (ref 0–6)
EOSINOPHIL NFR BLD AUTO: 3.8 % — SIGNIFICANT CHANGE UP (ref 0–6)
FERRITIN SERPL-MCNC: 9 NG/ML — LOW (ref 13–330)
FERRITIN SERPL-MCNC: 9 NG/ML — LOW (ref 13–330)
GIANT PLATELETS BLD QL SMEAR: PRESENT — SIGNIFICANT CHANGE UP
GIANT PLATELETS BLD QL SMEAR: PRESENT — SIGNIFICANT CHANGE UP
GLUCOSE SERPL-MCNC: 125 MG/DL — HIGH (ref 70–99)
GLUCOSE SERPL-MCNC: 125 MG/DL — HIGH (ref 70–99)
HAPTOGLOB SERPL-MCNC: 104 MG/DL — SIGNIFICANT CHANGE UP (ref 34–200)
HAPTOGLOB SERPL-MCNC: 104 MG/DL — SIGNIFICANT CHANGE UP (ref 34–200)
HCT VFR BLD CALC: 20.1 % — CRITICAL LOW (ref 34.5–45)
HCT VFR BLD CALC: 20.1 % — CRITICAL LOW (ref 34.5–45)
HGB BLD-MCNC: 6.2 G/DL — CRITICAL LOW (ref 11.5–15.5)
HGB BLD-MCNC: 6.2 G/DL — CRITICAL LOW (ref 11.5–15.5)
HYPOCHROMIA BLD QL: SIGNIFICANT CHANGE UP
HYPOCHROMIA BLD QL: SIGNIFICANT CHANGE UP
INR BLD: 1.33 — HIGH (ref 0.85–1.18)
INR BLD: 1.33 — HIGH (ref 0.85–1.18)
IRON SATN MFR SERPL: 17 UG/DL — LOW (ref 30–160)
IRON SATN MFR SERPL: 17 UG/DL — LOW (ref 30–160)
IRON SATN MFR SERPL: 4 % — LOW (ref 14–50)
IRON SATN MFR SERPL: 4 % — LOW (ref 14–50)
LYMPHOCYTES # BLD AUTO: 0.92 K/UL — LOW (ref 1–3.3)
LYMPHOCYTES # BLD AUTO: 0.92 K/UL — LOW (ref 1–3.3)
LYMPHOCYTES # BLD AUTO: 19.8 % — SIGNIFICANT CHANGE UP (ref 13–44)
LYMPHOCYTES # BLD AUTO: 19.8 % — SIGNIFICANT CHANGE UP (ref 13–44)
MAGNESIUM SERPL-MCNC: 1.9 MG/DL — SIGNIFICANT CHANGE UP (ref 1.6–2.6)
MAGNESIUM SERPL-MCNC: 1.9 MG/DL — SIGNIFICANT CHANGE UP (ref 1.6–2.6)
MANUAL SMEAR VERIFICATION: SIGNIFICANT CHANGE UP
MANUAL SMEAR VERIFICATION: SIGNIFICANT CHANGE UP
MCHC RBC-ENTMCNC: 22.2 PG — LOW (ref 27–34)
MCHC RBC-ENTMCNC: 22.2 PG — LOW (ref 27–34)
MCHC RBC-ENTMCNC: 30.8 GM/DL — LOW (ref 32–36)
MCHC RBC-ENTMCNC: 30.8 GM/DL — LOW (ref 32–36)
MCV RBC AUTO: 72 FL — LOW (ref 80–100)
MCV RBC AUTO: 72 FL — LOW (ref 80–100)
MICROCYTES BLD QL: SLIGHT — SIGNIFICANT CHANGE UP
MICROCYTES BLD QL: SLIGHT — SIGNIFICANT CHANGE UP
MONOCYTES # BLD AUTO: 0.26 K/UL — SIGNIFICANT CHANGE UP (ref 0–0.9)
MONOCYTES # BLD AUTO: 0.26 K/UL — SIGNIFICANT CHANGE UP (ref 0–0.9)
MONOCYTES NFR BLD AUTO: 5.7 % — SIGNIFICANT CHANGE UP (ref 2–14)
MONOCYTES NFR BLD AUTO: 5.7 % — SIGNIFICANT CHANGE UP (ref 2–14)
NEUTROPHILS # BLD AUTO: 3.23 K/UL — SIGNIFICANT CHANGE UP (ref 1.8–7.4)
NEUTROPHILS # BLD AUTO: 3.23 K/UL — SIGNIFICANT CHANGE UP (ref 1.8–7.4)
NEUTROPHILS NFR BLD AUTO: 68.9 % — SIGNIFICANT CHANGE UP (ref 43–77)
NEUTROPHILS NFR BLD AUTO: 68.9 % — SIGNIFICANT CHANGE UP (ref 43–77)
NEUTS BAND # BLD: 0.9 % — SIGNIFICANT CHANGE UP (ref 0–8)
NEUTS BAND # BLD: 0.9 % — SIGNIFICANT CHANGE UP (ref 0–8)
OVALOCYTES BLD QL SMEAR: SIGNIFICANT CHANGE UP
OVALOCYTES BLD QL SMEAR: SIGNIFICANT CHANGE UP
PHOSPHATE SERPL-MCNC: 2.5 MG/DL — SIGNIFICANT CHANGE UP (ref 2.5–4.5)
PHOSPHATE SERPL-MCNC: 2.5 MG/DL — SIGNIFICANT CHANGE UP (ref 2.5–4.5)
PLAT MORPH BLD: ABNORMAL
PLAT MORPH BLD: ABNORMAL
PLATELET # BLD AUTO: 178 K/UL — SIGNIFICANT CHANGE UP (ref 150–400)
PLATELET # BLD AUTO: 178 K/UL — SIGNIFICANT CHANGE UP (ref 150–400)
POIKILOCYTOSIS BLD QL AUTO: SIGNIFICANT CHANGE UP
POIKILOCYTOSIS BLD QL AUTO: SIGNIFICANT CHANGE UP
POLYCHROMASIA BLD QL SMEAR: SLIGHT — SIGNIFICANT CHANGE UP
POLYCHROMASIA BLD QL SMEAR: SLIGHT — SIGNIFICANT CHANGE UP
POTASSIUM SERPL-MCNC: 4.2 MMOL/L — SIGNIFICANT CHANGE UP (ref 3.5–5.3)
POTASSIUM SERPL-MCNC: 4.2 MMOL/L — SIGNIFICANT CHANGE UP (ref 3.5–5.3)
POTASSIUM SERPL-SCNC: 4.2 MMOL/L — SIGNIFICANT CHANGE UP (ref 3.5–5.3)
POTASSIUM SERPL-SCNC: 4.2 MMOL/L — SIGNIFICANT CHANGE UP (ref 3.5–5.3)
PROT SERPL-MCNC: 6.9 G/DL — SIGNIFICANT CHANGE UP (ref 6–8.3)
PROT SERPL-MCNC: 6.9 G/DL — SIGNIFICANT CHANGE UP (ref 6–8.3)
PROTHROM AB SERPL-ACNC: 15 SEC — HIGH (ref 9.5–13)
PROTHROM AB SERPL-ACNC: 15 SEC — HIGH (ref 9.5–13)
RBC # BLD: 2.79 M/UL — LOW (ref 3.8–5.2)
RBC # FLD: 18.8 % — HIGH (ref 10.3–14.5)
RBC # FLD: 18.8 % — HIGH (ref 10.3–14.5)
RBC BLD AUTO: ABNORMAL
RBC BLD AUTO: ABNORMAL
RETICS #: 69.5 K/UL — SIGNIFICANT CHANGE UP (ref 25–125)
RETICS #: 69.5 K/UL — SIGNIFICANT CHANGE UP (ref 25–125)
RETICS/RBC NFR: 2.5 % — SIGNIFICANT CHANGE UP (ref 0.5–2.5)
RETICS/RBC NFR: 2.5 % — SIGNIFICANT CHANGE UP (ref 0.5–2.5)
RH IG SCN BLD-IMP: POSITIVE — SIGNIFICANT CHANGE UP
RH IG SCN BLD-IMP: POSITIVE — SIGNIFICANT CHANGE UP
SMUDGE CELLS # BLD: PRESENT — SIGNIFICANT CHANGE UP
SMUDGE CELLS # BLD: PRESENT — SIGNIFICANT CHANGE UP
SODIUM SERPL-SCNC: 141 MMOL/L — SIGNIFICANT CHANGE UP (ref 135–145)
SODIUM SERPL-SCNC: 141 MMOL/L — SIGNIFICANT CHANGE UP (ref 135–145)
SPHEROCYTES BLD QL SMEAR: SLIGHT — SIGNIFICANT CHANGE UP
SPHEROCYTES BLD QL SMEAR: SLIGHT — SIGNIFICANT CHANGE UP
TIBC SERPL-MCNC: 397 UG/DL — SIGNIFICANT CHANGE UP (ref 220–430)
TIBC SERPL-MCNC: 397 UG/DL — SIGNIFICANT CHANGE UP (ref 220–430)
UIBC SERPL-MCNC: 380 UG/DL — HIGH (ref 110–370)
UIBC SERPL-MCNC: 380 UG/DL — HIGH (ref 110–370)
WBC # BLD: 4.63 K/UL — SIGNIFICANT CHANGE UP (ref 3.8–10.5)
WBC # BLD: 4.63 K/UL — SIGNIFICANT CHANGE UP (ref 3.8–10.5)
WBC # FLD AUTO: 4.63 K/UL — SIGNIFICANT CHANGE UP (ref 3.8–10.5)
WBC # FLD AUTO: 4.63 K/UL — SIGNIFICANT CHANGE UP (ref 3.8–10.5)

## 2023-12-04 PROCEDURE — 93010 ELECTROCARDIOGRAM REPORT: CPT

## 2023-12-04 PROCEDURE — 99223 1ST HOSP IP/OBS HIGH 75: CPT | Mod: GC

## 2023-12-04 PROCEDURE — 99222 1ST HOSP IP/OBS MODERATE 55: CPT | Mod: GC

## 2023-12-04 PROCEDURE — 71045 X-RAY EXAM CHEST 1 VIEW: CPT | Mod: 26

## 2023-12-04 PROCEDURE — 99285 EMERGENCY DEPT VISIT HI MDM: CPT

## 2023-12-04 RX ORDER — IRON SUCROSE 20 MG/ML
200 INJECTION, SOLUTION INTRAVENOUS EVERY 24 HOURS
Refills: 0 | Status: COMPLETED | OUTPATIENT
Start: 2023-12-04 | End: 2023-12-06

## 2023-12-04 RX ORDER — AMLODIPINE BESYLATE 2.5 MG/1
1 TABLET ORAL
Refills: 0 | DISCHARGE

## 2023-12-04 RX ORDER — METFORMIN HYDROCHLORIDE 850 MG/1
1 TABLET ORAL
Refills: 0 | DISCHARGE

## 2023-12-04 RX ORDER — PANTOPRAZOLE SODIUM 20 MG/1
80 TABLET, DELAYED RELEASE ORAL ONCE
Refills: 0 | Status: COMPLETED | OUTPATIENT
Start: 2023-12-04 | End: 2023-12-04

## 2023-12-04 RX ORDER — PANTOPRAZOLE SODIUM 20 MG/1
40 TABLET, DELAYED RELEASE ORAL EVERY 12 HOURS
Refills: 0 | Status: DISCONTINUED | OUTPATIENT
Start: 2023-12-04 | End: 2023-12-07

## 2023-12-04 RX ORDER — INFLUENZA VIRUS VACCINE 15; 15; 15; 15 UG/.5ML; UG/.5ML; UG/.5ML; UG/.5ML
0.7 SUSPENSION INTRAMUSCULAR ONCE
Refills: 0 | Status: DISCONTINUED | OUTPATIENT
Start: 2023-12-04 | End: 2023-12-08

## 2023-12-04 RX ORDER — AMLODIPINE BESYLATE 2.5 MG/1
10 TABLET ORAL EVERY 24 HOURS
Refills: 0 | Status: DISCONTINUED | OUTPATIENT
Start: 2023-12-04 | End: 2023-12-08

## 2023-12-04 RX ORDER — TIOTROPIUM BROMIDE 18 UG/1
2 CAPSULE ORAL; RESPIRATORY (INHALATION) DAILY
Refills: 0 | Status: DISCONTINUED | OUTPATIENT
Start: 2023-12-04 | End: 2023-12-08

## 2023-12-04 RX ORDER — BUDESONIDE AND FORMOTEROL FUMARATE DIHYDRATE 160; 4.5 UG/1; UG/1
2 AEROSOL RESPIRATORY (INHALATION)
Refills: 0 | Status: DISCONTINUED | OUTPATIENT
Start: 2023-12-04 | End: 2023-12-08

## 2023-12-04 RX ADMIN — AMLODIPINE BESYLATE 10 MILLIGRAM(S): 2.5 TABLET ORAL at 22:26

## 2023-12-04 RX ADMIN — PANTOPRAZOLE SODIUM 80 MILLIGRAM(S): 20 TABLET, DELAYED RELEASE ORAL at 13:42

## 2023-12-04 NOTE — H&P ADULT - HIV OFFER
HPI


Chief Complaint:  Injury


Time Seen by Provider:  14:33


Travel History


International Travel<30 days:  No


Contact w/Intl Traveler<30days:  No


Traveled to known affect area:  No





History of Present Illness


HPI


61-year-old female presents to the emergency department with pain to her elbows

, left knee, right ankle, right shoulder and left wrist.  After a trip and fall 

that occurred Thursday.  States she was walking down a step and missed a step 

and fell landing on her elbows and left knee.  Patient denies head trauma, 

headache, blurred vision, neck pain, back pain.  States she has some left wrist 

pain, bilateral elbows, right ankle, left knee pain, right shoulder pain.  Pain 

increases with range of motion.  Patient denies crepitus or deformities.  She 

presents today because she did not have the vehicle and she had to take a cab 

to be to the hospital.  Patient is most concerned about her right ankle which 

she is having trouble walking.  Patient denies numbness tingling of the 

extremities.





PFSH


Past Medical History


Cardiovascular Problems:  Yes





Social History


Tobacco Use:  No





Allergies-Medications


(Allergen,Severity, Reaction):  


Coded Allergies:  


     morphine (Verified  Allergy, Severe, Hives, 11/18/17)


Reported Meds & Prescriptions





Reported Meds & Active Scripts


Active


Reported


Celexa (Citalopram Hydrobromide) 20 Mg Tab 30 Mg PO DAILY


Alprazolam 1 Mg Tab 1 Mg PO TID


Estradiol Unknown Strength Tab Unknown Dose PO DAILY


Protonix (Pantoprazole Sodium) 20 Mg Tab 20 Mg PO DAILY


Fentanyl Patch 72 HR (Fentanyl) 25 Mcg/Hr Patch 25 Mcg T-DERMAL Q72H


Oxycodone (Oxycodone HCl) 5 Mg Cap 5 Mg PO TID PRN








Review of Systems


Except as stated in HPI:  all other systems reviewed are Neg





Physical Exam


Narrative


GENERAL: Well-nourished, well-developed patient.


SKIN: Focused skin assessment warm/dry.


HEAD: Normocephalic.


EYES: No scleral icterus. No injection or drainage. 


NECK: Supple, trachea midline. No JVD or lymphadenopathy.


CARDIOVASCULAR: Regular rate and rhythm without murmurs, gallops, or rubs. 


RESPIRATORY: Breath sounds equal bilaterally. No accessory muscle use.


GASTROINTESTINAL: Abdomen soft, non-tender, nondistended. 


MUSCULOSKELETAL: No cyanosis, or edema.


Left elbow- abrasion over the dorsal aspect of the olecranon process area.  No 

deformities or ecchymosis noted, mild TTP over the medial epicondyle.  

Neurovascularly intact


Right elbow- abrasion over dorsal aspect, no deformities or ecchymosis.  Mild 

TTP over olecranon process.  Neurovascularly intact


Right ankle- edematous, slight ecchymosis, TTP over ankle joints, pain with 

inversion and eversion.  Neurovascular intact


Right shoulder- TTP over before meals joint, no ecchymosis or tenting.  No 

obvious deformities.  Neurovascularly intact


Left wrist- TTP over the palmar aspect of wrist, no deformities, slight abrasion

, mild ecchymosis.  Neurovascular intact


BACK: Nontender without obvious deformity. No CVA tenderness.





Data


Data


Last Documented VS





Vital Signs








  Date Time  Temp Pulse Resp B/P (MAP) Pulse Ox O2 Delivery O2 Flow Rate FiO2


 


11/18/17 16:57        


 


11/18/17 14:34   16  98 Room Air  


 


11/18/17 14:12 98.3 89      








Orders





 Orders


Shoulder, Complete (>2vws) (11/18/17 )


Elbow, Complete (4 Vws) (11/18/17 )


Elbow, Complete (4 Vws) (11/18/17 )


Knee, Complete (4vws) (11/18/17 )


Wrist, Complete ([id number]) (11/18/17 )


Ankle, Complete ([id number]) (11/18/17 )


Splint Or Brace Apply/Monitor (11/18/17 16:41)


Ed Discharge Order (11/18/17 16:41)


Brace Ankle Stirrup (11/18/17 )








MDM


Medical Decision Making


Medical Screen Exam Complete:  Yes


Emergency Medical Condition:  Yes


Differential Diagnosis


Left Wrist contusion versus sprain versus fracture


Bilateral Elbow contusion versus sprain versus fracture


Right shoulder contusion versus srain versus fracture


Right ankle contusion versus sprain versus fracture


Narrative Course


61-year-old female presents to the emergency department with pain to her elbows

, left knee, right ankle, right shoulder and left wrist.  After a trip and fall 

that occurred Thursday.  States she was walking down a step and missed a step 

and fell landing on her elbows and left knee.  Patient denies head trauma, 

headache, blurred vision, neck pain, back pain.  States she has some left wrist 

pain, bilateral elbows, right ankle, left knee pain, right shoulder pain.  Pain 

increases with range of motion.  Patient denies crepitus or deformities.  She 

presents today because she did not have the vehicle and she had to take a cab 

to be to the hospital.  Patient is most concerned about her right ankle which 

she is having trouble walking. Patient denies numbness tingling of the 

extremities.  Of note patient uses fentanyl patches for chronic back pain and 

fibromyalgia.


Vital signs stable


Physical exam- left wrist ecchymosis with abrasion, right lateral elbow 

abrasions, right ankle edematous


Imaging studies- no acute process


Advised patient to follow up with her primary care physician within 2 days.


Ibuprofen per package instructions for pain relief.


Return to the emergency department for persistent or worsening symptoms.





Diagnosis





 Primary Impression:  


 Wrist contusion


 Qualified Codes:  S60.212A - Contusion of left wrist, initial encounter


 Additional Impressions:  


 Contusion of knee, left


 Qualified Codes:  S80.02XA - Contusion of left knee, initial encounter


 Contusion of right shoulder


 Qualified Codes:  S40.011A - Contusion of right shoulder, initial encounter


 Elbow contusion


 Qualified Codes:  S50.00XA - Contusion of unspecified elbow, initial encounter


 Ankle sprain


 Qualified Codes:  S93.401A - Sprain of unspecified ligament of right ankle, 

initial encounter


Referrals:  


Primary Care Physician





***Additional Instructions:  


Follow-up with her primary care physician within 2 days


Disposition:  01 DISCHARGE HOME


Condition:  Stable











Ankita Cabrera Nov 18, 2017 16:41
Opt out

## 2023-12-04 NOTE — ED ADULT TRIAGE NOTE - ARRIVAL INFO ADDITIONAL COMMENTS
pt c/o BLACK STOOL "since thanksgiving", weakness, and shortness of breath X 2 days. c/o abd pain and constipation. last BM 2 days ago.

## 2023-12-04 NOTE — H&P ADULT - PROBLEM SELECTOR PLAN 1
Pt presenting after 10 day history of dark stool and 2 day history of mild dyspnea on exertion with fatigue. Reports hx of blood transfusions in the past, last 2 years ago. Also reports hx of hematemsis 2/2 NSAID use in 2016, requiring "emergent surgery" and blood transfusions. Per pt, no recent NSAID use, significant alcohol use  -during previous admissions, baseline Hgb of 8-9, now presenting with Hgb of 6.2  -1u pRBC ordered, f/u post transfusion CBC (goal Hgb >7)  -maintain active T&S, 2 large bore IVs  -GI consulted  -NPO   -IV PPI BID Pt presenting after 10 day history of dark stool and 2 day history of mild dyspnea on exertion with fatigue. Reports hx of blood transfusions in the past, last 2 years ago. 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 Also reports hx of hematemsis 2/2 NSAID use in 2016, requiring "emergent surgery" and blood transfusions. Per pt, no recent NSAID use, significant alcohol use  -during previous admissions, baseline Hgb of 8-9, now presenting with Hgb of 6.2  -1u pRBC ordered, f/u post transfusion CBC (goal Hgb >7)  -maintain active T&S, 2 large bore IVs  -f/u iron studies  -GI consulted, f/u recommendations  -NPO   -IV PPI BID Pt presenting after 10 day history of dark stool and 2 day history of mild dyspnea on exertion with fatigue. Reports hx of blood transfusions in the past, last 2 years ago. 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 Also reports hx of hematemesis 2/2 NSAID use in 2016, requiring "emergent surgery" and blood transfusions. Per pt, no recent NSAID use, significant alcohol use  -during previous admissions, baseline Hgb of 8-9, now presenting with Hgb of 6.2  -1u pRBC ordered, f/u post transfusion CBC (goal Hgb >7)  -maintain active T&S, 2 large bore IVs  -f/u iron studies  -GI consulted, f/u recommendations

## 2023-12-04 NOTE — ED PROVIDER NOTE - PHYSICAL EXAMINATION
ED tech chaperone: no fecal impaction. no stool obtained on JACOB. No black stool or BRBPR.   no LE edema  pale conjunctiva

## 2023-12-04 NOTE — H&P ADULT - NS ATTEST RISK PROBLEM GEN_ALL_CORE FT
Upper GI bleeding   Blood transfusion and Monitoring   risk for hemodynamic compromise   review of old records  review of labs   decision making to admit the patient

## 2023-12-04 NOTE — H&P ADULT - ATTENDING COMMENTS
74 yo woman on xarelto for dvt , prior hx of STARR admitted to the hospital with melena x 10 days , 2 day hx of shortness of breath and fatigue     gi planning to perform EGD in am   Protonix IV BID , npo after midnight    1 unit prbc

## 2023-12-04 NOTE — CONSULT NOTE ADULT - ASSESSMENT
73F h/o PAD, prior DVT (on rivaroxaban), STARR p/w fatigue and SOB x 2d in setting of melena x 10d, found with iron deficiency anemia.    Pt presenting with melena and STARR most suspicious for UGIB. She is hemodynamically stable with no hx thus far to suggest varices or other rapid UGIB.    Recommendations:  - Large-bore IV x 2, active T&S, and transfuse if Hgb < 7  - IV PPI BID  - Hold AC if medically safe  - Please give IV iron  - Please make NPO except meds at midnight for EGD tomorrow    We will continue to follow. Please see attending addendum for final recommendations.     Bob (Jaja) MD Chris  Gastroenterology Fellow  Pager (M-F 7a-5p): [phone]  Pager (after hours): Please call  for on-call fellow   73F h/o PAD, prior DVT (on rivaroxaban), STARR p/w fatigue and SOB x 2d in setting of melena x 10d, found with iron deficiency anemia.    Pt presenting with melena and STARR most suspicious for UGIB. She is hemodynamically stable with no hx thus far to suggest varices or other rapid UGIB.    Recommendations:  - Large-bore IV x 2, active T&S, and transfuse if Hgb < 7  - IV PPI BID  - Hold AC if medically safe  - Please give IV iron  - Please make NPO except meds at midnight for EGD tomorrow    We will continue to follow. Please see attending addendum for final recommendations.     Bob (Jaja) MD Chris  Gastroenterology Fellow  Pager (M-F 7a-5p): [phone]  Pager (after hours): Please call  for on-call fellow

## 2023-12-04 NOTE — CONSULT NOTE ADULT - SUBJECTIVE AND OBJECTIVE BOX
HPI:  73F h/o PAD, prior DVT (on rivaroxaban), STARR p/w fatigue and SOB x 2d in setting of melena x 10d, found with iron deficiency anemia.    Pt reported black stools for the past 10 days which she initially attributed to a change in her lisinopril since she previously developed black stools with iron supplements (currently not taking). Denied hematemesis or hematochezia and does not recall NSAID use. Says she perhaps had EGD and colonoscopy several years ago but does not remember when.     Of note, pt reports in 2016 she had an episode of hematemesis 2/2 NSAID use. She was hospitalized at [facility] and required transfusions at that time and "emergent surgery" but is unsure what the surgery was. Since then, pt was instructed not to take NSAIDs. Takes iron pills on and off as instructed by her outpatient doctors depending on her iron levels.      Allergies  penicillin (Unknown)  ibuprofen (Unknown)    Intolerances      Home Medications:  Albuterol (Eqv-ProAir HFA) 90 mcg/inh inhalation aerosol: 2 inhaled every 6 hours as needed for  shortness of breath and/or wheezing (2023 19:06)  amLODIPine 10 mg oral tablet: 1 tab(s) orally once a day (04 Dec 2023 15:53)  lisinopril 40 mg oral tablet: 1 orally once a day (2023 19:02)  metFORMIN 500 mg oral tablet: 1 tab(s) orally once a day (04 Dec 2023 15:53)  Trelegy Ellipta 100 mcg-62.5 mcg-25 mcg/inh inhalation powder: 1 inhaled once a day (2023 19:07)  Xarelto 20 mg oral tablet: 1 orally once a day (2023 19:01)    MEDICATIONS:  MEDICATIONS  (STANDING):  amLODIPine   Tablet 10 milliGRAM(s) Oral every 24 hours  budesonide  80 MICROgram(s)/formoterol 4.5 MICROgram(s) Inhaler 2 Puff(s) Inhalation two times a day  iron sucrose IVPB 200 milliGRAM(s) IV Intermittent every 24 hours  pantoprazole  Injectable 40 milliGRAM(s) IV Push every 12 hours  tiotropium 2.5 MICROgram(s) Inhaler 2 Puff(s) Inhalation daily    MEDICATIONS  (PRN):    PAST MEDICAL & SURGICAL HISTORY:  DVT, lower extremity      Asthma      PAD (peripheral artery disease)      HTN (hypertension)      H/O  section          Vital Signs Last 24 Hrs  T(C): 36.5 (04 Dec 2023 11:55), Max: 36.5 (04 Dec 2023 11:55)  T(F): 97.7 (04 Dec 2023 11:55), Max: 97.7 (04 Dec 2023 11:55)  HR: 81 (04 Dec 2023 11:55) (81 - 81)  BP: 152/89 (04 Dec 2023 11:55) (152/89 - 152/89)  BP(mean): --  RR: 18 (04 Dec 2023 11:55) (18 - 18)  SpO2: 99% (04 Dec 2023 11:55) (99% - 99%)    Parameters below as of 04 Dec 2023 11:55  Patient On (Oxygen Delivery Method): room air          PHYSICAL EXAM:  General: Alert, no acute distress  Lungs: Normal respiratory effort  Abdomen: Nondistended, soft, nontender  Extremities: No edema  Neurological: Moving all extremities spontaneously    LABS:                        6.2    4.63  )-----------( 178      ( 04 Dec 2023 13:26 )             20.1     12-04    141  |  108  |  16  ----------------------------<  125<H>  4.2   |  25  |  0.73    Ca    8.6      04 Dec 2023 13:26  Phos  2.5     12-04  Mg     1.9     12-04    TPro  6.9  /  Alb  3.6  /  TBili  <0.2  /  DBili  x   /  AST  20  /  ALT  14  /  AlkPhos  85  12-04    PT/INR - ( 04 Dec 2023 13:26 )   PT: 15.0 sec;   INR: 1.33          PTT - ( 04 Dec 2023 13:26 )  PTT:24.9 sec    RADIOLOGY & ADDITIONAL STUDIES:  Reviewed. HPI:  73F h/o PAD, prior DVT (on rivaroxaban), STARR p/w fatigue and SOB x 2d in setting of melena x 10d, found with iron deficiency anemia.    Pt reported black stools for the past 10 days which she initially attributed to a change in her lisinopril since she previously developed black stools with iron supplements (currently not taking). Denied hematemesis or hematochezia and does not recall NSAID use. Says she perhaps had EGD and colonoscopy several years ago but does not remember when.     Of note, pt reports in 2016 she had an episode of hematemesis 2/2 NSAID use. She was hospitalized at [facility] and required transfusions at that time and "emergent surgery" but is unsure what the surgery was. Since then, pt was instructed not to take NSAIDs. Takes iron pills on and off as instructed by her outpatient doctors depending on her iron levels.      Allergies  penicillin (Unknown)  ibuprofen (Unknown)    Intolerances      Home Medications:  Albuterol (Eqv-ProAir HFA) 90 mcg/inh inhalation aerosol: 2 inhaled every 6 hours as needed for  shortness of breath and/or wheezing (2023 19:06)  amLODIPine 10 mg oral tablet: 1 tab(s) orally once a day (04 Dec 2023 15:53)  lisinopril 40 mg oral tablet: 1 orally once a day (2023 19:02)  metFORMIN 500 mg oral tablet: 1 tab(s) orally once a day (04 Dec 2023 15:53)  Trelegy Ellipta 100 mcg-62.5 mcg-25 mcg/inh inhalation powder: 1 inhaled once a day (2023 19:07)  Xarelto 20 mg oral tablet: 1 orally once a day (2023 19:01)    MEDICATIONS:  MEDICATIONS  (STANDING):  amLODIPine   Tablet 10 milliGRAM(s) Oral every 24 hours  budesonide  80 MICROgram(s)/formoterol 4.5 MICROgram(s) Inhaler 2 Puff(s) Inhalation two times a day  iron sucrose IVPB 200 milliGRAM(s) IV Intermittent every 24 hours  pantoprazole  Injectable 40 milliGRAM(s) IV Push every 12 hours  tiotropium 2.5 MICROgram(s) Inhaler 2 Puff(s) Inhalation daily    MEDICATIONS  (PRN):    PAST MEDICAL & SURGICAL HISTORY:  DVT, lower extremity      Asthma      PAD (peripheral artery disease)      HTN (hypertension)      H/O  section          Vital Signs Last 24 Hrs  T(C): 36.5 (04 Dec 2023 11:55), Max: 36.5 (04 Dec 2023 11:55)  T(F): 97.7 (04 Dec 2023 11:55), Max: 97.7 (04 Dec 2023 11:55)  HR: 81 (04 Dec 2023 11:55) (81 - 81)  BP: 152/89 (04 Dec 2023 11:55) (152/89 - 152/89)  BP(mean): --  RR: 18 (04 Dec 2023 11:55) (18 - 18)  SpO2: 99% (04 Dec 2023 11:55) (99% - 99%)    Parameters below as of 04 Dec 2023 11:55  Patient On (Oxygen Delivery Method): room air          PHYSICAL EXAM:  General: Alert, no acute distress  Lungs: Normal respiratory effort  Abdomen: Nondistended, soft, nontender  Extremities: No edema  Neurological: Moving all extremities spontaneously    LABS:                        6.2    4.63  )-----------( 178      ( 04 Dec 2023 13:26 )             20.1     12-04    141  |  108  |  16  ----------------------------<  125<H>  4.2   |  25  |  0.73    Ca    8.6      04 Dec 2023 13:26  Phos  2.5     12-04  Mg     1.9     12-04    TPro  6.9  /  Alb  3.6  /  TBili  <0.2  /  DBili  x   /  AST  20  /  ALT  14  /  AlkPhos  85  12-04    PT/INR - ( 04 Dec 2023 13:26 )   PT: 15.0 sec;   INR: 1.33          PTT - ( 04 Dec 2023 13:26 )  PTT:24.9 sec    RADIOLOGY & ADDITIONAL STUDIES:  Reviewed.

## 2023-12-04 NOTE — H&P ADULT - NSHPPHYSICALEXAM_GEN_ALL_CORE
.  VITAL SIGNS:  T(C): 36.5 (12-04-23 @ 11:55), Max: 36.5 (12-04-23 @ 11:55)  T(F): 97.7 (12-04-23 @ 11:55), Max: 97.7 (12-04-23 @ 11:55)  HR: 81 (12-04-23 @ 11:55) (81 - 81)  BP: 152/89 (12-04-23 @ 11:55) (152/89 - 152/89)  BP(mean): --  RR: 18 (12-04-23 @ 11:55) (18 - 18)  SpO2: 99% (12-04-23 @ 11:55) (99% - 99%)  Wt(kg): --    PHYSICAL EXAM:    Constitutional: WDWN resting comfortably in bed; NAD  Head: NC/AT  Eyes: PERRL, EOMI, anicteric sclera  ENT: no nasal discharge; MMM  Neck: supple  Respiratory: CTA B/L; no W/R/R  Cardiac: +S1/S2; RRR; no M/R/G  Gastrointestinal: abdomen soft, NT/ND; no rebound or guarding; +BSx4  Extremities: WWP, no peripheral edema  Vascular: 2+ radial, DP/PT pulses B/L  Dermatologic: skin warm, dry and intact; no rashes, wounds, or scars  Neurologic: AAOx3; CNII-XII grossly intact; no focal deficits  Psychiatric: affect and characteristics of appearance, verbalizations, behaviors are appropriate .  VITAL SIGNS:  T(C): 36.5 (12-04-23 @ 11:55), Max: 36.5 (12-04-23 @ 11:55)  T(F): 97.7 (12-04-23 @ 11:55), Max: 97.7 (12-04-23 @ 11:55)  HR: 81 (12-04-23 @ 11:55) (81 - 81)  BP: 152/89 (12-04-23 @ 11:55) (152/89 - 152/89)  BP(mean): --  RR: 18 (12-04-23 @ 11:55) (18 - 18)  SpO2: 99% (12-04-23 @ 11:55) (99% - 99%)  Wt(kg): --    PHYSICAL EXAM:    Constitutional: WDWN resting comfortably in bed; NAD  Head: NC/AT  Eyes: PERRL, EOMI, anicteric sclera  ENT: no nasal discharge; MMM  Neck: supple  Respiratory: CTA B/L; no W/R/R  Cardiac: +S1/S2; RRR; no M/R/G  Gastrointestinal: abdomen soft, NT/ND; no rebound or guarding; +BSx4  Rectal: Per ED provider, no stool on exam  Extremities: WWP, no peripheral edema  Vascular: 2+ radial, DP/PT pulses B/L  Dermatologic: skin warm, dry and intact; no rashes, wounds, or scars  Neurologic: AAOx3; CNII-XII grossly intact; no focal deficits  Psychiatric: affect and characteristics of appearance, verbalizations, behaviors are appropriate

## 2023-12-04 NOTE — H&P ADULT - NSHPREVIEWOFSYSTEMS_GEN_ALL_CORE
REVIEW OF SYSTEMS:    CONSTITUTIONAL: No fevers or chills  EYES/ENT: No visual changes;  No vertigo or throat pain   NECK: No pain or stiffness  RESPIRATORY: No cough, wheezing, hemoptysis; +mild dyspnea on exertion  CARDIOVASCULAR: No chest pain or palpitations  GASTROINTESTINAL: No abdominal or epigastric pain. No nausea, vomiting, or hematemesis; No diarrhea or constipation. + melena, No hematochezia.  GENITOURINARY: No dysuria, frequency or hematuria  NEUROLOGICAL: No numbness or weakness  SKIN: No itching, rashes

## 2023-12-04 NOTE — H&P ADULT - PROBLEM SELECTOR PLAN 5
F: None  E: replete K>4 Mg>2  N: NPO  D: SCDs (concern for bleed)  Dispo: F pt reports hx of DVT many years ago, previously with IVC filter which has now been removed. Reports compliance with Xarelto  -last dose of Xarelto on 12/3  will hold at this time given concern for UGIB

## 2023-12-04 NOTE — ED ADULT NURSE NOTE - NSFALLUNIVINTERV_ED_ALL_ED
Bed/Stretcher in lowest position, wheels locked, appropriate side rails in place/Call bell, personal items and telephone in reach/Instruct patient to call for assistance before getting out of bed/chair/stretcher/Non-slip footwear applied when patient is off stretcher/[place] to call system/Physically safe environment - no spills, clutter or unnecessary equipment/Purposeful proactive rounding/Room/bathroom lighting operational, light cord in reach Bed/Stretcher in lowest position, wheels locked, appropriate side rails in place/Call bell, personal items and telephone in reach/Instruct patient to call for assistance before getting out of bed/chair/stretcher/Non-slip footwear applied when patient is off stretcher/[place] to call system/Physically safe environment - no spills, clutter or unnecessary equipment/Purposeful proactive rounding/Room/bathroom lighting operational, light cord in reach

## 2023-12-04 NOTE — H&P ADULT - PROBLEM SELECTOR PLAN 2
Uses Lisinopril 40mg PO qD and Amlodipine 10mg PO qD at home  -c/w amlodipine 10mg PO qD  -restart Lisinopril if remains hypertensive Protonix IV BID   GI Consult , discussed with GI - Panning for EGD in am   NPO after midnight

## 2023-12-04 NOTE — ED PROVIDER NOTE - OBJECTIVE STATEMENT
73F PMH DVT s/p IVC filter then subsequent removal (now on xarelto - last dose yesterday morning), asthma, HTN, PAD, LE bone infarcts, anemia (has had PRBC in the past), p/w black stool, x10d. Initially attributed it to change in medication (same medication - just new colored pill), however yesterday she felt "anemic" (JOE, fatigue, lightheaded) so came to ED today. Also constipated, last BM was 2d ago. Feels abd bloating. No other systemic symptoms.   Not on iron.   Denies f/c, abd pain, HA, URI symptoms, CP, NVD, urinary complaints, rashes, LE pain/swelling, bleeding from elsewhere.

## 2023-12-04 NOTE — PATIENT PROFILE ADULT - FALL HARM RISK - HARM RISK INTERVENTIONS
Assistance OOB with selected safe patient handling equipment/Communicate Risk of Fall with Harm to all staff/Discuss with provider need for PT consult/Monitor gait and stability/Provide patient with walking aids - walker, cane, crutches/Reinforce activity limits and safety measures with patient and family/Tailored Fall Risk Interventions/Visual Cue: Yellow wristband and red socks/Bed in lowest position, wheels locked, appropriate side rails in place/Call bell, personal items and telephone in reach/Instruct patient to call for assistance before getting out of bed or chair/Non-slip footwear when patient is out of bed/[place] to call system/Physically safe environment - no spills, clutter or unnecessary equipment/Purposeful Proactive Rounding/Room/bathroom lighting operational, light cord in reach Assistance OOB with selected safe patient handling equipment/Communicate Risk of Fall with Harm to all staff/Discuss with provider need for PT consult/Monitor gait and stability/Provide patient with walking aids - walker, cane, crutches/Reinforce activity limits and safety measures with patient and family/Tailored Fall Risk Interventions/Visual Cue: Yellow wristband and red socks/Bed in lowest position, wheels locked, appropriate side rails in place/Call bell, personal items and telephone in reach/Instruct patient to call for assistance before getting out of bed or chair/Non-slip footwear when patient is out of bed/[place] to call system/Physically safe environment - no spills, clutter or unnecessary equipment/Purposeful Proactive Rounding/Room/bathroom lighting operational, light cord in reach

## 2023-12-04 NOTE — H&P ADULT - PROBLEM SELECTOR PLAN 4
pt reports hx of DVT many years ago, previously with IVC filter which has now been removed. Reports compliance with Xarelto  -last dose of Xarelto on 12/3  will hold at this time given concern for UGIB uses Metformin 500mg PO qD at home  -mISS while inpatient

## 2023-12-04 NOTE — H&P ADULT - NSHPSOCIALHISTORY_GEN_ALL_CORE
Lives at home with son  independent with ADLs  social alcohol use  previous hx of tobacco use (quit >20 years ago)  no illicit drug use

## 2023-12-04 NOTE — PATIENT PROFILE ADULT - HAS THE PATIENT RECEIVED THE INFLUENZA VACCINE THIS SEASON?
[facility] contacted with request to present to the [facility] ED for evaluation.    LARRY Herron, Intake Counselor   no...

## 2023-12-04 NOTE — ED PROVIDER NOTE - CLINICAL SUMMARY MEDICAL DECISION MAKING FREE TEXT BOX
73F PMH DVT s/p IVC filter then subsequent removal (now on xarelto - last dose yesterday morning), asthma, HTN, PAD, LE bone infarcts, anemia (has had PRBC in the past), p/w black stool, x10d. Initially attributed it to change in medication (same medication - just new colored pill), however yesterday she felt "anemic" (JOE, fatigue, lightheaded) so came to ED today. Also constipated, last BM was 2d ago. Feels abd bloating. No other systemic symptoms.   Vitals wnl, exam as above.   ddx: Possible UGIB. Clinically not ACS, PE, tamponade, dissection, PTX, perf, myocarditis, mediastinitis.   Labs, reassess.

## 2023-12-04 NOTE — ED PROVIDER NOTE - PROGRESS NOTE DETAILS
[place]: Hgb decreased to 6.2, inappropriately low retic, INR 1.33, total iron 17, TIBC 380, % saturation 4. Other labs grossly wnl. Pt remains stable. Will admit medicine for further care.    consented and updated pt.

## 2023-12-04 NOTE — ED PROVIDER NOTE - INTERPRETATION
normal sinus rhythm, Normal axis, Normal CO interval and QRS complex. There are no acute ischemic ST or T-wave changes. normal sinus rhythm, Normal axis, Normal MN interval and QRS complex. There are no acute ischemic ST or T-wave changes.

## 2023-12-04 NOTE — ED ADULT NURSE NOTE - OBJECTIVE STATEMENT
Episode of dark, "bloody" stools prior to arrival without abdominal pain or distention. Pt reports prior hx of the same requiring transfusions. Pt reports dizziness but is otherwise alert, oriented, and able to follow commands without incident. No syncope or near syncope reported. No N/V/D/abdominal pain/fever/chills reported. Pt denies chest pain or SOB. Pt in NAD at time of assessment.

## 2023-12-04 NOTE — H&P ADULT - ASSESSMENT
73F with pmhx of HTN, DVT on Xarelto, HTN, PAD, STARR who presents for 2 day history of fatigue and shortness of breath, admitted for symptomatic anemia, r/o UGIB.

## 2023-12-04 NOTE — CONSULT NOTE ADULT - ATTENDING COMMENTS
Patient seen, examined, and discussed with Dr. Perez. Agree with above. 73F with a h/o PAD, prior DVT (on rivaroxaban), STARR p/w fatigue and SOB x 2d in setting of melena x 10d, found with iron deficiency anemia. PPI BID IV. NPO except meds past MN for EGD tomorrow.     Celina De Souza MD  Gastroenterology

## 2023-12-04 NOTE — H&P ADULT - NSHPLABSRESULTS_GEN_ALL_CORE
.  LABS:                         6.2    4.63  )-----------( 178      ( 04 Dec 2023 13:26 )             20.1     12-04    141  |  108  |  16  ----------------------------<  125<H>  4.2   |  25  |  0.73    Ca    8.6      04 Dec 2023 13:26  Phos  2.5     12-04  Mg     1.9     12-04    TPro  6.9  /  Alb  3.6  /  TBili  <0.2  /  DBili  x   /  AST  20  /  ALT  14  /  AlkPhos  85  12-04    PT/INR - ( 04 Dec 2023 13:26 )   PT: 15.0 sec;   INR: 1.33          PTT - ( 04 Dec 2023 13:26 )  PTT:24.9 sec  Urinalysis Basic - ( 04 Dec 2023 13:26 )    Color: x / Appearance: x / SG: x / pH: x  Gluc: 125 mg/dL / Ketone: x  / Bili: x / Urobili: x   Blood: x / Protein: x / Nitrite: x   Leuk Esterase: x / RBC: x / WBC x   Sq Epi: x / Non Sq Epi: x / Bacteria: x            RADIOLOGY, EKG & ADDITIONAL TESTS: Reviewed.

## 2023-12-04 NOTE — H&P ADULT - HISTORY OF PRESENT ILLNESS
73F with pmhx of HTN, DVT on Xarelto, HTN, PAD, STARR who presents for 2 day history of fatigue and shortness of breath. Pt reports over the last 10 days pt has been having dark stools. Initially thought the dark bowel movements were secondary to a medication change (her lisinopril dosage remained the same, but the pill itself changed in shape and color). Due to persistent fatigue, she was nervous she had low blood counts as she has had in the past, which is what prompted her to come to the ED. Pt reports she last had a blood transfusion 2-3 years ago and has not required one recently (follows with a PCP who checks her labs).     Of note, pt reports in 2016 she had an episode of hematemsis 2/2 NSAID use. She was hospitalized at [facility] and required transfusions at that time and "emergent surgery" but is unsure what the surgery was. Since then, pt was instructed not to take NSAID.     In the ED:    Vitals: T 97.7, HR 81, /89, 99% on RA  Labs: WBC 5.64, Hgb 6.2, plt 178, INR 1.33, Cr 0.73  total iron 17, % sat 4, unsaturated iron binding capacity 380  EKG: NSR at 68bpm, no acute ST wave changes  CXR: increased vascular markings b/l, no acute infiltrate  Interventions: protonix 80mg IV x1 73F with pmhx of HTN, DVT on Xarelto, HTN, PAD, STARR who presents for 2 day history of fatigue and shortness of breath. Pt reports over the last 10 days pt has been having dark stools. Initially thought the dark bowel movements were secondary to a medication change (her lisinopril dosage remained the same, but the pill itself changed in shape and color). Due to persistent fatigue, she was nervous she had low blood counts as she has had in the past, which is what prompted her to come to the ED. Pt reports she last had a blood transfusion 2-3 years ago and has not required one recently (follows with a PCP who checks her labs).     Of note, pt reports in 2016 she had an episode of hematemsis 2/2 NSAID use. She was hospitalized at [facility] and required transfusions at that time and "emergent surgery" but is unsure what the surgery was. Since then, pt was instructed not to take NSAID.     In the ED:    Vitals: T 97.7, HR 81, /89, 99% on RA  Labs: WBC 5.64, Hgb 6.2, plt 178, INR 1.33, Cr 0.73  total iron 17, % sat 4, unsaturated iron binding capacity 380  EKG: NSR at 68bpm, no acute ST wave changes  CXR: increased vascular markings b/l, no acute infiltrate  Interventions: protonix 80mg IV x1 73F with pmhx of HTN, DVT on Xarelto, HTN, PAD, STARR who presents for 2 day history of fatigue and shortness of breath. Pt reports over the last 10 days pt has been having dark stools. Initially thought the dark bowel movements were secondary to a medication change (her lisinopril dosage remained the same, but the pill itself changed in shape and color). Due to persistent fatigue, she was nervous she had low blood counts as she has had in the past, which is what prompted her to come to the ED. Pt reports she last had a blood transfusion 2-3 years ago and has not required one recently (follows with a PCP who checks her labs).     Of note, pt reports in 2016 she had an episode of hematemesis 2/2 NSAID use. She was hospitalized at [facility] and required transfusions at that time and "emergent surgery" but is unsure what the surgery was. Since then, pt was instructed not to take NSAIDs.     In the ED:  Vitals: T 97.7, HR 81, /89, 99% on RA  Labs: WBC 5.64, Hgb 6.2, plt 178, INR 1.33, Cr 0.73  total iron 17, % sat 4, unsaturated iron binding capacity 380  EKG: NSR at 68bpm, no acute ST wave changes  CXR: increased vascular markings b/l, no acute infiltrate  Interventions: protonix 80mg IV x1

## 2023-12-05 ENCOUNTER — TRANSCRIPTION ENCOUNTER (OUTPATIENT)
Age: 73
End: 2023-12-05

## 2023-12-05 LAB
ALBUMIN SERPL ELPH-MCNC: 3.5 G/DL — SIGNIFICANT CHANGE UP (ref 3.3–5)
ALBUMIN SERPL ELPH-MCNC: 3.5 G/DL — SIGNIFICANT CHANGE UP (ref 3.3–5)
ALP SERPL-CCNC: 72 U/L — SIGNIFICANT CHANGE UP (ref 40–120)
ALP SERPL-CCNC: 72 U/L — SIGNIFICANT CHANGE UP (ref 40–120)
ALT FLD-CCNC: 9 U/L — LOW (ref 10–45)
ALT FLD-CCNC: 9 U/L — LOW (ref 10–45)
ANION GAP SERPL CALC-SCNC: 8 MMOL/L — SIGNIFICANT CHANGE UP (ref 5–17)
ANION GAP SERPL CALC-SCNC: 8 MMOL/L — SIGNIFICANT CHANGE UP (ref 5–17)
APTT BLD: 26.6 SEC — SIGNIFICANT CHANGE UP (ref 24.5–35.6)
APTT BLD: 26.6 SEC — SIGNIFICANT CHANGE UP (ref 24.5–35.6)
AST SERPL-CCNC: 16 U/L — SIGNIFICANT CHANGE UP (ref 10–40)
AST SERPL-CCNC: 16 U/L — SIGNIFICANT CHANGE UP (ref 10–40)
BASOPHILS # BLD AUTO: 0.01 K/UL — SIGNIFICANT CHANGE UP (ref 0–0.2)
BASOPHILS # BLD AUTO: 0.02 K/UL — SIGNIFICANT CHANGE UP (ref 0–0.2)
BASOPHILS # BLD AUTO: 0.02 K/UL — SIGNIFICANT CHANGE UP (ref 0–0.2)
BASOPHILS NFR BLD AUTO: 0.2 % — SIGNIFICANT CHANGE UP (ref 0–2)
BASOPHILS NFR BLD AUTO: 0.4 % — SIGNIFICANT CHANGE UP (ref 0–2)
BASOPHILS NFR BLD AUTO: 0.4 % — SIGNIFICANT CHANGE UP (ref 0–2)
BILIRUB SERPL-MCNC: 0.3 MG/DL — SIGNIFICANT CHANGE UP (ref 0.2–1.2)
BILIRUB SERPL-MCNC: 0.3 MG/DL — SIGNIFICANT CHANGE UP (ref 0.2–1.2)
BUN SERPL-MCNC: 14 MG/DL — SIGNIFICANT CHANGE UP (ref 7–23)
BUN SERPL-MCNC: 14 MG/DL — SIGNIFICANT CHANGE UP (ref 7–23)
CALCIUM SERPL-MCNC: 8.4 MG/DL — SIGNIFICANT CHANGE UP (ref 8.4–10.5)
CALCIUM SERPL-MCNC: 8.4 MG/DL — SIGNIFICANT CHANGE UP (ref 8.4–10.5)
CHLORIDE SERPL-SCNC: 107 MMOL/L — SIGNIFICANT CHANGE UP (ref 96–108)
CHLORIDE SERPL-SCNC: 107 MMOL/L — SIGNIFICANT CHANGE UP (ref 96–108)
CO2 SERPL-SCNC: 24 MMOL/L — SIGNIFICANT CHANGE UP (ref 22–31)
CO2 SERPL-SCNC: 24 MMOL/L — SIGNIFICANT CHANGE UP (ref 22–31)
CREAT SERPL-MCNC: 0.71 MG/DL — SIGNIFICANT CHANGE UP (ref 0.5–1.3)
CREAT SERPL-MCNC: 0.71 MG/DL — SIGNIFICANT CHANGE UP (ref 0.5–1.3)
EGFR: 90 ML/MIN/1.73M2 — SIGNIFICANT CHANGE UP
EGFR: 90 ML/MIN/1.73M2 — SIGNIFICANT CHANGE UP
EOSINOPHIL # BLD AUTO: 0.1 K/UL — SIGNIFICANT CHANGE UP (ref 0–0.5)
EOSINOPHIL # BLD AUTO: 0.13 K/UL — SIGNIFICANT CHANGE UP (ref 0–0.5)
EOSINOPHIL # BLD AUTO: 0.13 K/UL — SIGNIFICANT CHANGE UP (ref 0–0.5)
EOSINOPHIL NFR BLD AUTO: 1.6 % — SIGNIFICANT CHANGE UP (ref 0–6)
EOSINOPHIL NFR BLD AUTO: 1.6 % — SIGNIFICANT CHANGE UP (ref 0–6)
EOSINOPHIL NFR BLD AUTO: 1.9 % — SIGNIFICANT CHANGE UP (ref 0–6)
EOSINOPHIL NFR BLD AUTO: 1.9 % — SIGNIFICANT CHANGE UP (ref 0–6)
EOSINOPHIL NFR BLD AUTO: 2.4 % — SIGNIFICANT CHANGE UP (ref 0–6)
EOSINOPHIL NFR BLD AUTO: 2.4 % — SIGNIFICANT CHANGE UP (ref 0–6)
GLUCOSE BLDC GLUCOMTR-MCNC: 119 MG/DL — HIGH (ref 70–99)
GLUCOSE BLDC GLUCOMTR-MCNC: 119 MG/DL — HIGH (ref 70–99)
GLUCOSE SERPL-MCNC: 95 MG/DL — SIGNIFICANT CHANGE UP (ref 70–99)
GLUCOSE SERPL-MCNC: 95 MG/DL — SIGNIFICANT CHANGE UP (ref 70–99)
HCT VFR BLD CALC: 21.7 % — LOW (ref 34.5–45)
HCT VFR BLD CALC: 21.7 % — LOW (ref 34.5–45)
HCT VFR BLD CALC: 22.1 % — LOW (ref 34.5–45)
HCT VFR BLD CALC: 22.1 % — LOW (ref 34.5–45)
HCT VFR BLD CALC: 24.5 % — LOW (ref 34.5–45)
HCT VFR BLD CALC: 24.5 % — LOW (ref 34.5–45)
HCT VFR BLD CALC: 25.8 % — LOW (ref 34.5–45)
HCT VFR BLD CALC: 25.8 % — LOW (ref 34.5–45)
HGB BLD-MCNC: 6.7 G/DL — CRITICAL LOW (ref 11.5–15.5)
HGB BLD-MCNC: 6.7 G/DL — CRITICAL LOW (ref 11.5–15.5)
HGB BLD-MCNC: 7 G/DL — CRITICAL LOW (ref 11.5–15.5)
HGB BLD-MCNC: 7 G/DL — CRITICAL LOW (ref 11.5–15.5)
HGB BLD-MCNC: 7.9 G/DL — LOW (ref 11.5–15.5)
HGB BLD-MCNC: 7.9 G/DL — LOW (ref 11.5–15.5)
HGB BLD-MCNC: 8.2 G/DL — LOW (ref 11.5–15.5)
HGB BLD-MCNC: 8.2 G/DL — LOW (ref 11.5–15.5)
IMM GRANULOCYTES NFR BLD AUTO: 0.2 % — SIGNIFICANT CHANGE UP (ref 0–0.9)
IMM GRANULOCYTES NFR BLD AUTO: 0.2 % — SIGNIFICANT CHANGE UP (ref 0–0.9)
IMM GRANULOCYTES NFR BLD AUTO: 0.3 % — SIGNIFICANT CHANGE UP (ref 0–0.9)
IMM GRANULOCYTES NFR BLD AUTO: 0.3 % — SIGNIFICANT CHANGE UP (ref 0–0.9)
IMM GRANULOCYTES NFR BLD AUTO: 0.4 % — SIGNIFICANT CHANGE UP (ref 0–0.9)
IMM GRANULOCYTES NFR BLD AUTO: 0.4 % — SIGNIFICANT CHANGE UP (ref 0–0.9)
INR BLD: 1.06 — SIGNIFICANT CHANGE UP (ref 0.85–1.18)
INR BLD: 1.06 — SIGNIFICANT CHANGE UP (ref 0.85–1.18)
LYMPHOCYTES # BLD AUTO: 1.53 K/UL — SIGNIFICANT CHANGE UP (ref 1–3.3)
LYMPHOCYTES # BLD AUTO: 1.53 K/UL — SIGNIFICANT CHANGE UP (ref 1–3.3)
LYMPHOCYTES # BLD AUTO: 1.76 K/UL — SIGNIFICANT CHANGE UP (ref 1–3.3)
LYMPHOCYTES # BLD AUTO: 1.76 K/UL — SIGNIFICANT CHANGE UP (ref 1–3.3)
LYMPHOCYTES # BLD AUTO: 1.89 K/UL — SIGNIFICANT CHANGE UP (ref 1–3.3)
LYMPHOCYTES # BLD AUTO: 1.89 K/UL — SIGNIFICANT CHANGE UP (ref 1–3.3)
LYMPHOCYTES # BLD AUTO: 28 % — SIGNIFICANT CHANGE UP (ref 13–44)
LYMPHOCYTES # BLD AUTO: 28 % — SIGNIFICANT CHANGE UP (ref 13–44)
LYMPHOCYTES # BLD AUTO: 28.7 % — SIGNIFICANT CHANGE UP (ref 13–44)
LYMPHOCYTES # BLD AUTO: 28.7 % — SIGNIFICANT CHANGE UP (ref 13–44)
LYMPHOCYTES # BLD AUTO: 35.7 % — SIGNIFICANT CHANGE UP (ref 13–44)
LYMPHOCYTES # BLD AUTO: 35.7 % — SIGNIFICANT CHANGE UP (ref 13–44)
MAGNESIUM SERPL-MCNC: 1.7 MG/DL — SIGNIFICANT CHANGE UP (ref 1.6–2.6)
MAGNESIUM SERPL-MCNC: 1.7 MG/DL — SIGNIFICANT CHANGE UP (ref 1.6–2.6)
MCHC RBC-ENTMCNC: 22.9 PG — LOW (ref 27–34)
MCHC RBC-ENTMCNC: 22.9 PG — LOW (ref 27–34)
MCHC RBC-ENTMCNC: 23.2 PG — LOW (ref 27–34)
MCHC RBC-ENTMCNC: 23.2 PG — LOW (ref 27–34)
MCHC RBC-ENTMCNC: 24.2 PG — LOW (ref 27–34)
MCHC RBC-ENTMCNC: 24.2 PG — LOW (ref 27–34)
MCHC RBC-ENTMCNC: 24.3 PG — LOW (ref 27–34)
MCHC RBC-ENTMCNC: 24.3 PG — LOW (ref 27–34)
MCHC RBC-ENTMCNC: 30.9 GM/DL — LOW (ref 32–36)
MCHC RBC-ENTMCNC: 30.9 GM/DL — LOW (ref 32–36)
MCHC RBC-ENTMCNC: 31.7 GM/DL — LOW (ref 32–36)
MCHC RBC-ENTMCNC: 31.7 GM/DL — LOW (ref 32–36)
MCHC RBC-ENTMCNC: 31.8 GM/DL — LOW (ref 32–36)
MCHC RBC-ENTMCNC: 31.8 GM/DL — LOW (ref 32–36)
MCHC RBC-ENTMCNC: 32.2 GM/DL — SIGNIFICANT CHANGE UP (ref 32–36)
MCHC RBC-ENTMCNC: 32.2 GM/DL — SIGNIFICANT CHANGE UP (ref 32–36)
MCV RBC AUTO: 73.2 FL — LOW (ref 80–100)
MCV RBC AUTO: 73.2 FL — LOW (ref 80–100)
MCV RBC AUTO: 74.1 FL — LOW (ref 80–100)
MCV RBC AUTO: 74.1 FL — LOW (ref 80–100)
MCV RBC AUTO: 75.2 FL — LOW (ref 80–100)
MCV RBC AUTO: 75.2 FL — LOW (ref 80–100)
MCV RBC AUTO: 76.6 FL — LOW (ref 80–100)
MCV RBC AUTO: 76.6 FL — LOW (ref 80–100)
MONOCYTES # BLD AUTO: 0.45 K/UL — SIGNIFICANT CHANGE UP (ref 0–0.9)
MONOCYTES # BLD AUTO: 0.45 K/UL — SIGNIFICANT CHANGE UP (ref 0–0.9)
MONOCYTES # BLD AUTO: 0.51 K/UL — SIGNIFICANT CHANGE UP (ref 0–0.9)
MONOCYTES # BLD AUTO: 0.51 K/UL — SIGNIFICANT CHANGE UP (ref 0–0.9)
MONOCYTES # BLD AUTO: 0.56 K/UL — SIGNIFICANT CHANGE UP (ref 0–0.9)
MONOCYTES # BLD AUTO: 0.56 K/UL — SIGNIFICANT CHANGE UP (ref 0–0.9)
MONOCYTES NFR BLD AUTO: 8.2 % — SIGNIFICANT CHANGE UP (ref 2–14)
MONOCYTES NFR BLD AUTO: 8.2 % — SIGNIFICANT CHANGE UP (ref 2–14)
MONOCYTES NFR BLD AUTO: 9.1 % — SIGNIFICANT CHANGE UP (ref 2–14)
MONOCYTES NFR BLD AUTO: 9.1 % — SIGNIFICANT CHANGE UP (ref 2–14)
MONOCYTES NFR BLD AUTO: 9.6 % — SIGNIFICANT CHANGE UP (ref 2–14)
MONOCYTES NFR BLD AUTO: 9.6 % — SIGNIFICANT CHANGE UP (ref 2–14)
NEUTROPHILS # BLD AUTO: 2.76 K/UL — SIGNIFICANT CHANGE UP (ref 1.8–7.4)
NEUTROPHILS # BLD AUTO: 2.76 K/UL — SIGNIFICANT CHANGE UP (ref 1.8–7.4)
NEUTROPHILS # BLD AUTO: 3.33 K/UL — SIGNIFICANT CHANGE UP (ref 1.8–7.4)
NEUTROPHILS # BLD AUTO: 3.33 K/UL — SIGNIFICANT CHANGE UP (ref 1.8–7.4)
NEUTROPHILS # BLD AUTO: 3.69 K/UL — SIGNIFICANT CHANGE UP (ref 1.8–7.4)
NEUTROPHILS # BLD AUTO: 3.69 K/UL — SIGNIFICANT CHANGE UP (ref 1.8–7.4)
NEUTROPHILS NFR BLD AUTO: 52.2 % — SIGNIFICANT CHANGE UP (ref 43–77)
NEUTROPHILS NFR BLD AUTO: 52.2 % — SIGNIFICANT CHANGE UP (ref 43–77)
NEUTROPHILS NFR BLD AUTO: 60.1 % — SIGNIFICANT CHANGE UP (ref 43–77)
NEUTROPHILS NFR BLD AUTO: 60.1 % — SIGNIFICANT CHANGE UP (ref 43–77)
NEUTROPHILS NFR BLD AUTO: 60.8 % — SIGNIFICANT CHANGE UP (ref 43–77)
NEUTROPHILS NFR BLD AUTO: 60.8 % — SIGNIFICANT CHANGE UP (ref 43–77)
NRBC # BLD: 0 /100 WBCS — SIGNIFICANT CHANGE UP (ref 0–0)
PHOSPHATE SERPL-MCNC: 2.8 MG/DL — SIGNIFICANT CHANGE UP (ref 2.5–4.5)
PHOSPHATE SERPL-MCNC: 2.8 MG/DL — SIGNIFICANT CHANGE UP (ref 2.5–4.5)
PLATELET # BLD AUTO: 184 K/UL — SIGNIFICANT CHANGE UP (ref 150–400)
PLATELET # BLD AUTO: 184 K/UL — SIGNIFICANT CHANGE UP (ref 150–400)
PLATELET # BLD AUTO: 190 K/UL — SIGNIFICANT CHANGE UP (ref 150–400)
PLATELET # BLD AUTO: 190 K/UL — SIGNIFICANT CHANGE UP (ref 150–400)
PLATELET # BLD AUTO: 200 K/UL — SIGNIFICANT CHANGE UP (ref 150–400)
PLATELET # BLD AUTO: 200 K/UL — SIGNIFICANT CHANGE UP (ref 150–400)
PLATELET # BLD AUTO: 216 K/UL — SIGNIFICANT CHANGE UP (ref 150–400)
PLATELET # BLD AUTO: 216 K/UL — SIGNIFICANT CHANGE UP (ref 150–400)
POTASSIUM SERPL-MCNC: 4 MMOL/L — SIGNIFICANT CHANGE UP (ref 3.5–5.3)
POTASSIUM SERPL-MCNC: 4 MMOL/L — SIGNIFICANT CHANGE UP (ref 3.5–5.3)
POTASSIUM SERPL-SCNC: 4 MMOL/L — SIGNIFICANT CHANGE UP (ref 3.5–5.3)
POTASSIUM SERPL-SCNC: 4 MMOL/L — SIGNIFICANT CHANGE UP (ref 3.5–5.3)
PROT SERPL-MCNC: 6.7 G/DL — SIGNIFICANT CHANGE UP (ref 6–8.3)
PROT SERPL-MCNC: 6.7 G/DL — SIGNIFICANT CHANGE UP (ref 6–8.3)
PROTHROM AB SERPL-ACNC: 12.1 SEC — SIGNIFICANT CHANGE UP (ref 9.5–13)
PROTHROM AB SERPL-ACNC: 12.1 SEC — SIGNIFICANT CHANGE UP (ref 9.5–13)
RBC # BLD: 2.93 M/UL — LOW (ref 3.8–5.2)
RBC # BLD: 2.93 M/UL — LOW (ref 3.8–5.2)
RBC # BLD: 3.02 M/UL — LOW (ref 3.8–5.2)
RBC # BLD: 3.02 M/UL — LOW (ref 3.8–5.2)
RBC # BLD: 3.26 M/UL — LOW (ref 3.8–5.2)
RBC # BLD: 3.26 M/UL — LOW (ref 3.8–5.2)
RBC # BLD: 3.37 M/UL — LOW (ref 3.8–5.2)
RBC # BLD: 3.37 M/UL — LOW (ref 3.8–5.2)
RBC # FLD: 18.4 % — HIGH (ref 10.3–14.5)
RBC # FLD: 18.4 % — HIGH (ref 10.3–14.5)
RBC # FLD: 18.6 % — HIGH (ref 10.3–14.5)
RBC # FLD: 18.6 % — HIGH (ref 10.3–14.5)
RBC # FLD: 19 % — HIGH (ref 10.3–14.5)
SODIUM SERPL-SCNC: 139 MMOL/L — SIGNIFICANT CHANGE UP (ref 135–145)
SODIUM SERPL-SCNC: 139 MMOL/L — SIGNIFICANT CHANGE UP (ref 135–145)
WBC # BLD: 5.29 K/UL — SIGNIFICANT CHANGE UP (ref 3.8–10.5)
WBC # BLD: 5.29 K/UL — SIGNIFICANT CHANGE UP (ref 3.8–10.5)
WBC # BLD: 5.47 K/UL — SIGNIFICANT CHANGE UP (ref 3.8–10.5)
WBC # BLD: 5.47 K/UL — SIGNIFICANT CHANGE UP (ref 3.8–10.5)
WBC # BLD: 6.14 K/UL — SIGNIFICANT CHANGE UP (ref 3.8–10.5)
WBC # BLD: 6.14 K/UL — SIGNIFICANT CHANGE UP (ref 3.8–10.5)
WBC # BLD: 6.52 K/UL — SIGNIFICANT CHANGE UP (ref 3.8–10.5)
WBC # BLD: 6.52 K/UL — SIGNIFICANT CHANGE UP (ref 3.8–10.5)
WBC # FLD AUTO: 5.29 K/UL — SIGNIFICANT CHANGE UP (ref 3.8–10.5)
WBC # FLD AUTO: 5.29 K/UL — SIGNIFICANT CHANGE UP (ref 3.8–10.5)
WBC # FLD AUTO: 5.47 K/UL — SIGNIFICANT CHANGE UP (ref 3.8–10.5)
WBC # FLD AUTO: 5.47 K/UL — SIGNIFICANT CHANGE UP (ref 3.8–10.5)
WBC # FLD AUTO: 6.14 K/UL — SIGNIFICANT CHANGE UP (ref 3.8–10.5)
WBC # FLD AUTO: 6.14 K/UL — SIGNIFICANT CHANGE UP (ref 3.8–10.5)
WBC # FLD AUTO: 6.52 K/UL — SIGNIFICANT CHANGE UP (ref 3.8–10.5)
WBC # FLD AUTO: 6.52 K/UL — SIGNIFICANT CHANGE UP (ref 3.8–10.5)

## 2023-12-05 PROCEDURE — 43235 EGD DIAGNOSTIC BRUSH WASH: CPT | Mod: GC

## 2023-12-05 PROCEDURE — 99232 SBSQ HOSP IP/OBS MODERATE 35: CPT

## 2023-12-05 RX ORDER — SENNA PLUS 8.6 MG/1
1 TABLET ORAL ONCE
Refills: 0 | Status: COMPLETED | OUTPATIENT
Start: 2023-12-05 | End: 2023-12-05

## 2023-12-05 RX ORDER — AMLODIPINE BESYLATE 2.5 MG/1
1 TABLET ORAL
Refills: 0 | DISCHARGE

## 2023-12-05 RX ORDER — ALBUTEROL 90 UG/1
2 AEROSOL, METERED ORAL
Refills: 0 | DISCHARGE

## 2023-12-05 RX ORDER — SOD SULF/SODIUM/NAHCO3/KCL/PEG
4000 SOLUTION, RECONSTITUTED, ORAL ORAL ONCE
Refills: 0 | Status: COMPLETED | OUTPATIENT
Start: 2023-12-05 | End: 2023-12-05

## 2023-12-05 RX ORDER — FLUTICASONE FUROATE, UMECLIDINIUM BROMIDE AND VILANTEROL TRIFENATATE 200; 62.5; 25 UG/1; UG/1; UG/1
1 POWDER RESPIRATORY (INHALATION)
Refills: 0 | DISCHARGE

## 2023-12-05 RX ORDER — METFORMIN HYDROCHLORIDE 850 MG/1
1 TABLET ORAL
Refills: 0 | DISCHARGE

## 2023-12-05 RX ORDER — POLYETHYLENE GLYCOL 3350 17 G/17G
17 POWDER, FOR SOLUTION ORAL ONCE
Refills: 0 | Status: COMPLETED | OUTPATIENT
Start: 2023-12-05 | End: 2023-12-05

## 2023-12-05 RX ORDER — LISINOPRIL 2.5 MG/1
1 TABLET ORAL
Refills: 0 | DISCHARGE

## 2023-12-05 RX ORDER — RIVAROXABAN 15 MG-20MG
1 KIT ORAL
Refills: 0 | DISCHARGE

## 2023-12-05 RX ADMIN — PANTOPRAZOLE SODIUM 40 MILLIGRAM(S): 20 TABLET, DELAYED RELEASE ORAL at 12:03

## 2023-12-05 RX ADMIN — SENNA PLUS 1 TABLET(S): 8.6 TABLET ORAL at 00:32

## 2023-12-05 RX ADMIN — BUDESONIDE AND FORMOTEROL FUMARATE DIHYDRATE 2 PUFF(S): 160; 4.5 AEROSOL RESPIRATORY (INHALATION) at 21:29

## 2023-12-05 RX ADMIN — PANTOPRAZOLE SODIUM 40 MILLIGRAM(S): 20 TABLET, DELAYED RELEASE ORAL at 21:29

## 2023-12-05 RX ADMIN — BUDESONIDE AND FORMOTEROL FUMARATE DIHYDRATE 2 PUFF(S): 160; 4.5 AEROSOL RESPIRATORY (INHALATION) at 00:35

## 2023-12-05 RX ADMIN — IRON SUCROSE 110 MILLIGRAM(S): 20 INJECTION, SOLUTION INTRAVENOUS at 21:38

## 2023-12-05 RX ADMIN — POLYETHYLENE GLYCOL 3350 17 GRAM(S): 17 POWDER, FOR SOLUTION ORAL at 00:32

## 2023-12-05 RX ADMIN — AMLODIPINE BESYLATE 10 MILLIGRAM(S): 2.5 TABLET ORAL at 19:49

## 2023-12-05 RX ADMIN — BUDESONIDE AND FORMOTEROL FUMARATE DIHYDRATE 2 PUFF(S): 160; 4.5 AEROSOL RESPIRATORY (INHALATION) at 10:19

## 2023-12-05 RX ADMIN — Medication 4000 MILLILITER(S): at 19:49

## 2023-12-05 NOTE — DISCHARGE NOTE PROVIDER - NSDCCPTREATMENT_GEN_ALL_CORE_FT
PRINCIPAL PROCEDURE  Procedure: Colonoscopy  Findings and Treatment: Colonoscopy performed in endo suite for melena and STARR.  Impressions:  - Brown stool in the whole colon.  - A faintly erythematous patch was seen in the cecum, possibly suggestive of angioectasia.      SECONDARY PROCEDURE  Procedure: Upper endoscopy  Findings and Treatment: EGD performed in endo suite for STARR and melena.  Impressions:  - Normal esophagus.  - Normal duodenum.  - Deformity in the pre-pyloric region. Findings do not clearly explain her anemia.

## 2023-12-05 NOTE — PRE-ANESTHESIA EVALUATION ADULT - NSDENTALSD_ENT_ALL_CORE
Missing several rear molars, poor dentition with several chipped teeth, ground/worn teeth, cavities./missing teeth Full upper and lower dentures, edentulous./missing teeth

## 2023-12-05 NOTE — DISCHARGE NOTE PROVIDER - NSDCCPCAREPLAN_GEN_ALL_CORE_FT
PRINCIPAL DISCHARGE DIAGNOSIS  Diagnosis: Upper GI bleeding  Assessment and Plan of Treatment: Gastrointestinal (GI) bleeding refers to any bleeding that starts in the gastrointestinal tract.  Bleeding may come from any site along the GI tract, but is often divided into:  - Upper GI bleeding: The upper GI tract includes the esophagus (the tube from the mouth to the stomach), stomach, and first part of the small intestine (duodenum).  - Lower GI bleeding: The lower GI tract includes most of the small intestine, large intestine, rectum, and anus.  GI bleeding may be due to conditions that are not serious, including: Anal fissures or Hemorrhoids. Other causes of GI bleeding may include: Abnormal blood vessels in the lining of the intestines (also called angiodysplasia), Bleeding diverticulum, or diverticulosis, Crohn disease or ulcerative colitis, Esophageal varices, Esophagitis, Gastric (stomach) ulcer, Intussusception (bowel telescoped on itself), Addis-Lord tear or Radiation injury to the bowel.   GI bleeding often stops on its own. In many cases, bleeding can be treated with medicine or a procedure during a test. If you have an upper GI bleed, you will be given an IV drug known as a proton pump inhibitor (PPI) to suppress stomach acid production which you may need to continue.   Depending on the amount of blood loss and whether you continue to bleed, you might need fluids through a needle (IV) and, possibly, blood transfusions. If you take blood-thinning medicines, including aspirin or nonsteroidal anti-inflammatory medications, you might need to stop.       PRINCIPAL DISCHARGE DIAGNOSIS  Diagnosis: Upper GI bleeding  Assessment and Plan of Treatment: Gastrointestinal (GI) bleeding refers to any bleeding that starts in the gastrointestinal tract.  Bleeding may come from any site along the GI tract, but is often divided into:  - Upper GI bleeding: The upper GI tract includes the esophagus (the tube from the mouth to the stomach), stomach, and first part of the small intestine (duodenum).  - Lower GI bleeding: The lower GI tract includes most of the small intestine, large intestine, rectum, and anus.  GI bleeding may be due to conditions that are not serious, including: Anal fissures or Hemorrhoids. Other causes of GI bleeding may include: Abnormal blood vessels in the lining of the intestines (also called angiodysplasia), Bleeding diverticulum, or diverticulosis, Crohn disease or ulcerative colitis, Esophageal varices, Esophagitis, Gastric (stomach) ulcer, Intussusception (bowel telescoped on itself), Addis-Lord tear or Radiation injury to the bowel.   GI bleeding often stops on its own. In many cases, bleeding can be treated with medicine or a procedure during a test. If you have an upper GI bleed, you will be given an IV drug known as a proton pump inhibitor (PPI) to suppress stomach acid production which you may need to continue.   Depending on the amount of blood loss and whether you continue to bleed, you might need fluids through a needle (IV) and, possibly, blood transfusions. If you take blood-thinning medicines, including aspirin or nonsteroidal anti-inflammatory medications, you might need to stop.  - Please start taking ferrous sulfate 200 mg (65 mg elemental iron) on Mondays, Wednesdays and Fridays.   - Please follow up with outpatient gastroenterology on 12/26 at 3pm.

## 2023-12-05 NOTE — DISCHARGE NOTE PROVIDER - ATTENDING DISCHARGE PHYSICAL EXAMINATION:
-Gen: NAD, resting in bed  -HEENT: EOMI, PERRL, no scleral icterus, no JVD  -CV: normal S1 and S2  -Lungs: CTABL, normal respiratory effort on RA  -Ab: soft, NT, ND, normal BS  -Ext: no LE edema   -Neuro: A&O x 3, no focal deficits

## 2023-12-05 NOTE — PROGRESS NOTE ADULT - PROBLEM SELECTOR PLAN 3
Uses Lisinopril 40mg PO qD and Amlodipine 10mg PO qD at home  -c/w amlodipine 10mg PO qD  -restart Lisinopril if remains hypertensive Uses Lisinopril 40mg PO qD and Amlodipine 10mg PO qD at home  -c/w amlodipine 10mg PO qD  -restart Lisinopril if hypertensive

## 2023-12-05 NOTE — PROGRESS NOTE ADULT - ATTENDING COMMENTS
73F with PMH of HTN, PAD, STARR, DVT presenting with recurrent episodes of melena and generalized weakness, admitted for further workup.      Physical exam  General: No acute distress, currently getting blood transfusion  HEENT: normocephalic, atraumatic, MMM  Cards: RRR, normal S1S2  Pulm: CTAB, no wheeze, crackles, rales or rhonchi  Ab: soft, nontender, nondistended, normoactive bowel sounds  Ext: wwp, no edema  Neuro: speech is fluent, no facial asymmetry, moves all extremities  Skin: warm and dry, no obvious rashes or lesions    #UGIB  - concern for upper GIB  - IV PPI  - EGD today, colonoscopy tomorrow  - follow up post-transfusion CBC  - Transfusion goal 7    35 minutes spent on this encounter, including face to face with patient, care coordination and documentation.

## 2023-12-05 NOTE — DISCHARGE NOTE PROVIDER - NSDCFUSCHEDAPPT_GEN_ALL_CORE_FT
[facility]  [address]STR[address]  Scheduled Appointment: 12/26/2023     [facility]  [address]STR[address]  Scheduled Appointment: 12/26/2023

## 2023-12-05 NOTE — DISCHARGE NOTE PROVIDER - HOSPITAL COURSE
#Discharge: do not delete    Patient is __ yo M/F with past medical history of _____, presented with _____, found to have _____    Inpatient treatment course:     Problem List/Main Diagnoses:     New medications/therapies:   New lines/hardware:  Labs to be followed outpatient:   Exam to be followed outpatient:     Discharge plan: discharge to ______     #Discharge: do not delete    73F with pmhx of HTN, DVT on Xarelto, HTN, PAD, STARR who presented for 2 day history of fatigue and shortness of breath, and 10 days of dark stools. Due to persistent fatigue, she was nervous she had low blood counts as she has had in the past, which is what prompted her to come to the ED. After admission the patient received 2 units of pRBCs and underwent an EGD which showed ______.    Problem List/Main Diagnoses:   #Anemia due to acute blood loss.   Pt presented after 10 day history of dark stool and 2 day history of mild dyspnea on exertion with fatigue. Reports hx of blood transfusions in the past, last 2 years ago. 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 Also reports hx of hematemesis 2/2 NSAID use in 2016, requiring "emergent surgery" and blood transfusions. Per pt, no recent NSAID use, significant alcohol use. Denies smoking or other drug use.  Baseline Hgb of 8.5, presented with Hgb of 6.2. Iron studies/MCV pointing toward STARR.  - GI consulted: Plan for EGD 12/05, ________   - complete Iron IV 200mg 12/4-12/7 course   - f/u with GI as outpatient    #Upper GI bleeding.   Pt states stool has been dark for the past 10 days. Last BM sunday 12/03. Concerning for UGIB.   - Protonix IV 40mg BID   - GI consulted: Plan for EGD 12/05, ________   - f/u with GI as outpatient  - c to hold xarelto.    #Hypertension.   Uses Lisinopril 40mg PO qD and Amlodipine 10mg PO qD at home  -c/w amlodipine 10mg PO qD  -______ lisinopril 40mg po qd    # Diabetes.   uses Metformin 1000mg PO qD at home  - restart home metformin    #History of DVT in adulthood.   Pt reports hx of DVT many years ago, previously with IVC filter which has now been removed. Most recent DVT was in 02/2023. Reports compliance with Xarelto  - Last dose of Xarelto on 12/3  - Hold AC at this time given concern for UGIB, get recs from GI regarding when to restart AC.    New medications/therapies: __________  New lines/hardware: __________  Labs to be followed outpatient: ________  Exam to be followed outpatient: GI follow up, PCP follow up     Discharge plan: discharge to home.     #Discharge: do not delete    73F with pmhx of HTN, DVT on Xarelto, HTN, PAD, STARR who presented for 2 day history of fatigue and shortness of breath, and 10 days of dark stools. Due to persistent fatigue, she was nervous she had low blood counts as she has had in the past, which is what prompted her to come to the ED. After admission the patient received 2 units of pRBCs and underwent an EGD and colonoscopy which showed post-ulcer deformities in antrum and possible cecal AVM, which could possible be the source of GI bleed. Patient was restarted on xarelto and CBC was monitored and Hgb was stable with no further bloody stools.       Problem List/Main Diagnoses:   #Anemia due to acute blood loss.   Pt presented after 10 day history of dark stool and 2 day history of mild dyspnea on exertion with fatigue. Reports hx of blood transfusions in the past, last 2 years ago. 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 Also reports hx of hematemesis 2/2 NSAID use in 2016, requiring "emergent surgery" and blood transfusions. Per pt, no recent NSAID use, significant alcohol use. Denies smoking or other drug use.  Baseline Hgb of 8.5, presented with Hgb of 6.2. Iron studies/MCV pointing toward STARR.  - GI consulted: Plan for EGD 12/05, ________   - complete Iron IV 200mg 12/4-12/7 course   - f/u with GI as outpatient    #Upper GI bleeding.   Pt states stool has been dark for the past 10 days. Last BM sunday 12/03. Concerning for UGIB.   - Protonix IV 40mg BID   - GI consulted: Plan for EGD 12/05, ________   - f/u with GI as outpatient  - c to hold xarelto.    #Hypertension.   Uses Lisinopril 40mg PO qD and Amlodipine 10mg PO qD at home  -c/w amlodipine 10mg PO qD  -______ lisinopril 40mg po qd    # Diabetes.   uses Metformin 1000mg PO qD at home  - restart home metformin    #History of DVT in adulthood.   Pt reports hx of DVT many years ago, previously with IVC filter which has now been removed. Most recent DVT was in 02/2023. Reports compliance with Xarelto  - Last dose of Xarelto on 12/3  - Hold AC at this time given concern for UGIB, get recs from GI regarding when to restart AC.    New medications/therapies: __________  New lines/hardware: __________  Labs to be followed outpatient: ________  Exam to be followed outpatient: GI follow up, PCP follow up     Discharge plan: discharge to home.     #Discharge: do not delete    73F with pmhx of HTN, DVT on Xarelto, HTN, PAD, STARR who presented for 2 day history of fatigue and shortness of breath, and 10 days of dark stools. Due to persistent fatigue, she was nervous she had low blood counts as she has had in the past, which is what prompted her to come to the ED. At ED found to have Hgb of 6.2. After admission the patient required 2 units of pRBCs and underwent an EGD and colonoscopy which showed post-ulcer deformities in antrum and possible cecal AVM, which could possibly be the source of GI bleed. However, colonoscopy showed stool throughout the colon so GI recommends outpatient colonoscopy with 2-day prep and VCE. Patient was restarted on xarelto and CBC was monitored and Hgb was stable at 8-9 with no further bloody stools.       Problem List/Main Diagnoses:   #Anemia due to acute blood loss.   Pt presenting after 10-day history of melena and Hgb of 6.2. S/p 2U pRBCs. Iron studies and MCV consistent with STARR. Started iron infusion on 12/4. EGD 12/5 negative. Colonoscopy 12/6 showed stool throughout the colon and possible angioectasia in cecum. Patient restarted on xarelto and monitored ON with Hgb stable at 8-9 and no further bloody stools.  - GI recs: F/u outpatient colonoscopy with 2-day prep and VCE to investigate source of bleeding. No GI contraindication to restarting xarelto.   - Completed Iron 200mg IV qd (12/4-12/7 course)     #Upper GI bleeding.   Pt presented with 10-day history of melena. EGD negative. Colonoscopy 12/6 showed stool throughout the colon and possible angioectasia in cecum.   - F/u H. pylori stool antigen   - Following GI recs: f/u outpatient colonoscopy with 2-day prep and VCE    #Hypertension.   Uses Lisinopril 40mg PO qD and Amlodipine 10mg PO qD at home  -c/w amlodipine 10mg PO qD  -______ lisinopril 40mg po qd    # Diabetes.   Uses Metformin 1000mg PO qD at home  - restart home metformin    #History of DVT in adulthood.   Pt reports hx of DVT many years ago, previously with IVC filter which has now been removed. Most recent DVT was in 02/2023. Reports compliance with Xarelto  - Last dose of Xarelto on 12/3      New medications/therapies: __________  New lines/hardware: __________  Labs to be followed outpatient: ________  Exam to be followed outpatient: GI follow up, PCP follow up     Discharge plan: discharge to home.     #Discharge: do not delete    73F with pmhx of HTN, DVT on Xarelto, HTN, PAD, STARR who presented for 2 day history of fatigue and shortness of breath, and 10 days of dark stools. Due to persistent fatigue, she was nervous she had low blood counts as she has had in the past, which is what prompted her to come to the ED. At ED found to have Hgb of 6.2. After admission the patient required 2 units of pRBCs and underwent an EGD and colonoscopy which showed post-ulcer deformities in antrum and possible cecal AVM, which could possibly be the source of GI bleed. However, colonoscopy showed stool throughout the colon so GI recommends outpatient colonoscopy with 2-day prep and VCE. Patient was restarted on xarelto and CBC was monitored and Hgb was stable at 8-9 with no further bloody stools.     Problem List/Main Diagnoses:   #Anemia due to acute blood loss.   Pt presenting after 10-day history of melena and Hgb of 6.2. S/p 2U pRBCs. Iron studies and MCV consistent with STARR. Started iron infusion on 12/4. EGD 12/5 negative. Colonoscopy 12/6 showed stool throughout the colon and possible angioectasia in cecum. Patient restarted on xarelto and monitored ON with Hgb stable at 8-9 and no further bloody stools.  - GI recs: F/u outpatient colonoscopy with 2-day prep and VCE to investigate source of bleeding. No GI contraindication to restarting xarelto.   - Completed Iron 200mg IV qd (12/4-12/7 course)     #Upper GI bleeding.   Pt presented with 10-day history of melena. EGD negative. Colonoscopy 12/6 showed stool throughout the colon and possible angioectasia in cecum.   - F/u H. pylori stool antigen   - Following GI recs: f/u outpatient colonoscopy with 2-day prep and VCE    #Hypertension.   Uses Lisinopril 40mg PO qD and Amlodipine 10mg PO qD at home  - Given amlodipine 10mg PO qD  - Restart home Lisinopril and Amlodipine    # Diabetes.   Uses Metformin 1000mg PO qD at home  - Given mISS while inpatient  - Restart home metformin    #History of DVT in adulthood.   Pt reports hx of DVT many years ago, previously with IVC filter which has now been removed. Most recent DVT was in 02/2023. Reports compliance with Xarelto  - Last dose of Xarelto on 12/3  - Xarelto held during hospital course, restarted on 12/7PM and CBC stable    New medications/therapies: __________  New lines/hardware: __________  Labs to be followed outpatient: ________  Exam to be followed outpatient: GI follow up, PCP follow up     Discharge plan: discharge to home.     #Discharge: do not delete    73F with pmhx of HTN, DVT on Xarelto, HTN, PAD, STARR who presented for 2 day history of fatigue and shortness of breath, and 10 days of dark stools. Due to persistent fatigue, she was nervous she had low blood counts as she has had in the past, which is what prompted her to come to the ED. At ED found to have Hgb of 6.2. After admission the patient required 2 units of pRBCs and underwent an EGD and colonoscopy which showed post-ulcer deformities in antrum and possible cecal AVM, which could possibly be the source of GI bleed. However, colonoscopy showed stool throughout the colon so GI recommends outpatient colonoscopy with 2-day prep and VCE. Patient was restarted on xarelto and CBC was monitored and Hgb was stable at 8-9 with no further bloody stools.     Problem List/Main Diagnoses:   #Anemia due to acute blood loss.   Pt presenting after 10-day history of melena and Hgb of 6.2. S/p 2U pRBCs. Iron studies and MCV consistent with STARR. Started iron infusion on 12/4. EGD 12/5 negative. Colonoscopy 12/6 showed stool throughout the colon and possible angioectasia in cecum. Patient restarted on xarelto and monitored ON with Hgb stable at 8-9 and no further bloody stools.  - GI recs: F/u outpatient colonoscopy with 2-day prep and VCE to investigate source of bleeding. No GI contraindication to restarting xarelto.   - Completed Iron 200mg IV qd (12/4-12/7 course)     #Upper GI bleeding.   Pt presented with 10-day history of melena. EGD negative. Colonoscopy 12/6 showed stool throughout the colon and possible angioectasia in cecum.   - F/u H. pylori stool antigen   - Following GI recs: f/u outpatient colonoscopy with 2-day prep and VCE    #Hypertension.   Uses Lisinopril 40mg PO qD and Amlodipine 10mg PO qD at home  - Given amlodipine 10mg PO qD  - Restart home Lisinopril and Amlodipine    # Diabetes.   Uses Metformin 1000mg PO qD at home  - Given mISS while inpatient  - Restart home metformin    #History of DVT in adulthood.   Pt reports hx of DVT many years ago, previously with IVC filter which has now been removed. Most recent DVT was in 02/2023. Reports compliance with Xarelto  - Last dose of Xarelto on 12/3  - Xarelto held during hospital course, restarted on 12/7PM and CBC stable    New medications/therapies:   - Iron pills PO      New lines/hardware: __________    Labs to be followed outpatient:      Exam to be followed outpatient: GI follow up, PCP follow up     Discharge plan: discharge to home.     #Discharge: do not delete    73F with pmhx of HTN, DVT on Xarelto, HTN, PAD, STARR who presented for 2 day history of fatigue and shortness of breath, and 10 days of dark stools. Due to persistent fatigue, she was nervous she had low blood counts as she has had in the past, which is what prompted her to come to the ED. At ED found to have Hgb of 6.2. After admission the patient required 2 units of pRBCs and underwent an EGD and colonoscopy which showed post-ulcer deformities in antrum and possible cecal AVM, which could possibly be the source of GI bleed. However, colonoscopy showed stool throughout the colon so GI recommends outpatient colonoscopy with 2-day prep and VCE. Patient was restarted on xarelto and CBC was monitored and Hgb was stable at 8-9 with no further bloody stools. Had a normal BM with beige color on 12/8.     Problem List/Main Diagnoses:   #Anemia due to acute blood loss.   Pt presenting after 10-day history of melena and Hgb of 6.2. S/p 2U pRBCs. Iron studies and MCV consistent with STARR. Started iron infusion on 12/4. EGD 12/5 negative. Colonoscopy 12/6 showed stool throughout the colon and possible angioectasia in cecum. Patient restarted on xarelto and monitored ON with Hgb stable at 8-9 and no further bloody stools.  - GI recs: F/u outpatient colonoscopy with 2-day prep and VCE to investigate source of bleeding. No GI contraindication to restarting xarelto.   - Completed Iron 200mg IV qd (12/4-12/7 course)   - Prescribed ferrous sulfate 200 mg (65 mg elemental iron)    #Upper GI bleeding.   Pt presented with 10-day history of melena. EGD negative. Colonoscopy 12/6 showed stool throughout the colon and possible angioectasia in cecum.   - F/u H. pylori stool antigen   - Following GI recs: f/u outpatient colonoscopy with 2-day prep and VCE    #Hypertension.   Uses Lisinopril 40mg PO qD and Amlodipine 10mg PO qD at home  - Continue with home meds    # Diabetes.   Uses Metformin 1000mg PO qD at home  - Continue with home meds    #History of DVT in adulthood.   Pt reports hx of DVT many years ago, previously with IVC filter which has now been removed. Most recent DVT was in 02/2023. Reports compliance with Xarelto. Initially was held but restarted on 12/7, Hb stable and no signs or symptoms of bleeding. Last BM beige color on 12/8.   - Continue with home meds    New medications/therapies: ferrous sulfate 200 mg (65 mg elemental iron) on MWF    Labs to be followed outpatient: hemoglobin and iron studies    Exam to be followed outpatient: GI follow up, PCP follow up     Discharge plan: discharge to home.

## 2023-12-05 NOTE — CHART NOTE - NSCHARTNOTEFT_GEN_A_CORE
EGD performed in endo suite for STARR and melena.    Impressions:  - Normal esophagus.  - Normal duodenum.  - Deformity in the pre-pyloric region. Findings do not clearly explain her anemia.    Plan:	  - Stool H. Pylori Antigen  - Clear liquid diet after dinner and NPO except meds at midnight for colonoscopy tomorrow.   - Bowel prep with 4L Golytely, and for better taste pt should drink chilled and with a straw.    Bob ([facility]) MD Chris  Gastroenterology Fellow  Pager (M-F 7a-5p): [phone]  Pager (after hours): Please call  for on-call fellow EGD performed in endo suite for STARR and melena.    Impressions:  - Normal esophagus.  - Normal duodenum.  - Deformity in the pre-pyloric region. Findings do not clearly explain her anemia.    Plan:	  - Stool H. Pylori Antigen  - Clear liquid diet after dinner and NPO except meds at midnight for colonoscopy tomorrow.   - Bowel prep with 4L Golytely, and for better taste pt should drink chilled and with a straw.    Bob ([facility]) MD Chris  Gastroenterology Fellow  Pager (M-F 7a-5p): [phone]  Pager (after hours): Please call  for on-call fellow

## 2023-12-05 NOTE — PROGRESS NOTE ADULT - ASSESSMENT
73F with pmhx of HTN, 2 episodes of DVT (most recent in 02/2023) on Xarelto, HTN, PAD, STARR who presents for 2 day history of fatigue and shortness of breath, admitted for symptomatic anemia, r/o UGIB. 73F with pmhx of HTN, 2 episodes of DVT (most recent in 02/2023) on Xarelto, HTN, PAD, STARR who presents for 2 day history of fatigue and shortness of breath, admitted for symptomatic anemia, c/f UGIB pending EGD 12/5

## 2023-12-05 NOTE — DISCHARGE NOTE PROVIDER - NSDCMRMEDTOKEN_GEN_ALL_CORE_FT
Albuterol (Eqv-ProAir HFA) 90 mcg/inh inhalation aerosol: 2 puff(s) inhaled every 6 hours as needed for  shortness of breath and/or wheezing  amLODIPine 5 mg oral tablet: 1 tab(s) orally once a day  lisinopril 40 mg oral tablet: 1 tab(s) orally once a day  metFORMIN 1000 mg oral tablet, extended release: 1 tab(s) orally once a day  Trelegy Ellipta 200 mcg-62.5 mcg-25 mcg/inh inhalation powder: 1 puff(s) inhaled once a day  Xarelto 20 mg oral tablet: 1 tab(s) orally once a day   Albuterol (Eqv-ProAir HFA) 90 mcg/inh inhalation aerosol: 2 puff(s) inhaled every 6 hours as needed for  shortness of breath and/or wheezing  amLODIPine 5 mg oral tablet: 1 tab(s) orally once a day  ferrous sulfate 200 mg (65 mg elemental iron) oral tablet: 1 tab(s) orally every other day Please take one tablet on Mondays, Wednesdays and Fridays  lisinopril 40 mg oral tablet: 1 tab(s) orally once a day  metFORMIN 1000 mg oral tablet, extended release: 1 tab(s) orally once a day  Trelegy Ellipta 200 mcg-62.5 mcg-25 mcg/inh inhalation powder: 1 puff(s) inhaled once a day  Xarelto 20 mg oral tablet: 1 tab(s) orally once a day

## 2023-12-05 NOTE — PROGRESS NOTE ADULT - SUBJECTIVE AND OBJECTIVE BOX
O/N Events:  Admitted overnight and given 1 unit of pRBC     Subjective/ROS: Patient seen and examined at bedside, resting comfortably in bed. Endorses generalized fatigue. Patient states she has not had BM since Sunday and feels constipated.   Denies Fever/Chills, HA, CP, n/v, changes in urinary habits.      VITALS  Vital Signs Last 24 Hrs  T(C): 36.7 (05 Dec 2023 05:55), Max: 37.3 (04 Dec 2023 16:52)  T(F): 98.1 (05 Dec 2023 05:55), Max: 99.2 (04 Dec 2023 16:52)  HR: 69 (05 Dec 2023 05:55) (69 - 81)  BP: 136/65 (05 Dec 2023 05:55) (112/58 - 152/89)  BP(mean): --  RR: 18 (05 Dec 2023 05:55) (16 - 18)  SpO2: 97% (05 Dec 2023 05:55) (97% - 100%)    Parameters below as of 05 Dec 2023 05:55  Patient On (Oxygen Delivery Method): room air    PHYSICAL EXAM  General: NAD  Head: NC/AT; MMM; PERRL  Neck: Supple; no JVD  Respiratory: CTAB; no wheezes/rales/rhonchi  Cardiovascular: Regular rhythm/rate; S1/S2+, no murmurs, rubs gallops   Gastrointestinal: Soft; NTND  Extremities: WWP; no edema/cyanosis  Neurological: A&Ox3, CNII-XII grossly intact; no obvious focal deficits    Antimicrobials:  MEDICATIONS  (STANDING):    Standing Medications:  MEDICATIONS  (STANDING):  amLODIPine   Tablet 10 milliGRAM(s) Oral every 24 hours  budesonide  80 MICROgram(s)/formoterol 4.5 MICROgram(s) Inhaler 2 Puff(s) Inhalation two times a day  influenza  Vaccine (HIGH DOSE) 0.7 milliLiter(s) IntraMuscular once  iron sucrose IVPB 200 milliGRAM(s) IV Intermittent every 24 hours  pantoprazole  Injectable 40 milliGRAM(s) IV Push every 12 hours  tiotropium 2.5 MICROgram(s) Inhaler 2 Puff(s) Inhalation daily      PRN Medications:  MEDICATIONS  (PRN):      penicillin (Unknown)  ibuprofen (Unknown)      LABS                        6.7    5.29  )-----------( 190      ( 05 Dec 2023 05:30 )             21.7     12-05    139  |  107  |  14  ----------------------------<  95  4.0   |  24  |  0.71    Ca    8.4      05 Dec 2023 05:30  Phos  2.8     12-05  Mg     1.7     12-05    TPro  6.7  /  Alb  3.5  /  TBili  0.3  /  DBili  x   /  AST  16  /  ALT  9<L>  /  AlkPhos  72  12-05    PT/INR - ( 05 Dec 2023 05:30 )   PT: 12.1 sec;   INR: 1.06          PTT - ( 05 Dec 2023 05:30 )  PTT:26.6 sec  Urinalysis Basic - ( 05 Dec 2023 05:30 )    Color: x / Appearance: x / SG: x / pH: x  Gluc: 95 mg/dL / Ketone: x  / Bili: x / Urobili: x   Blood: x / Protein: x / Nitrite: x   Leuk Esterase: x / RBC: x / WBC x   Sq Epi: x / Non Sq Epi: x / Bacteria: x              IMAGING/EKG/ETC   O/N Events:  - Admitted overnight and given 1 unit of pRBC     Subjective/ROS:  - Patient seen and examined at bedside, resting comfortably in bed. Endorses generalized fatigue. Patient states she has not had BM since Sunday and feels constipated.   - Denies Fever/Chills, HA, CP, n/v, changes in urinary habits.      Vital Signs Last 24 Hrs  T(C): 36.7 (05 Dec 2023 05:55), Max: 37.3 (04 Dec 2023 16:52)  T(F): 98.1 (05 Dec 2023 05:55), Max: 99.2 (04 Dec 2023 16:52)  HR: 69 (05 Dec 2023 05:55) (69 - 81)  BP: 136/65 (05 Dec 2023 05:55) (112/58 - 152/89)  BP(mean): --  RR: 18 (05 Dec 2023 05:55) (16 - 18)  SpO2: 97% (05 Dec 2023 05:55) (97% - 100%)    Parameters below as of 05 Dec 2023 05:55  Patient On (Oxygen Delivery Method): room air    PHYSICAL EXAM  General: NAD  Head: NC/AT; MMM; PERRL  Neck: Supple; no JVD  Respiratory: CTAB; no wheezes/rales/rhonchi  Cardiovascular: Regular rhythm/rate; S1/S2+, no murmurs, rubs gallops   Gastrointestinal: Soft; NT, mildly distended  Extremities: WWP; no edema/cyanosis  Neurological: A&Ox3, CNII-XII grossly intact; no obvious focal deficits    MEDICATIONS  (STANDING):  amLODIPine   Tablet 10 milliGRAM(s) Oral every 24 hours  budesonide  80 MICROgram(s)/formoterol 4.5 MICROgram(s) Inhaler 2 Puff(s) Inhalation two times a day  influenza  Vaccine (HIGH DOSE) 0.7 milliLiter(s) IntraMuscular once  iron sucrose IVPB 200 milliGRAM(s) IV Intermittent every 24 hours  pantoprazole  Injectable 40 milliGRAM(s) IV Push every 12 hours  tiotropium 2.5 MICROgram(s) Inhaler 2 Puff(s) Inhalation daily    MEDICATIONS  (PRN):  penicillin (Unknown)  ibuprofen (Unknown)      LABS                        6.7    5.29  )-----------( 190      ( 05 Dec 2023 05:30 )             21.7     12-05    139  |  107  |  14  ----------------------------<  95  4.0   |  24  |  0.71    Ca    8.4      05 Dec 2023 05:30  Phos  2.8     12-05  Mg     1.7     12-05    TPro  6.7  /  Alb  3.5  /  TBili  0.3  /  DBili  x   /  AST  16  /  ALT  9<L>  /  AlkPhos  72  12-05    PT/INR - ( 05 Dec 2023 05:30 )   PT: 12.1 sec;   INR: 1.06          PTT - ( 05 Dec 2023 05:30 )  PTT:26.6 sec  Urinalysis Basic - ( 05 Dec 2023 05:30 )    Color: x / Appearance: x / SG: x / pH: x  Gluc: 95 mg/dL / Ketone: x  / Bili: x / Urobili: x   Blood: x / Protein: x / Nitrite: x   Leuk Esterase: x / RBC: x / WBC x   Sq Epi: x / Non Sq Epi: x / Bacteria: x    IMAGING/EKG/ETC  - reviewed

## 2023-12-05 NOTE — PRE-ANESTHESIA EVALUATION ADULT - NSRADCARDRESULTSFT_GEN_ALL_CORE
CXR 12/4/2023  "FINDINGS:    Normal cardiomediastinal and hilar silhouettes. The lungs are clear.  No   pleural effusions. No pneumothorax. Degenerative changes of the thoracic   spine. The soft tissues are unremarkable.      IMPRESSION:  No acute pathology."

## 2023-12-05 NOTE — PRE-ANESTHESIA EVALUATION ADULT - NSANTHADDINFOFT_GEN_ALL_CORE
Multiple PRBC transfusions for initial Hgb 6.2 on 12/4/2023, received PRBC 12/5/2023. Recheck Hgb 7.9.

## 2023-12-05 NOTE — PROGRESS NOTE ADULT - PROBLEM SELECTOR PLAN 1
Pt presenting after 10 day history of dark stool and 2 day history of mild dyspnea on exertion with fatigue. Reports hx of blood transfusions in the past, last 2 years ago. 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 Also reports hx of hematemesis 2/2 NSAID use in 2016, requiring "emergent surgery" and blood transfusions. Per pt, no recent NSAID use, significant alcohol use. Denies smoking or other drug use.  - Baseline Hgb of 8.5, presented with Hgb of 6.2  - 1u pRBC ordered --> Hbg increased from 6.2 to 7.0 now down to 6.7  - GI consulted: Plan for EGD 12/05   - Fe study: Fe 17, TIBC 397, Ferritin 9   - Start Iron IV 200mg   - Monitor CBC (goal Hgb >7, transfuse if below)  - Maintain active T&S, 2 large bore IVs Pt presenting after 10 day history of dark stool and 2 day history of mild dyspnea on exertion with fatigue. Reports hx of blood transfusions in the past, last 2 years ago. 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 Also reports hx of hematemesis 2/2 NSAID use in 2016, requiring "emergent surgery" and blood transfusions. Per pt, no recent NSAID use, significant alcohol use. Denies smoking or other drug use.  Baseline Hgb of 8.5, presented with Hgb of 6.2. Iron studies/MCV pointing toward STARR.  - 1u pRBC ordered --> Hbg increased from 6.2 to 7.0 now down to 6.7  - GI consulted: Plan for EGD 12/05   - Start Iron IV 200mg 12/4-12/7 course   - Monitor CBC (goal Hgb >7, transfuse if below)  - Maintain active T&S, 2 large bore IVs

## 2023-12-05 NOTE — CONSULT NOTE ADULT - SUBJECTIVE AND OBJECTIVE BOX
Patient is a 73y old  Female who presents with a chief complaint of Melena (05 Dec 2023 07:26)      HPI:  73F with pmhx of HTN, DVT on Xarelto, HTN, PAD, STARR who presents for 2 day history of fatigue and shortness of breath. Pt reports over the last 10 days pt has been having dark stools. Initially thought the dark bowel movements were secondary to a medication change (her lisinopril dosage remained the same, but the pill itself changed in shape and color). Due to persistent fatigue, she was nervous she had low blood counts as she has had in the past, which is what prompted her to come to the ED. Pt reports she last had a blood transfusion 2-3 years ago and has not required one recently (follows with a PCP who checks her labs).     Of note, pt reports in 2016 she had an episode of hematemesis 2/2 NSAID use. She was hospitalized at [facility] and required transfusions at that time and "emergent surgery" but is unsure what the surgery was. Since then, pt was instructed not to take NSAIDs.     In the ED:  Vitals: T 97.7, HR 81, /89, 99% on RA  Labs: WBC 5.64, Hgb 6.2, plt 178, INR 1.33, Cr 0.73  total iron 17, % sat 4, unsaturated iron binding capacity 380  EKG: NSR at 68bpm, no acute ST wave changes  CXR: increased vascular markings b/l, no acute infiltrate  Interventions: protonix 80mg IV x1 (04 Dec 2023 15:03)    PAST MEDICAL & SURGICAL HISTORY:  DVT, lower extremity      Asthma      PAD (peripheral artery disease)      HTN (hypertension)      H/O  section        MEDICATIONS  (STANDING):  amLODIPine   Tablet 10 milliGRAM(s) Oral every 24 hours  budesonide  80 MICROgram(s)/formoterol 4.5 MICROgram(s) Inhaler 2 Puff(s) Inhalation two times a day  influenza  Vaccine (HIGH DOSE) 0.7 milliLiter(s) IntraMuscular once  iron sucrose IVPB 200 milliGRAM(s) IV Intermittent every 24 hours  pantoprazole  Injectable 40 milliGRAM(s) IV Push every 12 hours  tiotropium 2.5 MICROgram(s) Inhaler 2 Puff(s) Inhalation daily    MEDICATIONS  (PRN):          FAMILY HISTORY:  No pertinent family history in first degree relatives        CBC Full  -  ( 05 Dec 2023 05:30 )  WBC Count : 5.29 K/uL  RBC Count : 2.93 M/uL  Hemoglobin : 6.7 g/dL  Hematocrit : 21.7 %  Platelet Count - Automated : 190 K/uL  Mean Cell Volume : 74.1 fl  Mean Cell Hemoglobin : 22.9 pg  Mean Cell Hemoglobin Concentration : 30.9 gm/dL  Auto Neutrophil # : 2.76 K/uL  Auto Lymphocyte # : 1.89 K/uL  Auto Monocyte # : 0.51 K/uL  Auto Eosinophil # : 0.10 K/uL  Auto Basophil # : 0.01 K/uL  Auto Neutrophil % : 52.2 %  Auto Lymphocyte % : 35.7 %  Auto Monocyte % : 9.6 %  Auto Eosinophil % : 1.9 %  Auto Basophil % : 0.2 %      12    139  |  107  |  14  ----------------------------<  95  4.0   |  24  |  0.71    Ca    8.4      05 Dec 2023 05:30  Phos  2.8     12-  Mg     1.7     12-    TPro  6.7  /  Alb  3.5  /  TBili  0.3  /  DBili  x   /  AST  16  /  ALT  9<L>  /  AlkPhos  72  12-05      Urinalysis Basic - ( 05 Dec 2023 05:30 )    Color: x / Appearance: x / SG: x / pH: x  Gluc: 95 mg/dL / Ketone: x  / Bili: x / Urobili: x   Blood: x / Protein: x / Nitrite: x   Leuk Esterase: x / RBC: x / WBC x   Sq Epi: x / Non Sq Epi: x / Bacteria: x        Radiology :     < from: Xray Chest 1 View- PORTABLE-Routine (Xray Chest 1 View- PORTABLE-Routine .) (23 @ 14:06) >  ACC: [id number] EXAM:  XR CHEST PORTABLE ROUTINE 1V   ORDERED BY: HAI HERNANDEZ     PROCEDURE DATE:  2023          INTERPRETATION:  XR CHEST dated 2023 at 1:47 PM    CLINICAL INFORMATION: Dyspnea on exertion    TECHNIQUE: Single frontal view of the chest    COMPARISON: None available    FINDINGS:    Normal cardiomediastinal and hilar silhouettes. The lungs are clear.  No   pleural effusions. No pneumothorax. Degenerative changes of the thoracic   spine. The soft tissues are unremarkable.      IMPRESSION:  No acute pathology.              Review of Systems : per HPI         Vital Signs Last 24 Hrs  T(C): 36.7 (05 Dec 2023 05:55), Max: 37.3 (04 Dec 2023 16:52)  T(F): 98.1 (05 Dec 2023 05:55), Max: 99.2 (04 Dec 2023 16:52)  HR: 69 (05 Dec 2023 05:55) (69 - 81)  BP: 136/65 (05 Dec 2023 05:55) (112/58 - 152/89)  BP(mean): --  RR: 18 (05 Dec 2023 05:55) (16 - 18)  SpO2: 97% (05 Dec 2023 05:55) (97% - 100%)    Parameters below as of 05 Dec 2023 05:55  Patient On (Oxygen Delivery Method): room air            Physical Exam :  73 y o woman lying comfortably in semi Peña's position , awake , alert , no acute complaints , feels tired     Head : normocephalic , atraumatic    Eyes : PERRLA , EOMI , no nystagmus , sclera anicteric    ENT / FACE : neg nasal discharge , uvula midline , no oropharyngeal erythema / exudate    Neck : supple , negative JVD , negative carotid bruits , no thyromegaly    Chest : CTA bilaterally , neg wheeze / rhonchi / rales / crackles / egophany    Cardiovascular : regular rate and rhythm , neg murmurs / rubs / gallops    Abdomen : soft , non distended , non tender to palpation in all 4 quadrants ,  normal bowel sounds     Extremities : WWP , neg cyanosis /clubbing / edema      Neurologic Exam :     Alert and oriented  x 3        Motor Exam:                Right UE:                     no focal weakness ,  > 3+/5 throughout       Left UE:                       no focal weakness ,  > 3+/5 throughout          Right LE:          no focal weakness ,  > 3+/5 throughout        Left LE:          no focal weakness ,  > 3+/5 throughout           Sensation :         intact to light touch x 4 extremities                                 DTR :           biceps/brachioradialis : equal                            patella/ankle : equal          Gait :  not tested          PM&R Impression : admitted for symptomatic anemia, r/o UGIB      Recommendations / Plan :       1) Physical / Occupational therapy focusing on therapeutic exercises , equipment evaluation , bed mobility/transfer out of bed evaluation , progressive ambulation with assistive devices prn .    2) Current disposition plan recommendation  :   d/c home          Patient is a 73y old  Female who presents with a chief complaint of Melena (05 Dec 2023 07:26)      HPI:  73F with pmhx of HTN, DVT on Xarelto, HTN, PAD, STARR who presents for 2 day history of fatigue and shortness of breath. Pt reports over the last 10 days pt has been having dark stools. Initially thought the dark bowel movements were secondary to a medication change (her lisinopril dosage remained the same, but the pill itself changed in shape and color). Due to persistent fatigue, she was nervous she had low blood counts as she has had in the past, which is what prompted her to come to the ED. Pt reports she last had a blood transfusion 2-3 years ago and has not required one recently (follows with a PCP who checks her labs).     Of note, pt reports in 2016 she had an episode of hematemesis 2/2 NSAID use. She was hospitalized at [facility] and required transfusions at that time and "emergent surgery" but is unsure what the surgery was. Since then, pt was instructed not to take NSAIDs.     In the ED:  Vitals: T 97.7, HR 81, /89, 99% on RA  Labs: WBC 5.64, Hgb 6.2, plt 178, INR 1.33, Cr 0.73  total iron 17, % sat 4, unsaturated iron binding capacity 380  EKG: NSR at 68bpm, no acute ST wave changes  CXR: increased vascular markings b/l, no acute infiltrate  Interventions: protonix 80mg IV x1 (04 Dec 2023 15:03)    PAST MEDICAL & SURGICAL HISTORY:  DVT, lower extremity      Asthma      PAD (peripheral artery disease)      HTN (hypertension)      H/O  section        MEDICATIONS  (STANDING):  amLODIPine   Tablet 10 milliGRAM(s) Oral every 24 hours  budesonide  80 MICROgram(s)/formoterol 4.5 MICROgram(s) Inhaler 2 Puff(s) Inhalation two times a day  influenza  Vaccine (HIGH DOSE) 0.7 milliLiter(s) IntraMuscular once  iron sucrose IVPB 200 milliGRAM(s) IV Intermittent every 24 hours  pantoprazole  Injectable 40 milliGRAM(s) IV Push every 12 hours  tiotropium 2.5 MICROgram(s) Inhaler 2 Puff(s) Inhalation daily    MEDICATIONS  (PRN):          FAMILY HISTORY:  No pertinent family history in first degree relatives        CBC Full  -  ( 05 Dec 2023 05:30 )  WBC Count : 5.29 K/uL  RBC Count : 2.93 M/uL  Hemoglobin : 6.7 g/dL  Hematocrit : 21.7 %  Platelet Count - Automated : 190 K/uL  Mean Cell Volume : 74.1 fl  Mean Cell Hemoglobin : 22.9 pg  Mean Cell Hemoglobin Concentration : 30.9 gm/dL  Auto Neutrophil # : 2.76 K/uL  Auto Lymphocyte # : 1.89 K/uL  Auto Monocyte # : 0.51 K/uL  Auto Eosinophil # : 0.10 K/uL  Auto Basophil # : 0.01 K/uL  Auto Neutrophil % : 52.2 %  Auto Lymphocyte % : 35.7 %  Auto Monocyte % : 9.6 %  Auto Eosinophil % : 1.9 %  Auto Basophil % : 0.2 %      12    139  |  107  |  14  ----------------------------<  95  4.0   |  24  |  0.71    Ca    8.4      05 Dec 2023 05:30  Phos  2.8     12-  Mg     1.7     12-    TPro  6.7  /  Alb  3.5  /  TBili  0.3  /  DBili  x   /  AST  16  /  ALT  9<L>  /  AlkPhos  72  12-05      Urinalysis Basic - ( 05 Dec 2023 05:30 )    Color: x / Appearance: x / SG: x / pH: x  Gluc: 95 mg/dL / Ketone: x  / Bili: x / Urobili: x   Blood: x / Protein: x / Nitrite: x   Leuk Esterase: x / RBC: x / WBC x   Sq Epi: x / Non Sq Epi: x / Bacteria: x        Radiology :     < from: Xray Chest 1 View- PORTABLE-Routine (Xray Chest 1 View- PORTABLE-Routine .) (23 @ 14:06) >  ACC: [id number] EXAM:  XR CHEST PORTABLE ROUTINE 1V   ORDERED BY: HAI HERNANDEZ     PROCEDURE DATE:  2023          INTERPRETATION:  XR CHEST dated 2023 at 1:47 PM    CLINICAL INFORMATION: Dyspnea on exertion    TECHNIQUE: Single frontal view of the chest    COMPARISON: None available    FINDINGS:    Normal cardiomediastinal and hilar silhouettes. The lungs are clear.  No   pleural effusions. No pneumothorax. Degenerative changes of the thoracic   spine. The soft tissues are unremarkable.      IMPRESSION:  No acute pathology.              Review of Systems : per HPI         Vital Signs Last 24 Hrs  T(C): 36.7 (05 Dec 2023 05:55), Max: 37.3 (04 Dec 2023 16:52)  T(F): 98.1 (05 Dec 2023 05:55), Max: 99.2 (04 Dec 2023 16:52)  HR: 69 (05 Dec 2023 05:55) (69 - 81)  BP: 136/65 (05 Dec 2023 05:55) (112/58 - 152/89)  BP(mean): --  RR: 18 (05 Dec 2023 05:55) (16 - 18)  SpO2: 97% (05 Dec 2023 05:55) (97% - 100%)    Parameters below as of 05 Dec 2023 05:55  Patient On (Oxygen Delivery Method): room air            Physical Exam :  73 y o woman lying comfortably in semi Peña's position , awake , alert , no acute complaints , feels tired     Head : normocephalic , atraumatic    Eyes : PERRLA , EOMI , no nystagmus , sclera anicteric    ENT / FACE : neg nasal discharge , uvula midline , no oropharyngeal erythema / exudate    Neck : supple , negative JVD , negative carotid bruits , no thyromegaly    Chest : CTA bilaterally , neg wheeze / rhonchi / rales / crackles / egophany    Cardiovascular : regular rate and rhythm , neg murmurs / rubs / gallops    Abdomen : soft , non distended , non tender to palpation in all 4 quadrants ,  normal bowel sounds     Extremities : WWP , neg cyanosis /clubbing / edema      Neurologic Exam :     Alert and oriented  x 3        Motor Exam:                Right UE:                     no focal weakness ,  > 3+/5 throughout       Left UE:                       no focal weakness ,  > 3+/5 throughout          Right LE:          no focal weakness ,  > 3+/5 throughout        Left LE:          no focal weakness ,  > 3+/5 throughout           Sensation :         intact to light touch x 4 extremities                                 DTR :           biceps/brachioradialis : equal                            patella/ankle : equal          Gait :  not tested          PM&R Impression : admitted for symptomatic anemia, r/o UGIB      Recommendations / Plan :       1) Physical / Occupational therapy focusing on therapeutic exercises , equipment evaluation , bed mobility/transfer out of bed evaluation , progressive ambulation with assistive devices prn .    2) Current disposition plan recommendation  :   d/c home

## 2023-12-05 NOTE — CONSULT NOTE ADULT - ASSESSMENT
{\rtf1\kfgzmz48282\ansi\fsdtpda2765\ftnbj\uc1\deff0  {\fonttbl{\f0 \fnil Segoe UI;}{\f1 \fnil \fcharset0 Segoe UI;}{\f2 \fnil Times New [place];}}  {\colortbl ;\mem225\gfred626\yolo311 ;\red0\green0\blue0 ;\red0\green0\buej622 ;\red0\green0\blue0 ;}  {\stylesheet{\f0\fs20 Normal;}{\cs1 Default Paragraph Font;}{\cs2\f0\fs16 Line Number;}{\cs3\f2\fs24\ul\cf3 Hyperlink;}}  {\*\revtbl{Unknown;}}  \lcwtkk47178\prhntd39282\rvwnt8891\bgpim5573\gtmeu4073\nmxgc0121\ucgilwn957\dqazzfd654\nogrowautofit\gooldr810\formshade\nofeaturethrottle1\dntblnsbdb\fet4\aendnotes\aftnnrlc\pgbrdrhead\pgbrdrfoot  \sectd\azgahi04842\gizwuc75875\guttersxn0\ykaoxwzg6833\zqvslssl6841\qccrioee8630\xolkxzhc9849\iabdayc286\iblidiq748\sbkpage\pgncont\pgndec  \plain\plain\f0\fs24\ql\plain\f0\fs24\plain\f0\fs20\bxzx4223\hich\f0\dbch\f0\loch\f0\fs20\par  I M\par  \par  73 y o F with pmhx of HTN, DVT on Xarelto, HTN, PAD, STARR who presents for 2 day history of fatigue and shortness of breath, admitted for symptomatic anemia, r/o UGIB.\par  \par  \plain\f1\fs20\vbnw6504\hich\f1\dbch\f1\loch\f1\cf2\fs20\ul{\field{\*\fldinst HYPERLINK [id number] }{\fldrslt Problem/Plan - 1:}}\plain\f0\fs20\nvth7046\hich\f0\dbch\f0\loch\f0\fs20\ql\par  \'b7  {\*\bkmkstart [id number]}{\*\bkmkend zj35171742288}Problem: {\*\bkmkstart mn22830297786}{\*\bkmkend tp03047597575}Anemia due to acute blood loss.\plain\f1\fs20\ylro0753\hich\f1\dbch\f1\loch\f1\cf2\fs20\strike\plain\f0\fs20\tifq6116\hich\f0\dbch\f0\loch\f0\fs20\par  \'b7  {\*\bkmkstart fh29988345766}{\*\bkmkend mi72094214313}Plan: {\*\bkmkstart bn75438678908}{\*\bkmkend sr43917602093}Pt presenting after 10 day history of dark stool and 2 day history of mild dyspnea on exertion with fatigue. Reports hx of blood transfusions   in the past, last 2 years ago. 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 Also reports hx of hematemesis 2/2 NSAID use in 2016, requiring "emergent surgery" and blood transfusions. Per pt, no recent NSAID use,   significant alcohol use\par  -during previous admissions, baseline Hgb of 8-9, now presenting with Hgb of 6.2\par  -1u pRBC ordered, f/u post transfusion CBC (goal Hgb >7)\par  -maintain active T&S, 2 large bore IVs\par  -f/u iron studies\par  -GI consulted, f/u recommendations.\plain\f1\fs20\aagq7906\hich\f1\dbch\f1\loch\f1\cf2\fs20\strike\plain\f0\fs20\oepa5308\hich\f0\dbch\f0\loch\f0\fs20\par  \par  \plain\f1\fs20\bxgm1562\hich\f1\dbch\f1\loch\f1\cf2\fs20\ul{\field{\*\fldinst HYPERLINK [id number] }{\fldrslt Problem/Plan - 2:}}\plain\f0\fs20\nobj3712\hich\f0\dbch\f0\loch\f0\fs20\ql\par  \'b7  {\*\bkmkstart hh15056229326}{\*\bkmkend br37194892656}Problem: {\*\bkmkstart eu91475352746}{\*\bkmkend jc71521021668}Upper GI bleeding.\plain\f1\fs20\lwje7091\hich\f1\dbch\f1\loch\f1\cf2\fs20\strike\plain\f0\fs20\qvuh3308\hich\f0\dbch\f0\loch\f0\fs20\par  \'b7  {\*\bkmkstart qh19682226451}{\*\bkmkend pj90025375988}Plan: {\*\bkmkstart xs19304447023}{\*\bkmkend oq42560574866}Protonix IV BID \par  GI Consult , discussed with GI - Panning for EGD in am \par  NPO after midnight.\plain\f1\fs20\suqd6469\hich\f1\dbch\f1\loch\f1\cf2\fs20\strike\plain\f0\fs20\zery0659\hich\f0\dbch\f0\loch\f0\fs20\par  \par  \plain\f1\fs20\pprm6447\hich\f1\dbch\f1\loch\f1\cf2\fs20\ul{\field{\*\fldinst HYPERLINK [id number] }{\fldrslt Problem/Plan - 3:}}\plain\f0\fs20\ruba9189\hich\f0\dbch\f0\loch\f0\fs20\ql\par  \'b7  {\*\bkmkstart mt69785045910}{\*\bkmkend ku52170218428}Problem: {\*\bkmkstart or93264901033}{\*\bkmkend sk69718796205}Hypertension.\plain\f1\fs20\owsx7708\hich\f1\dbch\f1\loch\f1\cf2\fs20\strike\plain\f0\fs20\kofk1138\hich\f0\dbch\f0\loch\f0\fs20\par  \'b7  {\*\bkmkstart av04834638245}{\*\bkmkend cl76295488214}Plan: {\*\bkmkstart xz23988846615}{\*\bkmkend fw00240200512}Uses Lisinopril 40mg PO qD and Amlodipine 10mg PO qD at home\par  -c/w amlodipine 10mg PO qD\par  -restart Lisinopril if remains hypertensive.\plain\f1\fs20\coxc5061\hich\f1\dbch\f1\loch\f1\cf2\fs20\strike\plain\f0\fs20\ohma1216\hich\f0\dbch\f0\loch\f0\fs20\par  \par  \plain\f1\fs20\qgan1303\hich\f1\dbch\f1\loch\f1\cf2\fs20\ul{\field{\*\fldinst HYPERLINK [id number] }{\fldrslt Problem/Plan - 4:}}\plain\f0\fs20\futv0891\hich\f0\dbch\f0\loch\f0\fs20\ql\par  \'b7  {\*\bkmkstart ei07628991724}{\*\bkmkend wv92193927179}Problem: {\*\bkmkstart ej90582859020}{\*\bkmkend ax92025201912}Diabetes.\plain\f1\fs20\eypx1389\hich\f1\dbch\f1\loch\f1\cf2\fs20\strike\plain\f0\fs20\clxf3233\hich\f0\dbch\f0\loch\f0\fs20\par  \'b7  {\*\bkmkstart fs78040158258}{\*\bkmkend sw37814580095}Plan: {\*\bkmkstart xd52651735846}{\*\bkmkend ye79823134274}uses Metformin 500mg PO qD at home\par  -mISS while inpatient.\plain\f1\fs20\uezu0916\hich\f1\dbch\f1\loch\f1\cf2\fs20\strike\plain\f0\fs20\ndzb9529\hich\f0\dbch\f0\loch\f0\fs20\par  \par  \plain\f1\fs20\pdsz1304\hich\f1\dbch\f1\loch\f1\cf2\fs20\ul{\field{\*\fldinst HYPERLINK [id number] }{\fldrslt Problem/Plan - 5:}}\plain\f0\fs20\gcpa9971\hich\f0\dbch\f0\loch\f0\fs20\ql\par  \'b7  {\*\bkmkstart sb97683620138}{\*\bkmkend rz54113936241}Problem: {\*\bkmkstart xu23949229908}{\*\bkmkend kw80640505734}History of DVT in adulthood.\plain\f1\fs20\gfzs4185\hich\f1\dbch\f1\loch\f1\cf2\fs20\strike\plain\f0\fs20\nees2254\hich\f0\dbch\f0\loch\f0\fs20\par  \'b7  {\*\bkmkstart rx61365459754}{\*\bkmkend wu79005199240}Plan: {\*\bkmkstart rh15304301068}{\*\bkmkend kg90783670889}pt reports hx of DVT many years ago, previously with IVC filter which has now been removed. Reports compliance with Xarelto\par  -last dose of Xarelto on 12/3\par  will hold at this time given concern for UGIB.\plain\f1\fs20\wejl2279\hich\f1\dbch\f1\loch\f1\cf2\fs20\strike\plain\f0\fs20\eluq0630\hich\f0\dbch\f0\loch\f0\fs20\par  \par  \plain\f1\fs20\jemt0811\hich\f1\dbch\f1\loch\f1\cf2\fs20\ul{\field{\*\fldinst HYPERLINK [id number] }{\fldrslt Problem/Plan - 6:}}\plain\f0\fs20\goga9223\hich\f0\dbch\f0\loch\f0\fs20\ql\par  \'b7  {\*\bkmkstart uj99956084844}{\*\bkmkend ci27012363305}Problem: {\*\bkmkstart jr51200399773}{\*\bkmkend ai61218205863}Prophylactic measure. \par  \'b7  {\*\bkmkstart yr60747736693}{\*\bkmkend nz86195723044}Plan: {\*\bkmkstart so35816758502}{\*\bkmkend sk66113944309}F: None\par  E: replete K>4 Mg>2\par  N: NPO\par  D: SCDs (concern for bleed)\par  Dispo: RMF.\par  \par  \par  }   {\rtf1\rhgchi61656\ansi\imakjbr9427\ftnbj\uc1\deff0  {\fonttbl{\f0 \fnil Segoe UI;}{\f1 \fnil \fcharset0 Segoe UI;}{\f2 \fnil Times [place];}}  {\colortbl ;\ccz101\uutgq335\rsme678 ;\red0\green0\blue0 ;\red0\green0\jbfi941 ;\red0\green0\blue0 ;}  {\stylesheet{\f0\fs20 Normal;}{\cs1 Default Paragraph Font;}{\cs2\f0\fs16 Line Number;}{\cs3\f2\fs24\ul\cf3 Hyperlink;}}  {\*\revtbl{Unknown;}}  \kbgimi20593\zctbpd99297\yxhlx5192\zqgnn9625\swsuv7504\amzbn3746\zgspfzt967\rteqqcw897\nogrowautofit\ukqkfd037\formshade\nofeaturethrottle1\dntblnsbdb\fet4\aendnotes\aftnnrlc\pgbrdrhead\pgbrdrfoot  \sectd\lahvxl35674\tncsfb18821\guttersxn0\zcwnaemc3722\gsrpvnyo6525\trzrbvky7501\doetpvwe8305\qalzhsz985\mkqixfb487\sbkpage\pgncont\pgndec  \plain\plain\f0\fs24\ql\plain\f0\fs24\plain\f0\fs20\xsmj8013\hich\f0\dbch\f0\loch\f0\fs20\par  I M\par  \par  73 y o F with pmhx of HTN, DVT on Xarelto, HTN, PAD, STARR who presents for 2 day history of fatigue and shortness of breath, admitted for symptomatic anemia, r/o UGIB.\par  \par  \plain\f1\fs20\koat5924\hich\f1\dbch\f1\loch\f1\cf2\fs20\ul{\field{\*\fldinst HYPERLINK [id number] }{\fldrslt Problem/Plan - 1:}}\plain\f0\fs20\wgvz5914\hich\f0\dbch\f0\loch\f0\fs20\ql\par  \'b7  {\*\bkmkstart vr05523807553}{\*\bkmkend na16890107559}Problem: {\*\bkmkstart ug09001085529}{\*\bkmkend dg33058739976}Anemia due to acute blood loss.\plain\f1\fs20\guan0645\hich\f1\dbch\f1\loch\f1\cf2\fs20\strike\plain\f0\fs20\etbi1976\hich\f0\dbch\f0\loch\f0\fs20\par  \'b7  {\*\bkmkstart tw74698950875}{\*\bkmkend vk23418182844}Plan: {\*\bkmkstart fr60924026628}{\*\bkmkend sx71194036778}Pt presenting after 10 day history of dark stool and 2 day history of mild dyspnea on exertion with fatigue. Reports hx of blood transfusions   in the past, last 2 years ago. 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 Also reports hx of hematemesis 2/2 NSAID use in 2016, requiring "emergent surgery" and blood transfusions. Per pt, no recent NSAID use,   significant alcohol use\par  -during previous admissions, baseline Hgb of 8-9, now presenting with Hgb of 6.2\par  -1u pRBC ordered, f/u post transfusion CBC (goal Hgb >7)\par  -maintain active T&S, 2 large bore IVs\par  -f/u iron studies\par  -GI consulted, f/u recommendations.\plain\f1\fs20\slmc0462\hich\f1\dbch\f1\loch\f1\cf2\fs20\strike\plain\f0\fs20\zxng6121\hich\f0\dbch\f0\loch\f0\fs20\par  \par  \plain\f1\fs20\lzde2073\hich\f1\dbch\f1\loch\f1\cf2\fs20\ul{\field{\*\fldinst HYPERLINK [id number] }{\fldrslt Problem/Plan - 2:}}\plain\f0\fs20\kaqd5682\hich\f0\dbch\f0\loch\f0\fs20\ql\par  \'b7  {\*\bkmkstart vl53961368994}{\*\bkmkend kn05336631463}Problem: {\*\bkmkstart ab74544379790}{\*\bkmkend ab58428116429}Upper GI bleeding.\plain\f1\fs20\rucf4837\hich\f1\dbch\f1\loch\f1\cf2\fs20\strike\plain\f0\fs20\bedm6155\hich\f0\dbch\f0\loch\f0\fs20\par  \'b7  {\*\bkmkstart lk51120824488}{\*\bkmkend xy79551469351}Plan: {\*\bkmkstart kf15885380483}{\*\bkmkend ed45701182929}Protonix IV BID \par  GI Consult , discussed with GI - Panning for EGD in am \par  NPO after midnight.\plain\f1\fs20\ctie5043\hich\f1\dbch\f1\loch\f1\cf2\fs20\strike\plain\f0\fs20\wfue4253\hich\f0\dbch\f0\loch\f0\fs20\par  \par  \plain\f1\fs20\cjib3529\hich\f1\dbch\f1\loch\f1\cf2\fs20\ul{\field{\*\fldinst HYPERLINK [id number] }{\fldrslt Problem/Plan - 3:}}\plain\f0\fs20\vpqe7268\hich\f0\dbch\f0\loch\f0\fs20\ql\par  \'b7  {\*\bkmkstart im67937607265}{\*\bkmkend tz68881156419}Problem: {\*\bkmkstart js16432574588}{\*\bkmkend ea50635352956}Hypertension.\plain\f1\fs20\ncfc7469\hich\f1\dbch\f1\loch\f1\cf2\fs20\strike\plain\f0\fs20\eqsm7676\hich\f0\dbch\f0\loch\f0\fs20\par  \'b7  {\*\bkmkstart xf73501870294}{\*\bkmkend eu66100523567}Plan: {\*\bkmkstart bs23667256105}{\*\bkmkend zv72951660053}Uses Lisinopril 40mg PO qD and Amlodipine 10mg PO qD at home\par  -c/w amlodipine 10mg PO qD\par  -restart Lisinopril if remains hypertensive.\plain\f1\fs20\lobw8871\hich\f1\dbch\f1\loch\f1\cf2\fs20\strike\plain\f0\fs20\gyie9613\hich\f0\dbch\f0\loch\f0\fs20\par  \par  \plain\f1\fs20\murs3988\hich\f1\dbch\f1\loch\f1\cf2\fs20\ul{\field{\*\fldinst HYPERLINK [id number] }{\fldrslt Problem/Plan - 4:}}\plain\f0\fs20\uygi8683\hich\f0\dbch\f0\loch\f0\fs20\ql\par  \'b7  {\*\bkmkstart nn91600433155}{\*\bkmkend mt67138713792}Problem: {\*\bkmkstart hp73951025308}{\*\bkmkend yf32850170355}Diabetes.\plain\f1\fs20\jefw3889\hich\f1\dbch\f1\loch\f1\cf2\fs20\strike\plain\f0\fs20\hnwq1045\hich\f0\dbch\f0\loch\f0\fs20\par  \'b7  {\*\bkmkstart ny02086313395}{\*\bkmkend oi78801914670}Plan: {\*\bkmkstart hv74714024446}{\*\bkmkend eb54156115346}uses Metformin 500mg PO qD at home\par  -mISS while inpatient.\plain\f1\fs20\auie9376\hich\f1\dbch\f1\loch\f1\cf2\fs20\strike\plain\f0\fs20\rdir0422\hich\f0\dbch\f0\loch\f0\fs20\par  \par  \plain\f1\fs20\ddei6289\hich\f1\dbch\f1\loch\f1\cf2\fs20\ul{\field{\*\fldinst HYPERLINK [id number] }{\fldrslt Problem/Plan - 5:}}\plain\f0\fs20\mutu2899\hich\f0\dbch\f0\loch\f0\fs20\ql\par  \'b7  {\*\bkmkstart gp32842439671}{\*\bkmkend so86509312251}Problem: {\*\bkmkstart cj14169260182}{\*\bkmkend qb33990764951}History of DVT in adulthood.\plain\f1\fs20\lzwl3993\hich\f1\dbch\f1\loch\f1\cf2\fs20\strike\plain\f0\fs20\tnpw8299\hich\f0\dbch\f0\loch\f0\fs20\par  \'b7  {\*\bkmkstart pm26978730805}{\*\bkmkend on13967584824}Plan: {\*\bkmkstart ku57796198756}{\*\bkmkend bv51483439930}pt reports hx of DVT many years ago, previously with IVC filter which has now been removed. Reports compliance with Xarelto\par  -last dose of Xarelto on 12/3\par  will hold at this time given concern for UGIB.\plain\f1\fs20\dbwj0419\hich\f1\dbch\f1\loch\f1\cf2\fs20\strike\plain\f0\fs20\zfpb7914\hich\f0\dbch\f0\loch\f0\fs20\par  \par  \plain\f1\fs20\xbnq8677\hich\f1\dbch\f1\loch\f1\cf2\fs20\ul{\field{\*\fldinst HYPERLINK [id number] }{\fldrslt Problem/Plan - 6:}}\plain\f0\fs20\xgnp8567\hich\f0\dbch\f0\loch\f0\fs20\ql\par  \'b7  {\*\bkmkstart db47631197341}{\*\bkmkend yv57280310119}Problem: {\*\bkmkstart tw38306368388}{\*\bkmkend ei03900006919}Prophylactic measure. \par  \'b7  {\*\bkmkstart ir07179237970}{\*\bkmkend vg21163115832}Plan: {\*\bkmkstart hx86221192712}{\*\bkmkend au11513629601}F: None\par  E: replete K>4 Mg>2\par  N: NPO\par  D: SCDs (concern for bleed)\par  Dispo: RMF.\par  \par  \par  }

## 2023-12-05 NOTE — PROGRESS NOTE ADULT - PROBLEM SELECTOR PLAN 2
Pt states stool has been dark for the past 10 days. Last BM sunday 12/03.   - Protonix IV 40mg BID   - GI Consult, as above - Panning for EGD Pt states stool has been dark for the past 10 days. Last BM sunday 12/03. Concerning for UGIB.   - Protonix IV 40mg BID   - GI Consult, as above - Planning for EGD  - keep NPO for now   - hold xarelto

## 2023-12-05 NOTE — PRE-ANESTHESIA EVALUATION ADULT - NSANTHPMHFT_GEN_ALL_CORE
Cardiac: Positive for HTN, PAD, DVT on Xarelto. Denies MI/Angina/Heart Failure, Arrhythmia, Murmur/Valvular Disorder. <4 METS  Pulmonary: Denies Asthma, COPD, CAROLYNE  Renal: Denies kidney dysfunction  Hepatic: Denies liver dysfunction  Gastrointestinal: Concern for melena. Denies GERD/IBS  Endocrine: Denies DM or thyroid dysfunction  Neurologic: Denies stroke/seizure disorder  Hematologic: Positive for Hgb 6.2 upon admission s/p multiple PRBC transfusions. Positive for xarelto use. Denies blood clotting disorder. History of hematemesis secondary to NSAID use in 2016, last PRBC transfusion 2-3 years ago.    PSH: Emergent abdominal surgery of unknown etiology secondary to hematemesis, 2016.  Section.

## 2023-12-06 ENCOUNTER — TRANSCRIPTION ENCOUNTER (OUTPATIENT)
Age: 73
End: 2023-12-06

## 2023-12-06 DIAGNOSIS — E83.42 HYPOMAGNESEMIA: ICD-10-CM

## 2023-12-06 DIAGNOSIS — E83.39 OTHER DISORDERS OF PHOSPHORUS METABOLISM: ICD-10-CM

## 2023-12-06 LAB
ALBUMIN SERPL ELPH-MCNC: 3.4 G/DL — SIGNIFICANT CHANGE UP (ref 3.3–5)
ALBUMIN SERPL ELPH-MCNC: 3.4 G/DL — SIGNIFICANT CHANGE UP (ref 3.3–5)
ALP SERPL-CCNC: 70 U/L — SIGNIFICANT CHANGE UP (ref 40–120)
ALP SERPL-CCNC: 70 U/L — SIGNIFICANT CHANGE UP (ref 40–120)
ALT FLD-CCNC: 12 U/L — SIGNIFICANT CHANGE UP (ref 10–45)
ALT FLD-CCNC: 12 U/L — SIGNIFICANT CHANGE UP (ref 10–45)
ANION GAP SERPL CALC-SCNC: 18 MMOL/L — HIGH (ref 5–17)
ANION GAP SERPL CALC-SCNC: 18 MMOL/L — HIGH (ref 5–17)
AST SERPL-CCNC: 24 U/L — SIGNIFICANT CHANGE UP (ref 10–40)
AST SERPL-CCNC: 24 U/L — SIGNIFICANT CHANGE UP (ref 10–40)
BASOPHILS # BLD AUTO: 0.01 K/UL — SIGNIFICANT CHANGE UP (ref 0–0.2)
BASOPHILS # BLD AUTO: 0.01 K/UL — SIGNIFICANT CHANGE UP (ref 0–0.2)
BASOPHILS NFR BLD AUTO: 0.2 % — SIGNIFICANT CHANGE UP (ref 0–2)
BASOPHILS NFR BLD AUTO: 0.2 % — SIGNIFICANT CHANGE UP (ref 0–2)
BILIRUB SERPL-MCNC: 0.2 MG/DL — SIGNIFICANT CHANGE UP (ref 0.2–1.2)
BILIRUB SERPL-MCNC: 0.2 MG/DL — SIGNIFICANT CHANGE UP (ref 0.2–1.2)
BUN SERPL-MCNC: 8 MG/DL — SIGNIFICANT CHANGE UP (ref 7–23)
BUN SERPL-MCNC: 8 MG/DL — SIGNIFICANT CHANGE UP (ref 7–23)
CALCIUM SERPL-MCNC: 8.4 MG/DL — SIGNIFICANT CHANGE UP (ref 8.4–10.5)
CALCIUM SERPL-MCNC: 8.4 MG/DL — SIGNIFICANT CHANGE UP (ref 8.4–10.5)
CHLORIDE SERPL-SCNC: 108 MMOL/L — SIGNIFICANT CHANGE UP (ref 96–108)
CHLORIDE SERPL-SCNC: 108 MMOL/L — SIGNIFICANT CHANGE UP (ref 96–108)
CO2 SERPL-SCNC: 17 MMOL/L — LOW (ref 22–31)
CO2 SERPL-SCNC: 17 MMOL/L — LOW (ref 22–31)
CREAT SERPL-MCNC: 0.59 MG/DL — SIGNIFICANT CHANGE UP (ref 0.5–1.3)
CREAT SERPL-MCNC: 0.59 MG/DL — SIGNIFICANT CHANGE UP (ref 0.5–1.3)
EGFR: 96 ML/MIN/1.73M2 — SIGNIFICANT CHANGE UP
EGFR: 96 ML/MIN/1.73M2 — SIGNIFICANT CHANGE UP
EOSINOPHIL # BLD AUTO: 0.12 K/UL — SIGNIFICANT CHANGE UP (ref 0–0.5)
EOSINOPHIL # BLD AUTO: 0.12 K/UL — SIGNIFICANT CHANGE UP (ref 0–0.5)
EOSINOPHIL NFR BLD AUTO: 2 % — SIGNIFICANT CHANGE UP (ref 0–6)
EOSINOPHIL NFR BLD AUTO: 2 % — SIGNIFICANT CHANGE UP (ref 0–6)
GLUCOSE BLDC GLUCOMTR-MCNC: 113 MG/DL — HIGH (ref 70–99)
GLUCOSE BLDC GLUCOMTR-MCNC: 113 MG/DL — HIGH (ref 70–99)
GLUCOSE SERPL-MCNC: 76 MG/DL — SIGNIFICANT CHANGE UP (ref 70–99)
GLUCOSE SERPL-MCNC: 76 MG/DL — SIGNIFICANT CHANGE UP (ref 70–99)
HCT VFR BLD CALC: 27 % — LOW (ref 34.5–45)
HCT VFR BLD CALC: 27 % — LOW (ref 34.5–45)
HGB BLD-MCNC: 8.5 G/DL — LOW (ref 11.5–15.5)
HGB BLD-MCNC: 8.5 G/DL — LOW (ref 11.5–15.5)
IMM GRANULOCYTES NFR BLD AUTO: 0.3 % — SIGNIFICANT CHANGE UP (ref 0–0.9)
IMM GRANULOCYTES NFR BLD AUTO: 0.3 % — SIGNIFICANT CHANGE UP (ref 0–0.9)
LYMPHOCYTES # BLD AUTO: 1.4 K/UL — SIGNIFICANT CHANGE UP (ref 1–3.3)
LYMPHOCYTES # BLD AUTO: 1.4 K/UL — SIGNIFICANT CHANGE UP (ref 1–3.3)
LYMPHOCYTES # BLD AUTO: 23.3 % — SIGNIFICANT CHANGE UP (ref 13–44)
LYMPHOCYTES # BLD AUTO: 23.3 % — SIGNIFICANT CHANGE UP (ref 13–44)
MAGNESIUM SERPL-MCNC: 1.7 MG/DL — SIGNIFICANT CHANGE UP (ref 1.6–2.6)
MAGNESIUM SERPL-MCNC: 1.7 MG/DL — SIGNIFICANT CHANGE UP (ref 1.6–2.6)
MCHC RBC-ENTMCNC: 24.1 PG — LOW (ref 27–34)
MCHC RBC-ENTMCNC: 24.1 PG — LOW (ref 27–34)
MCHC RBC-ENTMCNC: 31.5 GM/DL — LOW (ref 32–36)
MCHC RBC-ENTMCNC: 31.5 GM/DL — LOW (ref 32–36)
MCV RBC AUTO: 76.7 FL — LOW (ref 80–100)
MCV RBC AUTO: 76.7 FL — LOW (ref 80–100)
MONOCYTES # BLD AUTO: 0.53 K/UL — SIGNIFICANT CHANGE UP (ref 0–0.9)
MONOCYTES # BLD AUTO: 0.53 K/UL — SIGNIFICANT CHANGE UP (ref 0–0.9)
MONOCYTES NFR BLD AUTO: 8.8 % — SIGNIFICANT CHANGE UP (ref 2–14)
MONOCYTES NFR BLD AUTO: 8.8 % — SIGNIFICANT CHANGE UP (ref 2–14)
NEUTROPHILS # BLD AUTO: 3.92 K/UL — SIGNIFICANT CHANGE UP (ref 1.8–7.4)
NEUTROPHILS # BLD AUTO: 3.92 K/UL — SIGNIFICANT CHANGE UP (ref 1.8–7.4)
NEUTROPHILS NFR BLD AUTO: 65.4 % — SIGNIFICANT CHANGE UP (ref 43–77)
NEUTROPHILS NFR BLD AUTO: 65.4 % — SIGNIFICANT CHANGE UP (ref 43–77)
NRBC # BLD: 0 /100 WBCS — SIGNIFICANT CHANGE UP (ref 0–0)
NRBC # BLD: 0 /100 WBCS — SIGNIFICANT CHANGE UP (ref 0–0)
PHOSPHATE SERPL-MCNC: 2.5 MG/DL — SIGNIFICANT CHANGE UP (ref 2.5–4.5)
PHOSPHATE SERPL-MCNC: 2.5 MG/DL — SIGNIFICANT CHANGE UP (ref 2.5–4.5)
PLATELET # BLD AUTO: 207 K/UL — SIGNIFICANT CHANGE UP (ref 150–400)
PLATELET # BLD AUTO: 207 K/UL — SIGNIFICANT CHANGE UP (ref 150–400)
POTASSIUM SERPL-MCNC: 4.4 MMOL/L — SIGNIFICANT CHANGE UP (ref 3.5–5.3)
POTASSIUM SERPL-MCNC: 4.4 MMOL/L — SIGNIFICANT CHANGE UP (ref 3.5–5.3)
POTASSIUM SERPL-SCNC: 4.4 MMOL/L — SIGNIFICANT CHANGE UP (ref 3.5–5.3)
POTASSIUM SERPL-SCNC: 4.4 MMOL/L — SIGNIFICANT CHANGE UP (ref 3.5–5.3)
PROT SERPL-MCNC: 7.1 G/DL — SIGNIFICANT CHANGE UP (ref 6–8.3)
PROT SERPL-MCNC: 7.1 G/DL — SIGNIFICANT CHANGE UP (ref 6–8.3)
RBC # BLD: 3.52 M/UL — LOW (ref 3.8–5.2)
RBC # BLD: 3.52 M/UL — LOW (ref 3.8–5.2)
RBC # FLD: 19.5 % — HIGH (ref 10.3–14.5)
RBC # FLD: 19.5 % — HIGH (ref 10.3–14.5)
SODIUM SERPL-SCNC: 143 MMOL/L — SIGNIFICANT CHANGE UP (ref 135–145)
SODIUM SERPL-SCNC: 143 MMOL/L — SIGNIFICANT CHANGE UP (ref 135–145)
WBC # BLD: 6 K/UL — SIGNIFICANT CHANGE UP (ref 3.8–10.5)
WBC # BLD: 6 K/UL — SIGNIFICANT CHANGE UP (ref 3.8–10.5)
WBC # FLD AUTO: 6 K/UL — SIGNIFICANT CHANGE UP (ref 3.8–10.5)
WBC # FLD AUTO: 6 K/UL — SIGNIFICANT CHANGE UP (ref 3.8–10.5)

## 2023-12-06 PROCEDURE — 45378 DIAGNOSTIC COLONOSCOPY: CPT | Mod: GC

## 2023-12-06 PROCEDURE — 99233 SBSQ HOSP IP/OBS HIGH 50: CPT | Mod: GC

## 2023-12-06 RX ORDER — MAGNESIUM SULFATE 500 MG/ML
1 VIAL (ML) INJECTION ONCE
Refills: 0 | Status: COMPLETED | OUTPATIENT
Start: 2023-12-06 | End: 2023-12-06

## 2023-12-06 RX ADMIN — PANTOPRAZOLE SODIUM 40 MILLIGRAM(S): 20 TABLET, DELAYED RELEASE ORAL at 10:03

## 2023-12-06 RX ADMIN — PANTOPRAZOLE SODIUM 40 MILLIGRAM(S): 20 TABLET, DELAYED RELEASE ORAL at 21:48

## 2023-12-06 RX ADMIN — Medication 100 GRAM(S): at 18:07

## 2023-12-06 RX ADMIN — IRON SUCROSE 110 MILLIGRAM(S): 20 INJECTION, SOLUTION INTRAVENOUS at 21:49

## 2023-12-06 RX ADMIN — TIOTROPIUM BROMIDE 2 PUFF(S): 18 CAPSULE ORAL; RESPIRATORY (INHALATION) at 10:02

## 2023-12-06 RX ADMIN — BUDESONIDE AND FORMOTEROL FUMARATE DIHYDRATE 2 PUFF(S): 160; 4.5 AEROSOL RESPIRATORY (INHALATION) at 22:13

## 2023-12-06 RX ADMIN — AMLODIPINE BESYLATE 10 MILLIGRAM(S): 2.5 TABLET ORAL at 18:07

## 2023-12-06 RX ADMIN — BUDESONIDE AND FORMOTEROL FUMARATE DIHYDRATE 2 PUFF(S): 160; 4.5 AEROSOL RESPIRATORY (INHALATION) at 10:02

## 2023-12-06 NOTE — PROGRESS NOTE ADULT - SUBJECTIVE AND OBJECTIVE BOX
O/N Events:  None    Subjective/ROS: Patient seen and examined at bedside. Resting comfortably in bed. Finished colonoscopy prep, states stool is a clear light gray.   Denies Fever/Chills, HA, CP, SOB, n/v, changes in urinary habits.      VITALS  T(C): 36.7 (06 Dec 2023 05:27), Max: 36.8 (05 Dec 2023 12:00)  T(F): 98.1 (06 Dec 2023 05:27), Max: 98.2 (05 Dec 2023 12:00)  HR: 70 (06 Dec 2023 05:27) (62 - 70)  BP: 130/61 (06 Dec 2023 05:27) (130/61 - 163/67)  BP(mean): 84 (06 Dec 2023 05:27) (84 - 84)  RR: 18 (06 Dec 2023 05:27) (17 - 18)  SpO2: 100% (06 Dec 2023 05:27) (96% - 100%)    Parameters below as of 06 Dec 2023 05:27  Patient On (Oxygen Delivery Method): room air    CAPILLARY BLOOD GLUCOSE  POCT Blood Glucose.: 119 mg/dL (05 Dec 2023 13:59)    PHYSICAL EXAM  General: NAD  Head: NC/AT; MMM; PERRL  Neck: Supple; no JVD  Respiratory: CTAB; no wheezes/rales/rhonchi  Cardiovascular: Regular rhythm/rate; S1/S2+, no murmurs, rubs gallops   Gastrointestinal: Soft; NTND; bowel sounds normal and present  Extremities: WWP; no edema/cyanosis  Neurological: A&Ox3, CNII-XII grossly intact; no obvious focal deficits    Antimicrobials:  MEDICATIONS  (STANDING):    Standing Medications:  MEDICATIONS  (STANDING):  amLODIPine   Tablet 10 milliGRAM(s) Oral every 24 hours  budesonide  80 MICROgram(s)/formoterol 4.5 MICROgram(s) Inhaler 2 Puff(s) Inhalation two times a day  influenza  Vaccine (HIGH DOSE) 0.7 milliLiter(s) IntraMuscular once  iron sucrose IVPB 200 milliGRAM(s) IV Intermittent every 24 hours  pantoprazole  Injectable 40 milliGRAM(s) IV Push every 12 hours  tiotropium 2.5 MICROgram(s) Inhaler 2 Puff(s) Inhalation daily      PRN Medications:  MEDICATIONS  (PRN):      penicillin (Unknown)  ibuprofen (Unknown)      LABS             8.2    6.14  )-----------( 200      ( 05 Dec 2023 16:40 )             25.8     12-05    139  |  107  |  14  ----------------------------<  95  4.0   |  24  |  0.71    Ca    8.4      05 Dec 2023 05:30  Phos  2.8     12-05  Mg     1.7     12-05    TPro  6.7  /  Alb  3.5  /  TBili  0.3  /  DBili  x   /  AST  16  /  ALT  9<L>  /  AlkPhos  72  12-05    PT/INR - ( 05 Dec 2023 05:30 )   PT: 12.1 sec;   INR: 1.06     PTT - ( 05 Dec 2023 05:30 )  PTT:26.6 sec  Urinalysis Basic - ( 05 Dec 2023 05:30 )    Color: x / Appearance: x / SG: x / pH: x  Gluc: 95 mg/dL / Ketone: x  / Bili: x / Urobili: x   Blood: x / Protein: x / Nitrite: x   Leuk Esterase: x / RBC: x / WBC x   Sq Epi: x / Non Sq Epi: x / Bacteria: x              IMAGING/EKG/ETC   O/N Events:  No acute overnight events. Completed the entire Golytely prep.     Subjective/ROS: Patient seen and examined at bedside. Resting comfortably in bed. Finished colonoscopy prep, states stool is a clear light gray. Denies Fever/Chills, HA, CP, SOB, n/v, changes in urinary habits.      VITALS  T(C): 36.7 (06 Dec 2023 05:27), Max: 36.8 (05 Dec 2023 12:00)  T(F): 98.1 (06 Dec 2023 05:27), Max: 98.2 (05 Dec 2023 12:00)  HR: 70 (06 Dec 2023 05:27) (62 - 70)  BP: 130/61 (06 Dec 2023 05:27) (130/61 - 163/67)  BP(mean): 84 (06 Dec 2023 05:27) (84 - 84)  RR: 18 (06 Dec 2023 05:27) (17 - 18)  SpO2: 100% (06 Dec 2023 05:27) (96% - 100%)    Parameters below as of 06 Dec 2023 05:27  Patient On (Oxygen Delivery Method): room air    CAPILLARY BLOOD GLUCOSE  POCT Blood Glucose.: 119 mg/dL (05 Dec 2023 13:59)    PHYSICAL EXAM  General: NAD  Head: NC/AT; MMM; PERRL  Neck: Supple; no JVD  Respiratory: CTAB; no wheezes/rales/rhonchi  Cardiovascular: Regular rhythm/rate; S1/S2+, no murmurs, rubs gallops   Gastrointestinal: Soft; NTND; bowel sounds normal and present  Extremities: WWP; no edema/cyanosis  Neurological: A&Ox3, CNII-XII grossly intact; no obvious focal deficits    Antimicrobials:  MEDICATIONS  (STANDING):    Standing Medications:  MEDICATIONS  (STANDING):  amLODIPine   Tablet 10 milliGRAM(s) Oral every 24 hours  budesonide  80 MICROgram(s)/formoterol 4.5 MICROgram(s) Inhaler 2 Puff(s) Inhalation two times a day  influenza  Vaccine (HIGH DOSE) 0.7 milliLiter(s) IntraMuscular once  iron sucrose IVPB 200 milliGRAM(s) IV Intermittent every 24 hours  pantoprazole  Injectable 40 milliGRAM(s) IV Push every 12 hours  tiotropium 2.5 MICROgram(s) Inhaler 2 Puff(s) Inhalation daily      PRN Medications:  MEDICATIONS  (PRN):      penicillin (Unknown)  ibuprofen (Unknown)      LABS             8.2    6.14  )-----------( 200      ( 05 Dec 2023 16:40 )             25.8     12-05    139  |  107  |  14  ----------------------------<  95  4.0   |  24  |  0.71    Ca    8.4      05 Dec 2023 05:30  Phos  2.8     12-05  Mg     1.7     12-05    TPro  6.7  /  Alb  3.5  /  TBili  0.3  /  DBili  x   /  AST  16  /  ALT  9<L>  /  AlkPhos  72  12-05    PT/INR - ( 05 Dec 2023 05:30 )   PT: 12.1 sec;   INR: 1.06     PTT - ( 05 Dec 2023 05:30 )  PTT:26.6 sec  Urinalysis Basic - ( 05 Dec 2023 05:30 )    Color: x / Appearance: x / SG: x / pH: x  Gluc: 95 mg/dL / Ketone: x  / Bili: x / Urobili: x   Blood: x / Protein: x / Nitrite: x   Leuk Esterase: x / RBC: x / WBC x   Sq Epi: x / Non Sq Epi: x / Bacteria: x              IMAGING/EKG/ETC

## 2023-12-06 NOTE — PROGRESS NOTE ADULT - PROBLEM SELECTOR PLAN 7
Mg of 1.7 on 12/6.  - Repleted  - F/u AM Mg level F: None  E: replete K>4 Mg>2  N: NPO  D: SCDs (concern for bleed)  Dispo: F

## 2023-12-06 NOTE — CHART NOTE - NSCHARTNOTEFT_GEN_A_CORE
Colonoscopy performed in endo suite for melena and STARR.    Impressions:  - Brown stool in the whole colon.  - A faintly erythematous patch was seen in the cecum, possibly suggestive of angioectasia.    Plan:	  - Advance diet as tolerated  - Consider colonoscopy with 2-day prep and VCE, which can be done outpatient if Hgb remains stable  - No GI contraindication but would weigh risks vs benefits of ongoing anticoagulation  - Pt should have outpatient GI follow-up at [address] [place] [address], [address], [place] [address] (phone: [phone]). Please include this information in DC paperwork.    Bob (Jaja) MD Chris  Gastroenterology Fellow  Pager (M-F 7a-5p): [phone]  Pager (after hours): Please call  for on-call fellow Colonoscopy performed in endo suite for melena and STARR.    Impressions:  - Brown stool in the whole colon.  - A faintly erythematous patch was seen in the cecum, possibly suggestive of angioectasia.    Plan:	  - Advance diet as tolerated  - Consider colonoscopy with 2-day prep and VCE, which can be done outpatient if Hgb remains stable  - No GI contraindication but would weigh risks vs benefits of ongoing anticoagulation  - Pt should have outpatient GI follow-up at [address] [place] [address], [address], [place] [address] (phone: [phone]). Please include this information in DC paperwork.    Bob (Jaja) MD Chris  Gastroenterology Fellow  Pager (M-F 7a-5p): [phone]  Pager (after hours): Please call  for on-call fellow

## 2023-12-06 NOTE — PROGRESS NOTE ADULT - PROBLEM SELECTOR PLAN 4
uses Metformin 500mg PO qD at home  -mISS while inpatient Uses Metformin 500mg PO qD at home.   - C/w mISS while inpatient  - Continue holding home metformin while inpatient

## 2023-12-06 NOTE — PROGRESS NOTE ADULT - PROBLEM SELECTOR PLAN 6
F: None  E: replete K>4 Mg>2  N: NPO  D: SCDs (concern for bleed)  Dispo: F Phos of 2.5 on 12/6.  - Repleted  - F/u AM phos level Mg of 1.7 on 12/6.  - Repleted  - F/u AM Mg level

## 2023-12-06 NOTE — PROGRESS NOTE ADULT - PROBLEM SELECTOR PLAN 3
Uses Lisinopril 40mg PO qD and Amlodipine 10mg PO qD at home  -c/w amlodipine 10mg PO qD  -restart Lisinopril if hypertensive Uses Lisinopril 40mg PO qD and Amlodipine 10mg PO qD at home. BP well-controlled on 12/6.   - C/w amlodipine 10mg PO qD  - Consider restarting Lisinopril if hypertensive

## 2023-12-06 NOTE — PROGRESS NOTE ADULT - ATTENDING COMMENTS
73-year-old female with a PMHx of HTN, PAD, STARR and DVT (on Xarelto, s/p IVC filter that is now removed) who presented with melena and symptomatic anemia.      #UGIB   #Symptomatic Iron Deficiency Anemia    -GI consulted, EGD without evidence of bleed and no explanation for melena, plan for colonoscopy today, f/u results   -continue with IV Iron x 3 days    -continue with IV PPI BID    -follow up H. pylori    -hold Xarelto pending results of colonoscopy     #DVT   -unclear history whether provoked or unprovoked, had IVC filter at some point that is now removed  -states she had the blood clot "removed" earlier this year but unable to provide additional information   -follow with "blood doctor", Dr. Baumann ([phone] or [phone]) but these numbers are for a colorectal surgeon and PMR office?  -will attempt to obtain more collateral  -pending results of colonoscopy and collateral information, will need to discuss resumption of Xarelto vs. IVC filter vs. monitoring off AC    DVT PPx: held     Dispo: pending colonoscopy 73-year-old female with a PMHx of HTN, PAD, STARR and DVT (on Xarelto, s/p IVC filter that is now removed) who presented with melena and symptomatic anemia.      #UGIB   #Symptomatic Iron Deficiency Anemia    -GI consulted, EGD without evidence of bleed and no explanation for melena, plan for colonoscopy today, f/u results   -continue with IV Iron x 3 days    -continue with IV PPI BID    -follow up H. pylori    -hold Xarelto pending results of colonoscopy     #DVT   -unclear history whether provoked or unprovoked, had IVC filter at some point that is now removed  -states she had the blood clot "removed" earlier this year but unable to provide additional information   -follow with "blood doctor", Dr. Baumann ([phone] or [phone]) but these numbers are for a colorectal surgeon and PMR office?  -will attempt to obtain more collateral  -pending results of colonoscopy and collateral information, will need to discuss resumption of Xarelto vs. IVC filter vs. monitoring off AC    DVT PPx: held     Dispo: pending colonoscopy

## 2023-12-06 NOTE — PROGRESS NOTE ADULT - PROBLEM SELECTOR PLAN 1
Pt presenting after 10 day history of dark stool and 2 day history of mild dyspnea on exertion with fatigue. Reports hx of blood transfusions in the past, last 2 years ago. 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 Also reports hx of hematemesis 2/2 NSAID use in 2016, requiring "emergent surgery" and blood transfusions. Per pt, no recent NSAID use, significant alcohol use. Denies smoking or other drug use.  Baseline Hgb of 8.5, presented with Hgb of 6.2. Iron studies/MCV pointing toward STARR.  EGD 12/5 negative for source of bleeding.  - Hgb trending up s/p transfusion 1 unit: 7.9 --> 8.2  - GI consulted: Plan for cscope 12/6  - Start Iron IV 200mg 12/4-12/7 course   - Monitor CBC (goal Hgb >7, transfuse if below)  - Maintain active T&S, 2 large bore IVs Pt presenting after 10-day history of melena and Hb of 6.2. S/p 2U pRBCs. Iron studies and MCV consistent with STARR. Started iron infusion on 12/4. Hb of 8.5 on 12/6, stable. EGD 12/5 negative for source of bleeding.  - Following GI recs: pending colonoscopy on 12/6  - C/w Iron 200mg IV qd (12/4-12/7 course)   - Monitor CBC (goal Hgb >7, transfuse if below)  - Maintain active T&S, 2 large bore IVs

## 2023-12-06 NOTE — PROGRESS NOTE ADULT - PROBLEM SELECTOR PLAN 5
Pt reports hx of DVT many years ago, previously with IVC filter which has now been removed. Most recent DVT was in 02/2023. Reports compliance with Xarelto  - Last dose of Xarelto on 12/3  - Hold AC at this time given concern for UGIB, get recs from GI regarding when to restart AC Pt reports hx of DVT many years ago, previously with IVC filter which has now been removed. Most recent DVT was in 02/2023. Reports compliance with Xarelto. Last dose of Xarelto on 12/3.   - Continue holding xarelto iso GIB (consider IVC filter vs. resuming xarelto, pending GI recs)

## 2023-12-06 NOTE — PROGRESS NOTE ADULT - PROBLEM SELECTOR PLAN 2
Pt states stool has been dark for the past 10 days. Last BM sunday 12/03. Concerning for UGIB.   - Protonix IV 40mg BID   - GI Consult, as above - EGD negative  - hold xarelto On admission, pt presented with 10-day history of melena. EGD on 12/5 negative for source of bleeding.   - C/w Protonix 40mg IV BID   - Following GI recs: pending colonoscopy on 12/6  - Continue holding xarelto iso GIB (consider IVC filter vs. resuming xarelto, pending GI recs) On admission, pt presented with 10-day history of melena. EGD on 12/5 negative for source of bleeding.   - C/w Protonix 40mg IV BID   - F/u H. pylori stool antigen   - Following GI recs: pending colonoscopy on 12/6  - Continue holding xarelto iso GIB (consider IVC filter vs. resuming xarelto, pending GI recs)

## 2023-12-06 NOTE — PRE-ANESTHESIA EVALUATION ADULT - NSANTHOSAYNRD_GEN_A_CORE
No. CAROLYNE screening performed.  STOP BANG Legend: 0-2 = LOW Risk; 3-4 = INTERMEDIATE Risk; 5-8 = HIGH Risk
No. CAROLYNE screening performed.  STOP BANG Legend: 0-2 = LOW Risk; 3-4 = INTERMEDIATE Risk; 5-8 = HIGH Risk

## 2023-12-06 NOTE — PROGRESS NOTE ADULT - ASSESSMENT
73F with pmhx of HTN, 2 episodes of DVT (most recent in 02/2023) on Xarelto, HTN, PAD, STARR who presents for 2 day history of fatigue and shortness of breath, admitted for symptomatic anemia, c/f GI bleed. EGD 12/5 negative. Plan for colonoscopy 12/6.  73F with PMH of HTN, 2 episodes of DVT (most recent in 02/2023) on Xarelto, HTN, PAD, STARR who presents for 2-day history of fatigue and shortness of breath, admitted for symptomatic anemia, c/f GI bleed. EGD 12/5 negative. Plan for colonoscopy 12/6.

## 2023-12-07 VITALS
DIASTOLIC BLOOD PRESSURE: 80 MMHG | SYSTOLIC BLOOD PRESSURE: 169 MMHG | RESPIRATION RATE: 17 BRPM | TEMPERATURE: 98 F | OXYGEN SATURATION: 100 % | HEART RATE: 72 BPM

## 2023-12-07 LAB
ALBUMIN SERPL ELPH-MCNC: 3.7 G/DL — SIGNIFICANT CHANGE UP (ref 3.3–5)
ALBUMIN SERPL ELPH-MCNC: 3.7 G/DL — SIGNIFICANT CHANGE UP (ref 3.3–5)
ALP SERPL-CCNC: 80 U/L — SIGNIFICANT CHANGE UP (ref 40–120)
ALP SERPL-CCNC: 80 U/L — SIGNIFICANT CHANGE UP (ref 40–120)
ALT FLD-CCNC: 8 U/L — LOW (ref 10–45)
ALT FLD-CCNC: 8 U/L — LOW (ref 10–45)
ANION GAP SERPL CALC-SCNC: 7 MMOL/L — SIGNIFICANT CHANGE UP (ref 5–17)
ANION GAP SERPL CALC-SCNC: 7 MMOL/L — SIGNIFICANT CHANGE UP (ref 5–17)
AST SERPL-CCNC: 14 U/L — SIGNIFICANT CHANGE UP (ref 10–40)
AST SERPL-CCNC: 14 U/L — SIGNIFICANT CHANGE UP (ref 10–40)
BILIRUB SERPL-MCNC: 0.2 MG/DL — SIGNIFICANT CHANGE UP (ref 0.2–1.2)
BILIRUB SERPL-MCNC: 0.2 MG/DL — SIGNIFICANT CHANGE UP (ref 0.2–1.2)
BLD GP AB SCN SERPL QL: NEGATIVE — SIGNIFICANT CHANGE UP
BLD GP AB SCN SERPL QL: NEGATIVE — SIGNIFICANT CHANGE UP
BUN SERPL-MCNC: 7 MG/DL — SIGNIFICANT CHANGE UP (ref 7–23)
BUN SERPL-MCNC: 7 MG/DL — SIGNIFICANT CHANGE UP (ref 7–23)
CALCIUM SERPL-MCNC: 8.8 MG/DL — SIGNIFICANT CHANGE UP (ref 8.4–10.5)
CALCIUM SERPL-MCNC: 8.8 MG/DL — SIGNIFICANT CHANGE UP (ref 8.4–10.5)
CHLORIDE SERPL-SCNC: 106 MMOL/L — SIGNIFICANT CHANGE UP (ref 96–108)
CHLORIDE SERPL-SCNC: 106 MMOL/L — SIGNIFICANT CHANGE UP (ref 96–108)
CO2 SERPL-SCNC: 25 MMOL/L — SIGNIFICANT CHANGE UP (ref 22–31)
CO2 SERPL-SCNC: 25 MMOL/L — SIGNIFICANT CHANGE UP (ref 22–31)
CREAT SERPL-MCNC: 0.71 MG/DL — SIGNIFICANT CHANGE UP (ref 0.5–1.3)
CREAT SERPL-MCNC: 0.71 MG/DL — SIGNIFICANT CHANGE UP (ref 0.5–1.3)
EGFR: 90 ML/MIN/1.73M2 — SIGNIFICANT CHANGE UP
EGFR: 90 ML/MIN/1.73M2 — SIGNIFICANT CHANGE UP
GLUCOSE SERPL-MCNC: 101 MG/DL — HIGH (ref 70–99)
GLUCOSE SERPL-MCNC: 101 MG/DL — HIGH (ref 70–99)
HCT VFR BLD CALC: 26.9 % — LOW (ref 34.5–45)
HCT VFR BLD CALC: 26.9 % — LOW (ref 34.5–45)
HGB BLD-MCNC: 8.4 G/DL — LOW (ref 11.5–15.5)
HGB BLD-MCNC: 8.4 G/DL — LOW (ref 11.5–15.5)
MAGNESIUM SERPL-MCNC: 1.9 MG/DL — SIGNIFICANT CHANGE UP (ref 1.6–2.6)
MAGNESIUM SERPL-MCNC: 1.9 MG/DL — SIGNIFICANT CHANGE UP (ref 1.6–2.6)
MCHC RBC-ENTMCNC: 24.2 PG — LOW (ref 27–34)
MCHC RBC-ENTMCNC: 24.2 PG — LOW (ref 27–34)
MCHC RBC-ENTMCNC: 31.2 GM/DL — LOW (ref 32–36)
MCHC RBC-ENTMCNC: 31.2 GM/DL — LOW (ref 32–36)
MCV RBC AUTO: 77.5 FL — LOW (ref 80–100)
MCV RBC AUTO: 77.5 FL — LOW (ref 80–100)
NRBC # BLD: 0 /100 WBCS — SIGNIFICANT CHANGE UP (ref 0–0)
NRBC # BLD: 0 /100 WBCS — SIGNIFICANT CHANGE UP (ref 0–0)
PHOSPHATE SERPL-MCNC: 3.1 MG/DL — SIGNIFICANT CHANGE UP (ref 2.5–4.5)
PHOSPHATE SERPL-MCNC: 3.1 MG/DL — SIGNIFICANT CHANGE UP (ref 2.5–4.5)
PLATELET # BLD AUTO: 213 K/UL — SIGNIFICANT CHANGE UP (ref 150–400)
PLATELET # BLD AUTO: 213 K/UL — SIGNIFICANT CHANGE UP (ref 150–400)
POTASSIUM SERPL-MCNC: 4.2 MMOL/L — SIGNIFICANT CHANGE UP (ref 3.5–5.3)
POTASSIUM SERPL-MCNC: 4.2 MMOL/L — SIGNIFICANT CHANGE UP (ref 3.5–5.3)
POTASSIUM SERPL-SCNC: 4.2 MMOL/L — SIGNIFICANT CHANGE UP (ref 3.5–5.3)
POTASSIUM SERPL-SCNC: 4.2 MMOL/L — SIGNIFICANT CHANGE UP (ref 3.5–5.3)
PROT SERPL-MCNC: 7.4 G/DL — SIGNIFICANT CHANGE UP (ref 6–8.3)
PROT SERPL-MCNC: 7.4 G/DL — SIGNIFICANT CHANGE UP (ref 6–8.3)
RBC # BLD: 3.47 M/UL — LOW (ref 3.8–5.2)
RBC # BLD: 3.47 M/UL — LOW (ref 3.8–5.2)
RBC # FLD: 20 % — HIGH (ref 10.3–14.5)
RBC # FLD: 20 % — HIGH (ref 10.3–14.5)
RH IG SCN BLD-IMP: POSITIVE — SIGNIFICANT CHANGE UP
RH IG SCN BLD-IMP: POSITIVE — SIGNIFICANT CHANGE UP
SODIUM SERPL-SCNC: 138 MMOL/L — SIGNIFICANT CHANGE UP (ref 135–145)
SODIUM SERPL-SCNC: 138 MMOL/L — SIGNIFICANT CHANGE UP (ref 135–145)
WBC # BLD: 6.43 K/UL — SIGNIFICANT CHANGE UP (ref 3.8–10.5)
WBC # BLD: 6.43 K/UL — SIGNIFICANT CHANGE UP (ref 3.8–10.5)
WBC # FLD AUTO: 6.43 K/UL — SIGNIFICANT CHANGE UP (ref 3.8–10.5)
WBC # FLD AUTO: 6.43 K/UL — SIGNIFICANT CHANGE UP (ref 3.8–10.5)

## 2023-12-07 PROCEDURE — 99233 SBSQ HOSP IP/OBS HIGH 50: CPT | Mod: GC

## 2023-12-07 RX ORDER — RIVAROXABAN 15 MG-20MG
20 KIT ORAL
Refills: 0 | Status: DISCONTINUED | OUTPATIENT
Start: 2023-12-07 | End: 2023-12-08

## 2023-12-07 RX ORDER — IRON SUCROSE 20 MG/ML
200 INJECTION, SOLUTION INTRAVENOUS ONCE
Refills: 0 | Status: COMPLETED | OUTPATIENT
Start: 2023-12-07 | End: 2023-12-07

## 2023-12-07 RX ORDER — RIVAROXABAN 15 MG-20MG
10 KIT ORAL
Refills: 0 | Status: DISCONTINUED | OUTPATIENT
Start: 2023-12-07 | End: 2023-12-07

## 2023-12-07 RX ADMIN — RIVAROXABAN 20 MILLIGRAM(S): KIT at 18:01

## 2023-12-07 RX ADMIN — BUDESONIDE AND FORMOTEROL FUMARATE DIHYDRATE 2 PUFF(S): 160; 4.5 AEROSOL RESPIRATORY (INHALATION) at 09:04

## 2023-12-07 RX ADMIN — AMLODIPINE BESYLATE 10 MILLIGRAM(S): 2.5 TABLET ORAL at 18:01

## 2023-12-07 RX ADMIN — BUDESONIDE AND FORMOTEROL FUMARATE DIHYDRATE 2 PUFF(S): 160; 4.5 AEROSOL RESPIRATORY (INHALATION) at 22:25

## 2023-12-07 RX ADMIN — TIOTROPIUM BROMIDE 2 PUFF(S): 18 CAPSULE ORAL; RESPIRATORY (INHALATION) at 09:03

## 2023-12-07 NOTE — PROGRESS NOTE ADULT - ASSESSMENT
73F with PMH of HTN, 2 episodes of DVT (most recent in 02/2023) on Xarelto, HTN, PAD, STARR who presents for 2-day history of fatigue and shortness of breath, admitted for symptomatic anemia, c/f GI bleed. EGD 12/5 negative. Colonoscopy 12/6 showed stool throughout the colon and possible angioectasia in cecum. 73F with PMH of HTN, 2 episodes of DVT (most recent in 02/2023) on Xarelto, HTN, PAD, STARR who presents for 2-day history of fatigue and shortness of breath, admitted for symptomatic anemia, c/f GI bleed. EGD 12/5 negative. Colonoscopy 12/6 showed stool throughout the colon and possible angioectasia in cecum. Restarted home Xarelto 20mg PO qhs on 12/7, will monitor for 1 day for signs/symptoms of active bleed.

## 2023-12-07 NOTE — PROGRESS NOTE ADULT - ATTENDING COMMENTS
73-year-old female with a PMHx of HTN, PAD, STARR and DVT (on Xarelto, s/p IVC filter that is now removed) who presented with melena and symptomatic anemia.       #Concern for UGIB  #Symptomatic Iron Deficiency Anemia     -GI consulted, EGD without evidence of bleed and colonoscopy grossly unremarkable (one area of possible angioectasia)   -continue with IV Iron x 3 days then transition to PO ferrous sulfate q48hrs upon discharge   -follow up H. pylori     -resume Xarelto tonight, discharge tomorrow if no further bleeding     #DVT  -unclear history whether provoked or unprovoked, had IVC filter at some point that is now removed   -states she had the blood clot "removed" earlier this year but unable to provide additional information    -follow with "blood doctor", Dr. Stover ([phone] or [phone]) but these numbers are for a colorectal surgeon and PMR office?   -will attempt to obtain more collateral, unsuccessful to date  -will trial resumption of Xarelto this evening     DVT PPx: Xarelto     Dispo: home 73-year-old female with a PMHx of HTN, PAD, STARR and DVT (on Xarelto, s/p IVC filter that is now removed) who presented with melena and symptomatic anemia.       #Concern for UGIB  #Symptomatic Iron Deficiency Anemia     -GI consulted, EGD without evidence of bleed and colonoscopy grossly unremarkable (one area of possible angioectasia)   -continue with IV Iron x 3 days then transition to PO ferrous sulfate q48hrs upon discharge   -follow up H. pylori     -resume Xarelto tonight, discharge tomorrow if no further bleeding     #DVT  -unclear history whether provoked or unprovoked, had IVC filter at some point that is now removed   -states she had the blood clot "removed" earlier this year but unable to provide additional information    -follow with "blood doctor", Dr. Stover ([phone] or [phone]) but these numbers are for a colorectal surgeon and PMR office?   -will attempt to obtain more collateral, unsuccessful to date  -will trial resumption of Xarelto this evening     DVT PPx: Xarelto     Dispo: home

## 2023-12-07 NOTE — PROGRESS NOTE ADULT - PROBLEM SELECTOR PLAN 1
Pt presenting after 10-day history of melena and Hb of 6.2. S/p 2U pRBCs. Iron studies and MCV consistent with STARR. Started iron infusion on 12/4. Hb of 8.5 on 12/6, stable. EGD 12/5 negative for source of bleeding.  - Following GI recs: pending colonoscopy on 12/6  - C/w Iron 200mg IV qd (12/4-12/7 course)   - Monitor CBC (goal Hgb >7, transfuse if below)  - Maintain active T&S, 2 large bore IVs Pt presenting after 10-day history of melena and Hb of 6.2. S/p 2U pRBCs. Iron studies and MCV consistent with STARR. Started iron infusion on 12/4. Hb of 8.4 on 12/7, stable. EGD 12/5 negative for source of bleeding. Colonoscopy 12/6 showed stool throughout the colon and possible angioectasia in cecum.   - GI recs: F/u outpatient colonoscopy with 2-day prep and VCE to investigate source of bleeding. No GI contraindication to restarting xarelto, however should weigh risks given symptomatic STARR with potential GI bleed.  - C/w Iron 200mg IV qd (12/4-12/7 course)   - Monitor CBC (goal Hgb >7, transfuse if below)  - Maintain active T&S, 2 large bore IVs Pt presenting after 10-day history of melena and Hb of 6.2. S/p 2U pRBCs. Iron studies and MCV consistent with STARR. Started iron infusion on 12/4. Hb of 8.4 on 12/7, stable. EGD 12/5 negative for source of bleeding. Colonoscopy on 12/6 showed stool throughout the colon and possible angioectasia in cecum.   - GI recs: F/u outpatient colonoscopy with 2-day prep and VCE to investigate source of bleeding. No GI contraindication to restarting xarelto.   - C/w Iron 200mg IV qd (12/4-12/7 course)   - Monitor CBC (goal Hgb >7, transfuse if below)  - Maintain active T&S, 2 large bore IVs

## 2023-12-07 NOTE — PROGRESS NOTE ADULT - PROBLEM SELECTOR PLAN 6
Mg of 1.7 on 12/6.  - Repleted  - F/u AM Mg level Mg of 1.7 on 12/6, repleated x1. Mg increased to 1.9 on 12/7.  - Monitor Mg level

## 2023-12-07 NOTE — PROGRESS NOTE ADULT - PROBLEM SELECTOR PLAN 5
Pt reports hx of DVT many years ago, previously with IVC filter which has now been removed. Most recent DVT was in 02/2023. Reports compliance with Xarelto. Last dose of Xarelto on 12/3.   - Continue holding xarelto iso GIB (consider IVC filter vs. resuming xarelto, pending GI recs) Pt reports hx of DVT many years ago, previously with IVC filter which has now been removed. Most recent DVT was in 02/2023. Reports compliance with Xarelto. Last dose of Xarelto on 12/3.   - Trial xarelto 12/7 PM and monitor CBC on 12/8 AM prior to d/c Pt reports hx of DVT many years ago, previously with IVC filter which has now been removed. Most recent DVT was in 02/2023. Reports compliance with Xarelto. Last dose of Xarelto on 12/3.   - Trial xarelto 12/7 PM and monitor CBC on 12/8 AM prior to d/c  - Unable to get collateral from outpatient hematologist, pt does not have the correct name and contact info

## 2023-12-07 NOTE — PROGRESS NOTE ADULT - SUBJECTIVE AND OBJECTIVE BOX
O/N Events:   None    Subjective/ROS: Patient seen and examined at bedside. Resting comfortably in bed, states that she no longer feels the generalized weakness she initially presented with.  Denies fevers, HA, CP, SOB, or n/v.    Vital Signs Last 24 Hrs  T(C): 36.5 (07 Dec 2023 05:40), Max: 36.8 (06 Dec 2023 21:37)  T(F): 97.7 (07 Dec 2023 05:40), Max: 98.2 (06 Dec 2023 21:37)  HR: 69 (07 Dec 2023 05:40) (66 - 75)  BP: 145/68 (07 Dec 2023 05:40) (130/61 - 168/73)  BP(mean): 84 (06 Dec 2023 14:50) (84 - 84)  RR: 18 (07 Dec 2023 05:40) (18 - 18)  SpO2: 100% (07 Dec 2023 05:40) (100% - 100%)    Parameters below as of 06 Dec 2023 21:37  Patient On (Oxygen Delivery Method): room air    CAPILLARY BLOOD GLUCOSE    PHYSICAL EXAM  General: NAD  Head: NC/AT; MMM; PERRL; EOMI;  Neck: Supple; no JVD  Respiratory: CTAB; no wheezes/rales/rhonchi  Cardiovascular: Regular rhythm/rate; S1/S2+, no murmurs, rubs gallops   Gastrointestinal: Soft; NTND; bowel sounds normal and present  Extremities: WWP; no edema/cyanosis  Neurological: A&Ox3, CNII-XII grossly intact; no obvious focal deficits    Antimicrobials:  MEDICATIONS  (STANDING):    Standing Medications:  MEDICATIONS  (STANDING):  amLODIPine   Tablet 10 milliGRAM(s) Oral every 24 hours  budesonide  80 MICROgram(s)/formoterol 4.5 MICROgram(s) Inhaler 2 Puff(s) Inhalation two times a day  influenza  Vaccine (HIGH DOSE) 0.7 milliLiter(s) IntraMuscular once  iron sucrose Injectable 200 milliGRAM(s) IV Push once  rivaroxaban 10 milliGRAM(s) Oral with dinner  tiotropium 2.5 MICROgram(s) Inhaler 2 Puff(s) Inhalation daily    PRN Medications:  MEDICATIONS  (PRN):    penicillin (Unknown)  ibuprofen (Unknown)      LABS             8.4    6.43  )-----------( 213      ( 07 Dec 2023 08:30 )             26.9     12-07    138  |  106  |  7   ----------------------------<  101<H>  4.2   |  25  |  0.71    Ca    8.8      07 Dec 2023 08:30  Phos  3.1     12-07  Mg     1.9     12-07    TPro  7.4  /  Alb  3.7  /  TBili  0.2  /  DBili  x   /  AST  14  /  ALT  8<L>  /  AlkPhos  80  12-07    Urinalysis Basic - ( 07 Dec 2023 08:30 )    Color: x / Appearance: x / SG: x / pH: x  Gluc: 101 mg/dL / Ketone: x  / Bili: x / Urobili: x   Blood: x / Protein: x / Nitrite: x   Leuk Esterase: x / RBC: x / WBC x   Sq Epi: x / Non Sq Epi: x / Bacteria: x            IMAGING/EKG/ETC   O/N Events: None    Subjective/ROS: Patient seen and examined at bedside. Resting comfortably in bed, states that she no longer feels the generalized weakness she initially presented with. Denies fevers, HA, CP, SOB, or n/v.    Vital Signs Last 24 Hrs  T(C): 36.5 (07 Dec 2023 05:40), Max: 36.8 (06 Dec 2023 21:37)  T(F): 97.7 (07 Dec 2023 05:40), Max: 98.2 (06 Dec 2023 21:37)  HR: 69 (07 Dec 2023 05:40) (66 - 75)  BP: 145/68 (07 Dec 2023 05:40) (130/61 - 168/73)  BP(mean): 84 (06 Dec 2023 14:50) (84 - 84)  RR: 18 (07 Dec 2023 05:40) (18 - 18)  SpO2: 100% (07 Dec 2023 05:40) (100% - 100%)    Parameters below as of 06 Dec 2023 21:37  Patient On (Oxygen Delivery Method): room air    CAPILLARY BLOOD GLUCOSE    PHYSICAL EXAM  General: NAD  Head: NC/AT; MMM; PERRL; EOMI  Neck: Supple  Respiratory: CTAB; no wheezes/rales/rhonchi  Cardiovascular: Regular rhythm/rate; S1/S2+, no murmurs, rubs gallops   Gastrointestinal: Soft; NTND; bowel sounds normal and present  Extremities: WWP; no edema/cyanosis  Neurological: A&Ox3, CNII-XII grossly intact; no obvious focal deficits    Antimicrobials:  MEDICATIONS  (STANDING):    Standing Medications:  MEDICATIONS  (STANDING):  amLODIPine   Tablet 10 milliGRAM(s) Oral every 24 hours  budesonide  80 MICROgram(s)/formoterol 4.5 MICROgram(s) Inhaler 2 Puff(s) Inhalation two times a day  influenza  Vaccine (HIGH DOSE) 0.7 milliLiter(s) IntraMuscular once  iron sucrose Injectable 200 milliGRAM(s) IV Push once  rivaroxaban 10 milliGRAM(s) Oral with dinner  tiotropium 2.5 MICROgram(s) Inhaler 2 Puff(s) Inhalation daily    PRN Medications:  MEDICATIONS  (PRN):    penicillin (Unknown)  ibuprofen (Unknown)      LABS             8.4    6.43  )-----------( 213      ( 07 Dec 2023 08:30 )             26.9     12-07    138  |  106  |  7   ----------------------------<  101<H>  4.2   |  25  |  0.71    Ca    8.8      07 Dec 2023 08:30  Phos  3.1     12-07  Mg     1.9     12-07    TPro  7.4  /  Alb  3.7  /  TBili  0.2  /  DBili  x   /  AST  14  /  ALT  8<L>  /  AlkPhos  80  12-07    Urinalysis Basic - ( 07 Dec 2023 08:30 )    Color: x / Appearance: x / SG: x / pH: x  Gluc: 101 mg/dL / Ketone: x  / Bili: x / Urobili: x   Blood: x / Protein: x / Nitrite: x   Leuk Esterase: x / RBC: x / WBC x   Sq Epi: x / Non Sq Epi: x / Bacteria: x            IMAGING/EKG/ETC

## 2023-12-07 NOTE — PROGRESS NOTE ADULT - PROBLEM SELECTOR PLAN 4
Uses Metformin 500mg PO qD at home.   - C/w mISS while inpatient  - Continue holding home metformin while inpatient

## 2023-12-07 NOTE — CHART NOTE - NSCHARTNOTEFT_GEN_A_CORE
Pt seen at bedside, feels well today with no further episodes of melena and Hgb remains stable. Restarted on rivaroxaban. EGD/colonoscopy notable for possible stigmata of recent cecal AVM and post-ulcer deformities in stomach antrum, thus melena could possibly be due to bleed from AVM but not conclusive. As further workup would consider H. pylori testing, colonoscopy with 2-day prep, and/or VCE.    Recommendations:  - Please monitor for at least 1 day since anticoagulation was restarted to ensure Hgb stable and no further bleeding  - F/u H. pylori stool antigen  - Agree with IV iron  - Pt should have outpatient GI follow-up at [address] [place] [address], [address], [place] [address] (phone: [phone]). Please include this information in DC paperwork.    Bob (Cuate Perez MD  Gastroenterology Fellow  Pager (M-F 7a-5p): [phone]  Pager (after hours): Please call  for on-call fellow Pt seen at bedside, feels well today with no further episodes of melena and Hgb remains stable. Restarted on rivaroxaban. EGD/colonoscopy notable for possible stigmata of recent cecal AVM and post-ulcer deformities in stomach antrum, thus melena could possibly be due to bleed from AVM but not conclusive. As further workup would consider H. pylori testing, colonoscopy with 2-day prep, and/or VCE.    Recommendations:  - Please monitor for at least 1 day since anticoagulation was restarted to ensure Hgb stable and no further bleeding  - F/u H. pylori stool antigen  - Agree with IV iron  - Pt should have outpatient GI follow-up at [address] [place] [address], [address], [place] [address] (phone: [phone]). Please include this information in DC paperwork.    Bob (Cuate Perez MD  Gastroenterology Fellow  Pager (M-F 7a-5p): [phone]  Pager (after hours): Please call  for on-call fellow

## 2023-12-07 NOTE — PROGRESS NOTE ADULT - PROBLEM SELECTOR PLAN 2
On admission, pt presented with 10-day history of melena. EGD on 12/5 negative for source of bleeding.   - C/w Protonix 40mg IV BID   - F/u H. pylori stool antigen   - Following GI recs: pending colonoscopy on 12/6  - Continue holding xarelto iso GIB (consider IVC filter vs. resuming xarelto, pending GI recs) On admission, pt presented with 10-day history of melena. EGD on 12/5 negative for source of bleeding. Colonoscopy 12/6 showed stool throughout the colon and possible angioectasia in cecum.   - D/c Protonix 40mg IV BID   - F/u H. pylori stool antigen   - Following GI recs: f/u outpatient colonoscopy with 2-day prep and VCE  - Trial xarelto 12/7PM and monitor CBC On admission, pt presented with 10-day history of melena. EGD on 12/5 negative for source of bleeding. Colonoscopy 12/6 showed stool throughout the colon and possible angioectasia in cecum.   - D/c Protonix 40mg IV BID   - F/u H. pylori stool antigen   - Following GI recs: f/u outpatient colonoscopy with 2-day prep and VCE  - Trial xarelto 12/7 PM and monitor CBC on 12/8 AM prior to d/c On admission, pt presented with 10-day history of melena. EGD on 12/5 negative for source of bleeding. Colonoscopy 12/6 showed stool throughout the colon and possible angioectasia in cecum.   - D/c'd Protonix 40mg IV BID   - F/u H. pylori stool antigen   - Following GI recs: f/u outpatient colonoscopy with 2-day prep and VCE  - Trial xarelto 12/7 PM and monitor CBC on 12/8 AM prior to d/c

## 2023-12-07 NOTE — PROGRESS NOTE ADULT - PROBLEM SELECTOR PROBLEM 6
Central Prior Authorization Team   Phone: [phone]      PA NOT NEEDED    Medication: Lidocaine-Prilocaine 2.5-2.5% cream-PA NOT NEEDED  Insurance Company: [employer] - Phone [phone] Fax [phone]  Pharmacy Filling the Rx: [facility] [place], [place] - [address]  Filling Pharmacy Phone: [phone]  Filling Pharmacy Fax:    Start Date: 3/18/2022    Started PA on CMM and a response of No eligibility was found. Please reconfirm the patient's plan before submitting.  Called pharmacy, they state PA is not needed, they ran the medication through [facility]olo and the patient picked up.   Hypomagnesemia

## 2023-12-07 NOTE — PROGRESS NOTE ADULT - PROBLEM SELECTOR PLAN 3
Uses Lisinopril 40mg PO qD and Amlodipine 10mg PO qD at home. BP well-controlled on 12/6.   - C/w amlodipine 10mg PO qD  - Consider restarting Lisinopril if hypertensive Uses Lisinopril 40mg PO qD and Amlodipine 10mg PO qD at home.   - C/w amlodipine 10mg PO qD  - Consider restarting Lisinopril if hypertensive

## 2023-12-07 NOTE — CHART NOTE - NSCHARTNOTEFT_GEN_A_CORE
Pt pulled out her IV overnight. Pt is refusing to have her IV replaced. Discussed with pt the importance of having IV replaced due to restarting Xarelto tonight and her history of GIB. Pt understands risks and is still refusing to have her IV replaced.

## 2023-12-08 ENCOUNTER — TRANSCRIPTION ENCOUNTER (OUTPATIENT)
Age: 73
End: 2023-12-08

## 2023-12-08 LAB
ALBUMIN SERPL ELPH-MCNC: 3.7 G/DL — SIGNIFICANT CHANGE UP (ref 3.3–5)
ALBUMIN SERPL ELPH-MCNC: 3.7 G/DL — SIGNIFICANT CHANGE UP (ref 3.3–5)
ALP SERPL-CCNC: 85 U/L — SIGNIFICANT CHANGE UP (ref 40–120)
ALP SERPL-CCNC: 85 U/L — SIGNIFICANT CHANGE UP (ref 40–120)
ALT FLD-CCNC: 9 U/L — LOW (ref 10–45)
ALT FLD-CCNC: 9 U/L — LOW (ref 10–45)
ANION GAP SERPL CALC-SCNC: 12 MMOL/L — SIGNIFICANT CHANGE UP (ref 5–17)
ANION GAP SERPL CALC-SCNC: 12 MMOL/L — SIGNIFICANT CHANGE UP (ref 5–17)
AST SERPL-CCNC: 17 U/L — SIGNIFICANT CHANGE UP (ref 10–40)
AST SERPL-CCNC: 17 U/L — SIGNIFICANT CHANGE UP (ref 10–40)
BILIRUB SERPL-MCNC: 0.2 MG/DL — SIGNIFICANT CHANGE UP (ref 0.2–1.2)
BILIRUB SERPL-MCNC: 0.2 MG/DL — SIGNIFICANT CHANGE UP (ref 0.2–1.2)
BUN SERPL-MCNC: 10 MG/DL — SIGNIFICANT CHANGE UP (ref 7–23)
BUN SERPL-MCNC: 10 MG/DL — SIGNIFICANT CHANGE UP (ref 7–23)
CALCIUM SERPL-MCNC: 9.1 MG/DL — SIGNIFICANT CHANGE UP (ref 8.4–10.5)
CALCIUM SERPL-MCNC: 9.1 MG/DL — SIGNIFICANT CHANGE UP (ref 8.4–10.5)
CHLORIDE SERPL-SCNC: 108 MMOL/L — SIGNIFICANT CHANGE UP (ref 96–108)
CHLORIDE SERPL-SCNC: 108 MMOL/L — SIGNIFICANT CHANGE UP (ref 96–108)
CO2 SERPL-SCNC: 19 MMOL/L — LOW (ref 22–31)
CO2 SERPL-SCNC: 19 MMOL/L — LOW (ref 22–31)
CREAT SERPL-MCNC: 0.67 MG/DL — SIGNIFICANT CHANGE UP (ref 0.5–1.3)
CREAT SERPL-MCNC: 0.67 MG/DL — SIGNIFICANT CHANGE UP (ref 0.5–1.3)
EGFR: 93 ML/MIN/1.73M2 — SIGNIFICANT CHANGE UP
EGFR: 93 ML/MIN/1.73M2 — SIGNIFICANT CHANGE UP
GLUCOSE SERPL-MCNC: 72 MG/DL — SIGNIFICANT CHANGE UP (ref 70–99)
GLUCOSE SERPL-MCNC: 72 MG/DL — SIGNIFICANT CHANGE UP (ref 70–99)
HCT VFR BLD CALC: 28.7 % — LOW (ref 34.5–45)
HCT VFR BLD CALC: 28.7 % — LOW (ref 34.5–45)
HGB BLD-MCNC: 8.9 G/DL — LOW (ref 11.5–15.5)
HGB BLD-MCNC: 8.9 G/DL — LOW (ref 11.5–15.5)
MAGNESIUM SERPL-MCNC: 2 MG/DL — SIGNIFICANT CHANGE UP (ref 1.6–2.6)
MAGNESIUM SERPL-MCNC: 2 MG/DL — SIGNIFICANT CHANGE UP (ref 1.6–2.6)
MCHC RBC-ENTMCNC: 24.2 PG — LOW (ref 27–34)
MCHC RBC-ENTMCNC: 24.2 PG — LOW (ref 27–34)
MCHC RBC-ENTMCNC: 31 GM/DL — LOW (ref 32–36)
MCHC RBC-ENTMCNC: 31 GM/DL — LOW (ref 32–36)
MCV RBC AUTO: 78 FL — LOW (ref 80–100)
MCV RBC AUTO: 78 FL — LOW (ref 80–100)
NRBC # BLD: 0 /100 WBCS — SIGNIFICANT CHANGE UP (ref 0–0)
NRBC # BLD: 0 /100 WBCS — SIGNIFICANT CHANGE UP (ref 0–0)
PHOSPHATE SERPL-MCNC: 3.2 MG/DL — SIGNIFICANT CHANGE UP (ref 2.5–4.5)
PHOSPHATE SERPL-MCNC: 3.2 MG/DL — SIGNIFICANT CHANGE UP (ref 2.5–4.5)
PLATELET # BLD AUTO: 236 K/UL — SIGNIFICANT CHANGE UP (ref 150–400)
PLATELET # BLD AUTO: 236 K/UL — SIGNIFICANT CHANGE UP (ref 150–400)
POTASSIUM SERPL-MCNC: 4.4 MMOL/L — SIGNIFICANT CHANGE UP (ref 3.5–5.3)
POTASSIUM SERPL-MCNC: 4.4 MMOL/L — SIGNIFICANT CHANGE UP (ref 3.5–5.3)
POTASSIUM SERPL-SCNC: 4.4 MMOL/L — SIGNIFICANT CHANGE UP (ref 3.5–5.3)
POTASSIUM SERPL-SCNC: 4.4 MMOL/L — SIGNIFICANT CHANGE UP (ref 3.5–5.3)
PROT SERPL-MCNC: 7.6 G/DL — SIGNIFICANT CHANGE UP (ref 6–8.3)
PROT SERPL-MCNC: 7.6 G/DL — SIGNIFICANT CHANGE UP (ref 6–8.3)
RBC # BLD: 3.68 M/UL — LOW (ref 3.8–5.2)
RBC # BLD: 3.68 M/UL — LOW (ref 3.8–5.2)
RBC # FLD: 21.2 % — HIGH (ref 10.3–14.5)
RBC # FLD: 21.2 % — HIGH (ref 10.3–14.5)
SODIUM SERPL-SCNC: 139 MMOL/L — SIGNIFICANT CHANGE UP (ref 135–145)
SODIUM SERPL-SCNC: 139 MMOL/L — SIGNIFICANT CHANGE UP (ref 135–145)
WBC # BLD: 6.72 K/UL — SIGNIFICANT CHANGE UP (ref 3.8–10.5)
WBC # BLD: 6.72 K/UL — SIGNIFICANT CHANGE UP (ref 3.8–10.5)
WBC # FLD AUTO: 6.72 K/UL — SIGNIFICANT CHANGE UP (ref 3.8–10.5)
WBC # FLD AUTO: 6.72 K/UL — SIGNIFICANT CHANGE UP (ref 3.8–10.5)

## 2023-12-08 PROCEDURE — 85610 PROTHROMBIN TIME: CPT

## 2023-12-08 PROCEDURE — 94640 AIRWAY INHALATION TREATMENT: CPT

## 2023-12-08 PROCEDURE — 86850 RBC ANTIBODY SCREEN: CPT

## 2023-12-08 PROCEDURE — 86900 BLOOD TYPING SEROLOGIC ABO: CPT

## 2023-12-08 PROCEDURE — 86923 COMPATIBILITY TEST ELECTRIC: CPT

## 2023-12-08 PROCEDURE — 83550 IRON BINDING TEST: CPT

## 2023-12-08 PROCEDURE — 85027 COMPLETE CBC AUTOMATED: CPT

## 2023-12-08 PROCEDURE — 82728 ASSAY OF FERRITIN: CPT

## 2023-12-08 PROCEDURE — 86901 BLOOD TYPING SEROLOGIC RH(D): CPT

## 2023-12-08 PROCEDURE — 83735 ASSAY OF MAGNESIUM: CPT

## 2023-12-08 PROCEDURE — 93005 ELECTROCARDIOGRAM TRACING: CPT

## 2023-12-08 PROCEDURE — 85045 AUTOMATED RETICULOCYTE COUNT: CPT

## 2023-12-08 PROCEDURE — 71045 X-RAY EXAM CHEST 1 VIEW: CPT

## 2023-12-08 PROCEDURE — 83010 ASSAY OF HAPTOGLOBIN QUANT: CPT

## 2023-12-08 PROCEDURE — 85025 COMPLETE CBC W/AUTO DIFF WBC: CPT

## 2023-12-08 PROCEDURE — 82962 GLUCOSE BLOOD TEST: CPT

## 2023-12-08 PROCEDURE — 99285 EMERGENCY DEPT VISIT HI MDM: CPT | Mod: 25

## 2023-12-08 PROCEDURE — 84100 ASSAY OF PHOSPHORUS: CPT

## 2023-12-08 PROCEDURE — 85730 THROMBOPLASTIN TIME PARTIAL: CPT

## 2023-12-08 PROCEDURE — 99239 HOSP IP/OBS DSCHRG MGMT >30: CPT | Mod: GC

## 2023-12-08 PROCEDURE — 36430 TRANSFUSION BLD/BLD COMPNT: CPT

## 2023-12-08 PROCEDURE — 83540 ASSAY OF IRON: CPT

## 2023-12-08 PROCEDURE — 80053 COMPREHEN METABOLIC PANEL: CPT

## 2023-12-08 PROCEDURE — 36415 COLL VENOUS BLD VENIPUNCTURE: CPT

## 2023-12-08 PROCEDURE — 96374 THER/PROPH/DIAG INJ IV PUSH: CPT

## 2023-12-08 PROCEDURE — P9016: CPT

## 2023-12-08 PROCEDURE — 97161 PT EVAL LOW COMPLEX 20 MIN: CPT

## 2023-12-08 RX ORDER — FERROUS SULFATE 325(65) MG
1 TABLET ORAL
Qty: 15 | Refills: 0
Start: 2023-12-08 | End: 2024-01-06

## 2023-12-08 RX ADMIN — TIOTROPIUM BROMIDE 2 PUFF(S): 18 CAPSULE ORAL; RESPIRATORY (INHALATION) at 09:11

## 2023-12-08 RX ADMIN — BUDESONIDE AND FORMOTEROL FUMARATE DIHYDRATE 2 PUFF(S): 160; 4.5 AEROSOL RESPIRATORY (INHALATION) at 09:11

## 2023-12-08 NOTE — DISCHARGE NOTE NURSING/CASE MANAGEMENT/SOCIAL WORK - NSDCPEFALRISK_GEN_ALL_CORE
For information on Fall & Injury Prevention, visit: [email or web address] OR  [email or web address] OR  https://www.cdc.gov/steadi/patient.html For information on Fall & Injury Prevention, visit: [email or web address] OR  [email or web address] OR  https://www.cdc.gov/steadi/patient.html

## 2023-12-08 NOTE — PHYSICAL THERAPY INITIAL EVALUATION ADULT - DIAGNOSIS, PT EVAL
David Olsen was admitted to [place] [address] from home via cart accompanied by RN.   Reason for hospitalization is right tonsil absess.   Upon arrival, patient is stable. Patient has history significant for   Past Medical History:   Diagnosis Date   • Colon Polyp 08/27/2021    Dr. Mayo, tubular adenoma recall 5years     .  Patient oriented to bed, call light, , room and unit.  Patient provided with the following educational materials upon admission:safety, advanced directives, infection control and pain.   Level of understanding patient verbalized understanding.   Admission orders received at this time.   Dr Lynn notified of patient arrival.   See Epic documentation for patient individualized nursing care plan.   5A: Primary Prevention/Risk Reduction for Loss of Balance and Falling

## 2023-12-08 NOTE — DISCHARGE NOTE NURSING/CASE MANAGEMENT/SOCIAL WORK - PATIENT PORTAL LINK FT
You can access the FollowMyHealth Patient Portal offered by [facility] by registering at the following website: [email or web address]. By joining [employer]’s FollowMyHealth portal, you will also be able to view your health information using other applications (apps) compatible with our system. You can access the FollowMyHealth Patient Portal offered by [facility] by registering at the following website: [email or web address]. By joining [employer]’s FollowMyHealth portal, you will also be able to view your health information using other applications (apps) compatible with our system.

## 2023-12-08 NOTE — PHYSICAL THERAPY INITIAL EVALUATION ADULT - PERTINENT HX OF CURRENT PROBLEM, REHAB EVAL
73F with PMH of HTN, 2 episodes of DVT (most recent in 02/2023) on Xarelto, HTN, PAD, STARR who presents for 2-day history of fatigue and shortness of breath, admitted for symptomatic anemia, c/f GI bleed. EGD 12/5 negative. Colonoscopy 12/6 showed stool throughout the colon and possible angioectasia in cecum. Restarted home Xarelto 20mg PO qhs on 12/7, will monitor for 1 day for signs/symptoms of active bleed.

## 2023-12-08 NOTE — PROGRESS NOTE ADULT - SUBJECTIVE AND OBJECTIVE BOX
Physical Medicine and Rehabilitation Progress Note :       Patient is a 73y old  Female who presents with a chief complaint of Melena (08 Dec 2023 06:36)      HPI:  73F with pmhx of HTN, DVT on Xarelto, HTN, PAD, STARR who presents for 2 day history of fatigue and shortness of breath. Pt reports over the last 10 days pt has been having dark stools. Initially thought the dark bowel movements were secondary to a medication change (her lisinopril dosage remained the same, but the pill itself changed in shape and color). Due to persistent fatigue, she was nervous she had low blood counts as she has had in the past, which is what prompted her to come to the ED. Pt reports she last had a blood transfusion 2-3 years ago and has not required one recently (follows with a PCP who checks her labs).     Of note, pt reports in 2016 she had an episode of hematemesis 2/2 NSAID use. She was hospitalized at [facility] and required transfusions at that time and "emergent surgery" but is unsure what the surgery was. Since then, pt was instructed not to take NSAIDs.     In the ED:  Vitals: T 97.7, HR 81, /89, 99% on RA  Labs: WBC 5.64, Hgb 6.2, plt 178, INR 1.33, Cr 0.73  total iron 17, % sat 4, unsaturated iron binding capacity 380  EKG: NSR at 68bpm, no acute ST wave changes  CXR: increased vascular markings b/l, no acute infiltrate  Interventions: protonix 80mg IV x1 (04 Dec 2023 15:03)                            8.9    6.72  )-----------( 236      ( 08 Dec 2023 05:30 )             28.7       12-08    139  |  108  |  10  ----------------------------<  72  4.4   |  19<L>  |  0.67    Ca    9.1      08 Dec 2023 05:30  Phos  3.2     12-08  Mg     2.0     12-08    TPro  7.6  /  Alb  3.7  /  TBili  0.2  /  DBili  x   /  AST  17  /  ALT  9<L>  /  AlkPhos  85  12-08    Vital Signs Last 24 Hrs  T(C): 36.9 (07 Dec 2023 22:05), Max: 37 (07 Dec 2023 15:47)  T(F): 98.4 (07 Dec 2023 22:05), Max: 98.6 (07 Dec 2023 15:47)  HR: 72 (07 Dec 2023 22:05) (71 - 74)  BP: 169/80 (07 Dec 2023 22:05) (138/61 - 169/80)  BP(mean): --  RR: 17 (07 Dec 2023 22:05) (17 - 17)  SpO2: 100% (07 Dec 2023 22:05) (100% - 100%)    Parameters below as of 07 Dec 2023 22:05  Patient On (Oxygen Delivery Method): room air        MEDICATIONS  (STANDING):  amLODIPine   Tablet 10 milliGRAM(s) Oral every 24 hours  budesonide  80 MICROgram(s)/formoterol 4.5 MICROgram(s) Inhaler 2 Puff(s) Inhalation two times a day  influenza  Vaccine (HIGH DOSE) 0.7 milliLiter(s) IntraMuscular once  rivaroxaban 20 milliGRAM(s) Oral with dinner  tiotropium 2.5 MICROgram(s) Inhaler 2 Puff(s) Inhalation daily    MEDICATIONS  (PRN):       Functional Status Assessment :       Previous Level of Function:     · Ambulation Skills	independent  · Transfer Skills	independent  · ADL Skills	independent  · Work/Leisure Activity	independent  · Additional Comments	Prior to admission given by pt and chart; pt reports living alone in an elevator apt, no steps to enter, indep with ADLs and mobility with cane. pt has HHA 5.5h for 7 days.    Cognitive Status Examination:   · Orientation	oriented to person, place, time and situation  · Level of Consciousness	combative; alert  · Follows Commands and Answers Questions	100% of the time  · Personal Safety and Judgment	intact  · Short Term Memory	intact    Range of Motion Exam:   · Range of Motion Examination	bilateral lower extremity ROM was WFL (within functional limits); bilateral upper extremity ROM was WFL (within functional limits)    Manual Muscle Testing:   · Manual Muscle Testing Results	no strength deficits were identified    Bed Mobility: Sit to Supine:     · Level of [place]	independent    Bed Mobility: Supine to Sit:     · Level of [place]	independent    Transfer: Sit to Stand:     · Level of [place]	independent    Transfer: Stand to Sit:     · Level of [place]	independent    Gait Skills:     · Level of [place]	unable to perform; pt declined further assessment    Balance Skills Assessment:     · Sitting Balance: Static	normal balance  · Sitting Balance: Dynamic	normal balance  · Standing Balance: Static	normal balance  · Standing Balance: Dynamic	normal balance    Sensory Examination:   Sensory Examination:    Grossly Intact:   · Gross Sensory Examination	Grossly Intact          PM&R Impression : as above    Current disposition plan recommendation :    d/c home with no PT/OT needs            Physical Medicine and Rehabilitation Progress Note :       Patient is a 73y old  Female who presents with a chief complaint of Melena (08 Dec 2023 06:36)      HPI:  73F with pmhx of HTN, DVT on Xarelto, HTN, PAD, STARR who presents for 2 day history of fatigue and shortness of breath. Pt reports over the last 10 days pt has been having dark stools. Initially thought the dark bowel movements were secondary to a medication change (her lisinopril dosage remained the same, but the pill itself changed in shape and color). Due to persistent fatigue, she was nervous she had low blood counts as she has had in the past, which is what prompted her to come to the ED. Pt reports she last had a blood transfusion 2-3 years ago and has not required one recently (follows with a PCP who checks her labs).     Of note, pt reports in 2016 she had an episode of hematemesis 2/2 NSAID use. She was hospitalized at [facility] and required transfusions at that time and "emergent surgery" but is unsure what the surgery was. Since then, pt was instructed not to take NSAIDs.     In the ED:  Vitals: T 97.7, HR 81, /89, 99% on RA  Labs: WBC 5.64, Hgb 6.2, plt 178, INR 1.33, Cr 0.73  total iron 17, % sat 4, unsaturated iron binding capacity 380  EKG: NSR at 68bpm, no acute ST wave changes  CXR: increased vascular markings b/l, no acute infiltrate  Interventions: protonix 80mg IV x1 (04 Dec 2023 15:03)                            8.9    6.72  )-----------( 236      ( 08 Dec 2023 05:30 )             28.7       12-08    139  |  108  |  10  ----------------------------<  72  4.4   |  19<L>  |  0.67    Ca    9.1      08 Dec 2023 05:30  Phos  3.2     12-08  Mg     2.0     12-08    TPro  7.6  /  Alb  3.7  /  TBili  0.2  /  DBili  x   /  AST  17  /  ALT  9<L>  /  AlkPhos  85  12-08    Vital Signs Last 24 Hrs  T(C): 36.9 (07 Dec 2023 22:05), Max: 37 (07 Dec 2023 15:47)  T(F): 98.4 (07 Dec 2023 22:05), Max: 98.6 (07 Dec 2023 15:47)  HR: 72 (07 Dec 2023 22:05) (71 - 74)  BP: 169/80 (07 Dec 2023 22:05) (138/61 - 169/80)  BP(mean): --  RR: 17 (07 Dec 2023 22:05) (17 - 17)  SpO2: 100% (07 Dec 2023 22:05) (100% - 100%)    Parameters below as of 07 Dec 2023 22:05  Patient On (Oxygen Delivery Method): room air        MEDICATIONS  (STANDING):  amLODIPine   Tablet 10 milliGRAM(s) Oral every 24 hours  budesonide  80 MICROgram(s)/formoterol 4.5 MICROgram(s) Inhaler 2 Puff(s) Inhalation two times a day  influenza  Vaccine (HIGH DOSE) 0.7 milliLiter(s) IntraMuscular once  rivaroxaban 20 milliGRAM(s) Oral with dinner  tiotropium 2.5 MICROgram(s) Inhaler 2 Puff(s) Inhalation daily    MEDICATIONS  (PRN):       Functional Status Assessment :       Previous Level of Function:     · Ambulation Skills	independent  · Transfer Skills	independent  · ADL Skills	independent  · Work/Leisure Activity	independent  · Additional Comments	Prior to admission given by pt and chart; pt reports living alone in an elevator apt, no steps to enter, indep with ADLs and mobility with cane. pt has HHA 5.5h for 7 days.    Cognitive Status Examination:   · Orientation	oriented to person, place, time and situation  · Level of Consciousness	combative; alert  · Follows Commands and Answers Questions	100% of the time  · Personal Safety and Judgment	intact  · Short Term Memory	intact    Range of Motion Exam:   · Range of Motion Examination	bilateral lower extremity ROM was WFL (within functional limits); bilateral upper extremity ROM was WFL (within functional limits)    Manual Muscle Testing:   · Manual Muscle Testing Results	no strength deficits were identified    Bed Mobility: Sit to Supine:     · Level of [place]	independent    Bed Mobility: Supine to Sit:     · Level of [place]	independent    Transfer: Sit to Stand:     · Level of [place]	independent    Transfer: Stand to Sit:     · Level of [place]	independent    Gait Skills:     · Level of [place]	unable to perform; pt declined further assessment    Balance Skills Assessment:     · Sitting Balance: Static	normal balance  · Sitting Balance: Dynamic	normal balance  · Standing Balance: Static	normal balance  · Standing Balance: Dynamic	normal balance    Sensory Examination:   Sensory Examination:    Grossly Intact:   · Gross Sensory Examination	Grossly Intact          PM&R Impression : as above    Current disposition plan recommendation :    d/c home with no PT/OT needs

## 2023-12-08 NOTE — PROGRESS NOTE ADULT - ASSESSMENT
{\rtf1\lxtvoo40093\ansi\xjkdxzx7775\ftnbj\uc1\deff0  {\fonttbl{\f0 \fnil Segoe UI;}{\f1 \fnil \fcharset0 Segoe UI;}{\f2 \fnil Times New [place];}}  {\colortbl ;\zbr352\rvsfh590\zdof574 ;\red0\green0\blue0 ;\red0\green0\wkos251 ;\red0\green0\blue0 ;}  {\stylesheet{\f0\fs20 Normal;}{\cs1 Default Paragraph Font;}{\cs2\f0\fs16 Line Number;}{\cs3\f2\fs24\ul\cf3 Hyperlink;}}  {\*\revtbl{Unknown;}}  \prroas83065\usrnsx34981\iwyxz0001\oucls3520\iunwx8383\plsct3244\yptxyce244\xvwpcjj941\nogrowautofit\pvkqyz990\formshade\nofeaturethrottle1\dntblnsbdb\fet4\aendnotes\aftnnrlc\pgbrdrhead\pgbrdrfoot  \sectd\jslyse21500\lzibpe38063\guttersxn0\fskgkxaj5715\murtrkqo4858\pwlujebk0299\trjkffsh1064\qiefwrr254\srsdgnm262\sbkpage\pgncont\pgndec  \plain\plain\f0\fs24\ql\plain\f0\fs24\plain\f0\fs20\ecms5904\hich\f0\dbch\f0\loch\f0\fs20\par  I M\par  \par  73 y o F with PMH of HTN, 2 episodes of DVT (most recent in 02/2023) on Xarelto, HTN, PAD, STARR who presents for 2-day history of fatigue and shortness of breath, admitted for symptomatic anemia, c/f GI bleed. EGD 12/5 negative. Colonoscopy 12/6 showed   stool throughout the colon and possible angioectasia in cecum. Restarted home Xarelto 20mg PO qhs on 12/7, will monitor for 1 day for signs/symptoms of active bleed. \par  \plain\f1\fs20\jddn2616\hich\f1\dbch\f1\loch\f1\cf2\fs20\strike\plain\f1\fs20\wdxd7986\hich\f1\dbch\f1\loch\f1\cf2\fs20\plain\f0\fs20\ufor8603\hich\f0\dbch\f0\loch\f0\fs20\par  \plain\f1\fs20\ubmb7254\hich\f1\dbch\f1\loch\f1\cf2\fs20\ul{\field{\*\fldinst HYPERLINK [id number] }{\fldrslt Problem/Plan - 1:}}\plain\f0\fs20\vgpw4923\hich\f0\dbch\f0\loch\f0\fs20\ql\par  \'b7  {\*\bkmkstart qy29532349171}{\*\bkmkend yq30633916079}Problem: {\*\bkmkstart fq80725693679}{\*\bkmkend ca57426529637}Anemia due to acute blood loss. \par  \'b7  {\*\bkmkstart qs29722417604}{\*\bkmkend rk10139217238}Plan: {\*\bkmkstart na34854809710}{\*\bkmkend av68528097935}Pt presenting after 10-day history of melena and Hb of 6.2. S/p 2U pRBCs. Iron studies and MCV consistent with STARR. Started iron infusion   on 12/4. Hb of 8.4 on 12/7, stable. EGD 12/5 negative for source of bleeding. Colonoscopy on 12/6 showed stool throughout the colon and possible angioectasia in cecum. \par  - GI recs: F/u outpatient colonoscopy with 2-day prep and VCE to investigate source of bleeding. No GI contraindication to restarting xarelto. \par  - C/w Iron 200mg IV qd (12/4-12/7 course) \par  - Monitor CBC (goal Hgb >7, transfuse if below)\par  - Maintain active T&S, 2 large bore IVs.\plain\f1\fs20\cslt2300\hich\f1\dbch\f1\loch\f1\cf2\fs20\strike\plain\f0\fs20\dhks5924\hich\f0\dbch\f0\loch\f0\fs20\par  \par  \plain\f1\fs20\akuw2239\hich\f1\dbch\f1\loch\f1\cf2\fs20\ul{\field{\*\fldinst HYPERLINK [id number] }{\fldrslt Problem/Plan - 2:}}\plain\f0\fs20\kacv6228\hich\f0\dbch\f0\loch\f0\fs20\ql\par  \'b7  {\*\bkmkstart qx62498940341}{\*\bkmkend nt89643438013}Problem: {\*\bkmkstart bf22790162850}{\*\bkmkend ip5219513}Upper GI bleeding. \par  \'b7  {\*\bkmkstart fu85005689980}{\*\bkmkend zd51451870959}Plan: {\*\bkmkstart ja46022544872}{\*\bkmkend qp41278161208}On admission, pt presented with 10-day history of melena. EGD on 12/5 negative for source of bleeding. Colonoscopy 12/6 showed stool   throughout the colon and possible angioectasia in cecum. \par  - D/c'd Protonix 40mg IV BID \par  - F/u H. pylori stool antigen \par  - Following GI recs: f/u outpatient colonoscopy with 2-day prep and VCE\par  - Trial xarelto 12/7 PM and monitor CBC on 12/8 AM prior to d/c.\plain\f1\fs20\cyip6074\hich\f1\dbch\f1\loch\f1\cf2\fs20\strike\plain\f0\fs20\jdlh7714\hich\f0\dbch\f0\loch\f0\fs20\par  \par  \plain\f1\fs20\prgv8333\hich\f1\dbch\f1\loch\f1\cf2\fs20\ul{\field{\*\fldinst HYPERLINK [id number] }{\fldrslt Problem/Plan - 3:}}\plain\f0\fs20\snrv8073\hich\f0\dbch\f0\loch\f0\fs20\ql\par  \'b7  {\*\bkmkstart uw48587159977}{\*\bkmkend az33099924642}Problem: {\*\bkmkstart zv68945493914}{\*\bkmkend kt02397145193}Hypertension. \par  \'b7  {\*\bkmkstart qt49704668564}{\*\bkmkend az40635069299}Plan: {\*\bkmkstart wa96166790901}{\*\bkmkend qk35718959867}Uses Lisinopril 40mg PO qD and Amlodipine 10mg PO qD at home. \par  - C/w amlodipine 10mg PO qD\par  - Consider restarting Lisinopril if hypertensive.\plain\f1\fs20\spho3289\hich\f1\dbch\f1\loch\f1\cf2\fs20\strike\plain\f0\fs20\qfws7466\hich\f0\dbch\f0\loch\f0\fs20\par  \par  \plain\f1\fs20\krdv4559\hich\f1\dbch\f1\loch\f1\cf2\fs20\ul{\field{\*\fldinst HYPERLINK [id number] }{\fldrslt Problem/Plan - 4:}}\plain\f0\fs20\lnms4255\hich\f0\dbch\f0\loch\f0\fs20\ql\par  \'b7  {\*\bkmkstart qd16129508435}{\*\bkmkend jv41289494048}Problem: {\*\bkmkstart xs20145395768}{\*\bkmkend bb85859295001}Diabetes. \par  \'b7  {\*\bkmkstart ww39882068711}{\*\bkmkend hx28662087949}Plan: {\*\bkmkstart nm01810331815}{\*\bkmkend qa06927185646}Uses Metformin 500mg PO qD at home. \par  - C/w mISS while inpatient\par  - Continue holding home metformin while inpatient.\par  \par  \plain\f1\fs20\lkwj9778\hich\f1\dbch\f1\loch\f1\cf2\fs20\ul{\field{\*\fldinst HYPERLINK [id number] }{\fldrslt Problem/Plan - 5:}}\plain\f0\fs20\jwtl1215\hich\f0\dbch\f0\loch\f0\fs20\ql\par  \'b7  {\*\bkmkstart ea53927214248}{\*\bkmkend tq37710097640}Problem: {\*\bkmkstart ww70949551675}{\*\bkmkend xb67821971825}History of DVT in adulthood. \par  \'b7  {\*\bkmkstart lw03901370742}{\*\bkmkend hx28504231754}Plan: {\*\bkmkstart ca16432020636}{\*\bkmkend bd78835710833}Pt reports hx of DVT many years ago, previously with IVC filter which has now been removed. Most recent DVT was in 02/2023. Reports   compliance with Xarelto. Last dose of Xarelto on 12/3. \par  - Trial xarelto 12/7 PM and monitor CBC on 12/8 AM prior to d/c\par  - Unable to get collateral from outpatient hematologist, pt does not have the correct name and contact info.\plain\f1\fs20\pjav4026\hich\f1\dbch\f1\loch\f1\cf2\fs20\strike\plain\f0\fs20\eupd7844\hich\f0\dbch\f0\loch\f0\fs20\par  \par  \plain\f1\fs20\hxaw0521\hich\f1\dbch\f1\loch\f1\cf2\fs20\ul{\field{\*\fldinst HYPERLINK [id number] }{\fldrslt Problem/Plan - 6:}}\plain\f0\fs20\wnae7156\hich\f0\dbch\f0\loch\f0\fs20\ql\par  \'b7  {\*\bkmkstart aa25178217307}{\*\bkmkend jh54193558748}Problem: {\*\bkmkstart vn05908567823}{\*\bkmkend fr46128589940}Hypomagnesemia. \par  \'b7  {\*\bkmkstart yg53921875785}{\*\bkmkend nn87929632359}Plan: {\*\bkmkstart kt91166198415}{\*\bkmkend nm85743627093}Mg of 1.7 on 12/6, repleated x1. Mg increased to 1.9 on 12/7.\par  - Monitor Mg level.\par  \par  \plain\f1\fs20\ypgc5729\hich\f1\dbch\f1\loch\f1\cf2\fs20\ul{\field{\*\fldinst HYPERLINK [id number] }{\fldrslt Problem/Plan - 7:}}\plain\f0\fs20\uojz7052\hich\f0\dbch\f0\loch\f0\fs20\ql\par  \'b7  {\*\bkmkstart wt00501056643}{\*\bkmkend ld45110983977}Problem: {\*\bkmkstart lb80655689115}{\*\bkmkend pf74432969534}Prophylactic measure. \par  \'b7  {\*\bkmkstart kp08876157438}{\*\bkmkend pq09374984947}Plan: {\*\bkmkstart pb80738004830}{\*\bkmkend bd10824869222}F: None\par  E: replete K>4 Mg>2\par  N: NPO\par  D: SCDs (concern for bleed)\par  Dispo: RMF.\par  \par  \par  }   {\rtf1\ddyjzz24706\ansi\srxmomd2987\ftnbj\uc1\deff0  {\fonttbl{\f0 \fnil Segoe UI;}{\f1 \fnil \fcharset0 Segoe UI;}{\f2 \fnil Times [place];}}  {\colortbl ;\non124\lhlor659\eqie753 ;\red0\green0\blue0 ;\red0\green0\oaxd508 ;\red0\green0\blue0 ;}  {\stylesheet{\f0\fs20 Normal;}{\cs1 Default Paragraph Font;}{\cs2\f0\fs16 Line Number;}{\cs3\f2\fs24\ul\cf3 Hyperlink;}}  {\*\revtbl{Unknown;}}  \rggvyh66813\nffyve85470\ympqu1949\uxtdg5255\uumjf6837\atfax3559\xrayptm499\ufbnunk886\nogrowautofit\ozneds208\formshade\nofeaturethrottle1\dntblnsbdb\fet4\aendnotes\aftnnrlc\pgbrdrhead\pgbrdrfoot  \sectd\kwtdqi47109\lltmxi81158\guttersxn0\diiugdbb5304\xdwsgjwx2416\ecturfqd3932\folkzalm7207\rterbfn449\gzfxpal224\sbkpage\pgncont\pgndec  \plain\plain\f0\fs24\ql\plain\f0\fs24\plain\f0\fs20\sonr0023\hich\f0\dbch\f0\loch\f0\fs20\par  I M\par  \par  73 y o F with PMH of HTN, 2 episodes of DVT (most recent in 02/2023) on Xarelto, HTN, PAD, STARR who presents for 2-day history of fatigue and shortness of breath, admitted for symptomatic anemia, c/f GI bleed. EGD 12/5 negative. Colonoscopy 12/6 showed   stool throughout the colon and possible angioectasia in cecum. Restarted home Xarelto 20mg PO qhs on 12/7, will monitor for 1 day for signs/symptoms of active bleed. \par  \plain\f1\fs20\nvnm9612\hich\f1\dbch\f1\loch\f1\cf2\fs20\strike\plain\f1\fs20\ytpf4296\hich\f1\dbch\f1\loch\f1\cf2\fs20\plain\f0\fs20\wxff5034\hich\f0\dbch\f0\loch\f0\fs20\par  \plain\f1\fs20\vffw9772\hich\f1\dbch\f1\loch\f1\cf2\fs20\ul{\field{\*\fldinst HYPERLINK [id number] }{\fldrslt Problem/Plan - 1:}}\plain\f0\fs20\zapv1334\hich\f0\dbch\f0\loch\f0\fs20\ql\par  \'b7  {\*\bkmkstart gu07295362658}{\*\bkmkend fo10195472124}Problem: {\*\bkmkstart tz87588067317}{\*\bkmkend hl65902135670}Anemia due to acute blood loss. \par  \'b7  {\*\bkmkstart eq36730015107}{\*\bkmkend fy46969691470}Plan: {\*\bkmkstart vl14304165981}{\*\bkmkend kw90936972559}Pt presenting after 10-day history of melena and Hb of 6.2. S/p 2U pRBCs. Iron studies and MCV consistent with STARR. Started iron infusion   on 12/4. Hb of 8.4 on 12/7, stable. EGD 12/5 negative for source of bleeding. Colonoscopy on 12/6 showed stool throughout the colon and possible angioectasia in cecum. \par  - GI recs: F/u outpatient colonoscopy with 2-day prep and VCE to investigate source of bleeding. No GI contraindication to restarting xarelto. \par  - C/w Iron 200mg IV qd (12/4-12/7 course) \par  - Monitor CBC (goal Hgb >7, transfuse if below)\par  - Maintain active T&S, 2 large bore IVs.\plain\f1\fs20\qfyg9066\hich\f1\dbch\f1\loch\f1\cf2\fs20\strike\plain\f0\fs20\hyyj8364\hich\f0\dbch\f0\loch\f0\fs20\par  \par  \plain\f1\fs20\xlnq9984\hich\f1\dbch\f1\loch\f1\cf2\fs20\ul{\field{\*\fldinst HYPERLINK [id number] }{\fldrslt Problem/Plan - 2:}}\plain\f0\fs20\tbcd3540\hich\f0\dbch\f0\loch\f0\fs20\ql\par  \'b7  {\*\bkmkstart ye41314522832}{\*\bkmkend vq86971531472}Problem: {\*\bkmkstart hc92977662942}{\*\bkmkend xi16564074225}Upper GI bleeding. \par  \'b7  {\*\bkmkstart ka10073716832}{\*\bkmkend fz13536786053}Plan: {\*\bkmkstart bi88023606629}{\*\bkmkend cx70014454855}On admission, pt presented with 10-day history of melena. EGD on 12/5 negative for source of bleeding. Colonoscopy 12/6 showed stool   throughout the colon and possible angioectasia in cecum. \par  - D/c'd Protonix 40mg IV BID \par  - F/u H. pylori stool antigen \par  - Following GI recs: f/u outpatient colonoscopy with 2-day prep and VCE\par  - Trial xarelto 12/7 PM and monitor CBC on 12/8 AM prior to d/c.\plain\f1\fs20\jqok7892\hich\f1\dbch\f1\loch\f1\cf2\fs20\strike\plain\f0\fs20\yrba1777\hich\f0\dbch\f0\loch\f0\fs20\par  \par  \plain\f1\fs20\gfat6570\hich\f1\dbch\f1\loch\f1\cf2\fs20\ul{\field{\*\fldinst HYPERLINK [id number] }{\fldrslt Problem/Plan - 3:}}\plain\f0\fs20\wkbw1851\hich\f0\dbch\f0\loch\f0\fs20\ql\par  \'b7  {\*\bkmkstart jn55649143515}{\*\bkmkend hy11080014013}Problem: {\*\bkmkstart bm54226760224}{\*\bkmkend gy48580811541}Hypertension. \par  \'b7  {\*\bkmkstart nm58902532749}{\*\bkmkend wn95076693370}Plan: {\*\bkmkstart jp57691990038}{\*\bkmkend or07237012281}Uses Lisinopril 40mg PO qD and Amlodipine 10mg PO qD at home. \par  - C/w amlodipine 10mg PO qD\par  - Consider restarting Lisinopril if hypertensive.\plain\f1\fs20\gbwf6805\hich\f1\dbch\f1\loch\f1\cf2\fs20\strike\plain\f0\fs20\dvif5245\hich\f0\dbch\f0\loch\f0\fs20\par  \par  \plain\f1\fs20\rivm1246\hich\f1\dbch\f1\loch\f1\cf2\fs20\ul{\field{\*\fldinst HYPERLINK [id number] }{\fldrslt Problem/Plan - 4:}}\plain\f0\fs20\gskh9587\hich\f0\dbch\f0\loch\f0\fs20\ql\par  \'b7  {\*\bkmkstart [id number]}{\*\bkmkend lf02334997016}Problem: {\*\bkmkstart [id number]}{\*\bkmkend hq54341418378}Diabetes. \par  \'b7  {\*\bkmkstart [id number]}{\*\bkmkend fq14788261794}Plan: {\*\bkmkstart [id number]}{\*\bkmkend [id number]}Uses Metformin 500mg PO qD at home. \par  - C/w mISS while inpatient\par  - Continue holding home metformin while inpatient.\par  \par  \plain\f1\fs20\zfjt5330\hich\f1\dbch\f1\loch\f1\cf2\fs20\ul{\field{\*\fldinst HYPERLINK [id number] }{\fldrslt Problem/Plan - 5:}}\plain\f0\fs20\qajb1794\hich\f0\dbch\f0\loch\f0\fs20\ql\par  \'b7  {\*\bkmkstart [id number]}{\*\bkmkend [id number]}Problem: {\*\bkmkstart [id number]}{\*\bkmkend ks87350391151}History of DVT in adulthood. \par  \'b7  {\*\bkmkstart ix42261974257}{\*\bkmkend ly32999613945}Plan: {\*\bkmkstart [id number]}{\*\bkmkend oe78491419085}Pt reports hx of DVT many years ago, previously with IVC filter which has now been removed. Most recent DVT was in 02/2023. Reports   compliance with Xarelto. Last dose of Xarelto on 12/3. \par  - Trial xarelto 12/7 PM and monitor CBC on 12/8 AM prior to d/c\par  - Unable to get collateral from outpatient hematologist, pt does not have the correct name and contact info.\plain\f1\fs20\nhfz6118\hich\f1\dbch\f1\loch\f1\cf2\fs20\strike\plain\f0\fs20\djrg6542\hich\f0\dbch\f0\loch\f0\fs20\par  \par  \plain\f1\fs20\hwlt8542\hich\f1\dbch\f1\loch\f1\cf2\fs20\ul{\field{\*\fldinst HYPERLINK [id number] }{\fldrslt Problem/Plan - 6:}}\plain\f0\fs20\xjhv3140\hich\f0\dbch\f0\loch\f0\fs20\ql\par  \'b7  {\*\bkmkstart [id number]}{\*\bkmkend sa05991908689}Problem: {\*\bkmkstart [id number]}{\*\bkmkend vj08302840874}Hypomagnesemia. \par  \'b7  {\*\bkmkstart ac09340431492}{\*\bkmkend xx60174973055}Plan: {\*\bkmkstart ut91625016434}{\*\bkmkend nl75872846628}Mg of 1.7 on 12/6, repleated x1. Mg increased to 1.9 on 12/7.\par  - Monitor Mg level.\par  \par  \plain\f1\fs20\rmlj4484\hich\f1\dbch\f1\loch\f1\cf2\fs20\ul{\field{\*\fldinst HYPERLINK [id number] }{\fldrslt Problem/Plan - 7:}}\plain\f0\fs20\fykq8778\hich\f0\dbch\f0\loch\f0\fs20\ql\par  \'b7  {\*\bkmkstart pr48358624227}{\*\bkmkend se73328975286}Problem: {\*\bkmkstart yw57499284913}{\*\bkmkend sf30359472368}Prophylactic measure. \par  \'b7  {\*\bkmkstart ty82859772016}{\*\bkmkend [id number]}Plan: {\*\bkmkstart vg96546858471}{\*\bkmkend lx60039691934}F: None\par  E: replete K>4 Mg>2\par  N: NPO\par  D: SCDs (concern for bleed)\par  Dispo: RMF.\par  \par  \par  }

## 2023-12-08 NOTE — DISCHARGE NOTE NURSING/CASE MANAGEMENT/SOCIAL WORK - NSDCFUADDAPPT_GEN_ALL_CORE_FT
- Please follow up with Dr. De Souza (gastroenterology) on 12/26 at 3pm. Location: [address] [place] [address], [address] [facility], [place] [address]. Please call [phone] if you have any questions.   - Please follow up with your primary care doctor within 2 weeks after being discharged.  - Please follow up with Dr. De Souza (gastroenterology) on 12/26 at 3pm. Location: [address] [place] [address], [address] [facility], [place] [address]. Please call [phone] if you have any questions.   - Please follow up with your primary care doctor within 2 weeks after being discharged.

## 2023-12-08 NOTE — PHYSICAL THERAPY INITIAL EVALUATION ADULT - ADDITIONAL COMMENTS
Prior to admission given by pt and chart; pt reports living alone in an elevator apt, no steps to enter, indep with ADLs and mobility with cane. pt has HHA 5.5h for 7 days.

## 2023-12-12 DIAGNOSIS — Z86.718 PERSONAL HISTORY OF OTHER VENOUS THROMBOSIS AND EMBOLISM: ICD-10-CM

## 2023-12-12 DIAGNOSIS — E11.51 TYPE 2 DIABETES MELLITUS WITH DIABETIC PERIPHERAL ANGIOPATHY WITHOUT GANGRENE: ICD-10-CM

## 2023-12-12 DIAGNOSIS — A04.8 OTHER SPECIFIED BACTERIAL INTESTINAL INFECTIONS: ICD-10-CM

## 2023-12-12 DIAGNOSIS — I10 ESSENTIAL (PRIMARY) HYPERTENSION: ICD-10-CM

## 2023-12-12 DIAGNOSIS — J45.909 UNSPECIFIED ASTHMA, UNCOMPLICATED: ICD-10-CM

## 2023-12-12 DIAGNOSIS — K92.2 GASTROINTESTINAL HEMORRHAGE, UNSPECIFIED: ICD-10-CM

## 2023-12-12 DIAGNOSIS — D62 ACUTE POSTHEMORRHAGIC ANEMIA: ICD-10-CM

## 2023-12-12 DIAGNOSIS — E83.42 HYPOMAGNESEMIA: ICD-10-CM

## 2023-12-12 DIAGNOSIS — Z79.01 LONG TERM (CURRENT) USE OF ANTICOAGULANTS: ICD-10-CM

## 2023-12-12 DIAGNOSIS — E83.39 OTHER DISORDERS OF PHOSPHORUS METABOLISM: ICD-10-CM

## 2023-12-12 DIAGNOSIS — E11.9 TYPE 2 DIABETES MELLITUS WITHOUT COMPLICATIONS: ICD-10-CM

## 2023-12-12 DIAGNOSIS — D50.9 IRON DEFICIENCY ANEMIA, UNSPECIFIED: ICD-10-CM

## 2024-01-11 ENCOUNTER — APPOINTMENT (OUTPATIENT)
Dept: GASTROENTEROLOGY | Facility: CLINIC | Age: 74
End: 2024-01-11
Payer: MEDICARE

## 2024-01-11 VITALS
SYSTOLIC BLOOD PRESSURE: 130 MMHG | BODY MASS INDEX: 33.68 KG/M2 | HEART RATE: 60 BPM | DIASTOLIC BLOOD PRESSURE: 70 MMHG | RESPIRATION RATE: 16 BRPM | HEIGHT: 62 IN | TEMPERATURE: 98 F | WEIGHT: 183 LBS | OXYGEN SATURATION: 98 %

## 2024-01-11 DIAGNOSIS — K92.1 MELENA: ICD-10-CM

## 2024-01-11 PROCEDURE — 99215 OFFICE O/P EST HI 40 MIN: CPT

## 2024-01-11 NOTE — PHYSICAL EXAM
[Alert] : alert [No Acute Distress] : no acute distress [No Respiratory Distress] : no respiratory distress [No Acc Muscle Use] : no accessory muscle use [Oriented To Time, Place, And Person] : oriented to person, place, and time

## 2024-01-11 NOTE — HISTORY OF PRESENT ILLNESS
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

## 2024-01-11 NOTE — END OF VISIT
[] : Resident [FreeTextEntry3] : Agree w/ above. Hospital stay reviewed. This was patient's 3rd hospitalization for GI bleeding. Hx of gastric ulcer noted. Needs to be on A/C. Recommend H pylori testing today and schedule VCE. Will check labs today as well. Consider colonoscopy in the future, pt very opposed to it at this time.  [Time Spent: ___ minutes] : I have spent [unfilled] minutes of time on the encounter.

## 2024-01-12 LAB
ALBUMIN SERPL ELPH-MCNC: 4.3 G/DL
ALP BLD-CCNC: 101 U/L
ALT SERPL-CCNC: 7 U/L
ANION GAP SERPL CALC-SCNC: 12 MMOL/L
AST SERPL-CCNC: 14 U/L
BILIRUB SERPL-MCNC: 0.2 MG/DL
BUN SERPL-MCNC: 14 MG/DL
CALCIUM SERPL-MCNC: 8.8 MG/DL
CHLORIDE SERPL-SCNC: 101 MMOL/L
CO2 SERPL-SCNC: 25 MMOL/L
CREAT SERPL-MCNC: 0.69 MG/DL
EGFR: 92 ML/MIN/1.73M2
FERRITIN SERPL-MCNC: 33 NG/ML
GLUCOSE SERPL-MCNC: 84 MG/DL
HCT VFR BLD CALC: 32.6 %
HGB BLD-MCNC: 9.8 G/DL
IRON SATN MFR SERPL: 7 %
IRON SERPL-MCNC: 28 UG/DL
MCHC RBC-ENTMCNC: 23.8 PG
MCHC RBC-ENTMCNC: 30.1 GM/DL
MCV RBC AUTO: 79.3 FL
PLATELET # BLD AUTO: 231 K/UL
POTASSIUM SERPL-SCNC: 4.3 MMOL/L
PROT SERPL-MCNC: 7.8 G/DL
RBC # BLD: 4.11 M/UL
RBC # FLD: 18.7 %
SODIUM SERPL-SCNC: 138 MMOL/L
TIBC SERPL-MCNC: 397 UG/DL
UIBC SERPL-MCNC: 369 UG/DL
WBC # FLD AUTO: 5.3 K/UL

## 2024-01-12 RX ORDER — LISINOPRIL 40 MG/1
40 TABLET ORAL
Refills: 0 | Status: ACTIVE | COMMUNITY

## 2024-01-12 RX ORDER — RIVAROXABAN 20 MG/1
20 TABLET, FILM COATED ORAL
Refills: 0 | Status: ACTIVE | COMMUNITY

## 2024-01-12 RX ORDER — FAMOTIDINE 20 MG/1
20 TABLET, FILM COATED ORAL
Refills: 0 | Status: ACTIVE | COMMUNITY

## 2024-01-12 RX ORDER — METFORMIN HYDROCHLORIDE 1000 MG/1
1000 TABLET, COATED ORAL
Refills: 0 | Status: ACTIVE | COMMUNITY

## 2024-01-12 RX ORDER — AMLODIPINE BESYLATE 10 MG/1
10 TABLET ORAL
Refills: 0 | Status: ACTIVE | COMMUNITY

## 2024-01-16 ENCOUNTER — NON-APPOINTMENT (OUTPATIENT)
Age: 74
End: 2024-01-16

## 2024-01-16 LAB — UREA BREATH TEST QL: POSITIVE

## 2024-01-18 NOTE — PROGRESS NOTE ADULT - PROBLEM SELECTOR PLAN 4
Carbohydrate Counting for Diabetes Mellitus, Adult  Carbohydrate counting is a method of keeping track of how many carbohydrates you eat. Eating carbohydrates increases the amount of sugar (glucose) in the blood. Counting how many carbohydrates you eat improves how well you manage your blood glucose. This, in turn, helps you manage your diabetes.  Carbohydrates are measured in grams (g) per serving. It is important to know how many carbohydrates (in grams or by serving size) you can have in each meal. This is different for every person. A dietitian can help you make a meal plan and calculate how many carbohydrates you should have at each meal and snack.  What foods contain carbohydrates?  Carbohydrates are found in the following foods:  Grains, such as breads and cereals.  Dried beans and soy products.  Starchy vegetables, such as potatoes, peas, and corn.  Fruit and fruit juices.  Milk and yogurt.  Sweets and snack foods, such as cake, cookies, candy, chips, and soft drinks.  How do I count carbohydrates in foods?  There are two ways to count carbohydrates in food. You can read food labels or learn standard serving sizes of foods. You can use either of these methods or a combination of both.  Using the Nutrition Facts label  The Nutrition Facts list is included on the labels of almost all packaged foods and beverages in the [place]. It includes:  The serving size.  Information about nutrients in each serving, including the grams of carbohydrate per serving.  To use the Nutrition Facts, decide how many servings you will have. Then, multiply the number of servings by the number of carbohydrates per serving. The resulting number is the total grams of carbohydrates that you will be having.  Learning the standard serving sizes of foods  When you eat carbohydrate foods that are not packaged or do not include Nutrition Facts on the label, you need to measure the servings in order to count the grams of  carbohydrates.  Measure the foods that you will eat with a food scale or measuring cup, if needed.  Decide how many standard-size servings you will eat.  Multiply the number of servings by 15. For foods that contain carbohydrates, one serving equals 15 g of carbohydrates.  For example, if you eat 2 cups or 10 oz (300 g) of strawberries, you will have eaten 2 servings and 30 g of carbohydrates (2 servings x 15 g = 30 g).  For foods that have more than one food mixed, such as soups and casseroles, you must count the carbohydrates in each food that is included.  The following list contains standard serving sizes of common carbohydrate-rich foods. Each of these servings has about 15 g of carbohydrates:  1 slice of bread.  1 six-inch (15 cm) tortilla.  ? cup or 2 oz (53 g) cooked rice or pasta.  ½ cup or 3 oz (85 g) cooked or canned, drained and rinsed beans or lentils.  ½ cup or 3 oz (85 g) starchy vegetable, such as peas, corn, or squash.  ½ cup or 4 oz (120 g) hot cereal.  ½ cup or 3 oz (85 g) boiled or mashed potatoes, or ¼ or 3 oz (85 g) of a large baked potato.  ½ cup or 4 fl oz (118 mL) fruit juice.  1 cup or 8 fl oz (237 mL) milk.  1 small or 4 oz (106 g) apple.  ½ or 2 oz (63 g) of a medium banana.  1 cup or 5 oz (150 g) strawberries.  3 cups or 1 oz (28.3 g) popped popcorn.  What is an example of carbohydrate counting?  To calculate the grams of carbohydrates in this sample meal, follow the steps shown below.  Sample meal  3 oz (85 g) chicken breast.  ? cup or 4 oz (106 g) brown rice.  ½ cup or 3 oz (85 g) corn.  1 cup or 8 fl oz (237 mL) milk.  1 cup or 5 oz (150 g) strawberries with sugar-free whipped topping.  Carbohydrate calculation  Identify the foods that contain carbohydrates:  Rice.  Corn.  Milk.  Strawberries.  Calculate how many servings you have of each food:  2 servings rice.  1 serving corn.  1 serving milk.  1 serving strawberries.  Multiply each number of servings by 15  servings rice x 15  Hgb 8.2 on admission w/ low MCV. Unknown baseline. Per patient she has heard of having a low blood count in the past but does not know why.  - Iron panel suggests STARR g = 30 g.  1 serving corn x 15 g = 15 g.  1 serving milk x 15 g = 15 g.  1 serving strawberries x 15 g = 15 g.  Add together all of the amounts to find the total grams of carbohydrates eaten:  30 g + 15 g + 15 g + 15 g = 75 g of carbohydrates total.  What are tips for following this plan?  Shopping  Develop a meal plan and then make a shopping list.  Buy fresh and frozen vegetables, fresh and frozen fruit, dairy, eggs, beans, lentils, and whole grains.  Look at food labels. Choose foods that have more fiber and less sugar.  Avoid processed foods and foods with added sugars.  Meal planning  Aim to have the same number of grams of carbohydrates at each meal and for each snack time.  Plan to have regular, balanced meals and snacks.  Where to find more information  American Diabetes Association: diabetes.org  Centers for Disease Control and Prevention: cdc.gov  Academy of Nutrition and Dietetics: eatright.org  Association of Diabetes Care & Education Specialists: diabeteseducator.org  Summary  Carbohydrate counting is a method of keeping track of how many carbohydrates you eat.  Eating carbohydrates increases the amount of sugar (glucose) in your blood.  Counting how many carbohydrates you eat improves how well you manage your blood glucose. This helps you manage your diabetes.  A dietitian can help you make a meal plan and calculate how many carbohydrates you should have at each meal and snack.  This information is not intended to replace advice given to you by your health care provider. Make sure you discuss any questions you have with your health care provider.  Document Revised: 07/21/2021 Document Reviewed: 07/21/2021  Elsevier Patient Education © 2023 Elsevier Inc.

## 2024-01-26 ENCOUNTER — APPOINTMENT (OUTPATIENT)
Dept: GASTROENTEROLOGY | Facility: HOSPITAL | Age: 74
End: 2024-01-26

## 2024-01-26 ENCOUNTER — OUTPATIENT (OUTPATIENT)
Dept: OUTPATIENT SERVICES | Facility: HOSPITAL | Age: 74
LOS: 1 days | Discharge: HOME | End: 2024-01-26
Payer: MEDICARE

## 2024-01-26 DIAGNOSIS — Z98.891 HISTORY OF UTERINE SCAR FROM PREVIOUS SURGERY: Chronic | ICD-10-CM

## 2024-01-26 PROCEDURE — 45378 DIAGNOSTIC COLONOSCOPY: CPT

## 2024-01-26 PROCEDURE — 91110 GI TRC IMG INTRAL ESOPH-ILE: CPT | Mod: 26

## 2024-01-26 PROCEDURE — 43235 EGD DIAGNOSTIC BRUSH WASH: CPT

## 2024-01-26 PROCEDURE — 91110 GI TRC IMG INTRAL ESOPH-ILE: CPT

## 2024-01-31 DIAGNOSIS — K92.1 MELENA: ICD-10-CM

## 2024-01-31 DIAGNOSIS — R19.5 OTHER FECAL ABNORMALITIES: ICD-10-CM

## 2024-01-31 DIAGNOSIS — D50.9 IRON DEFICIENCY ANEMIA, UNSPECIFIED: ICD-10-CM

## 2024-01-31 DIAGNOSIS — Z86.718 PERSONAL HISTORY OF OTHER VENOUS THROMBOSIS AND EMBOLISM: ICD-10-CM

## 2024-01-31 DIAGNOSIS — Z79.01 LONG TERM (CURRENT) USE OF ANTICOAGULANTS: ICD-10-CM

## 2024-01-31 DIAGNOSIS — I10 ESSENTIAL (PRIMARY) HYPERTENSION: ICD-10-CM

## 2024-01-31 DIAGNOSIS — I73.9 PERIPHERAL VASCULAR DISEASE, UNSPECIFIED: ICD-10-CM

## 2024-02-05 ENCOUNTER — APPOINTMENT (OUTPATIENT)
Dept: GASTROENTEROLOGY | Facility: CLINIC | Age: 74
End: 2024-02-05
Payer: MEDICARE

## 2024-02-05 DIAGNOSIS — K59.09 OTHER CONSTIPATION: ICD-10-CM

## 2024-02-05 DIAGNOSIS — A04.8 OTHER SPECIFIED BACTERIAL INTESTINAL INFECTIONS: ICD-10-CM

## 2024-02-05 PROCEDURE — 99443: CPT

## 2024-02-05 RX ORDER — DOXYCYCLINE HYCLATE 100 MG/1
100 TABLET ORAL
Qty: 28 | Refills: 0 | Status: ACTIVE | COMMUNITY
Start: 2024-02-05 | End: 1900-01-01

## 2024-02-05 RX ORDER — METRONIDAZOLE 250 MG/1
250 TABLET ORAL EVERY 6 HOURS
Qty: 56 | Refills: 0 | Status: ACTIVE | COMMUNITY
Start: 2024-02-05 | End: 1900-01-01

## 2024-02-05 RX ORDER — OMEPRAZOLE 40 MG/1
40 CAPSULE, DELAYED RELEASE ORAL TWICE DAILY
Qty: 28 | Refills: 0 | Status: ACTIVE | COMMUNITY
Start: 2024-02-05 | End: 1900-01-01

## 2024-02-05 RX ORDER — POLYETHYLENE GLYCOL 3350 17 G/17G
17 POWDER, FOR SOLUTION ORAL DAILY
Qty: 28 | Refills: 5 | Status: ACTIVE | COMMUNITY
Start: 2024-02-05 | End: 1900-01-01

## 2024-02-05 RX ORDER — BISMUTH SUBSALICYLATE 262 MG/1
262 TABLET, CHEWABLE ORAL
Qty: 56 | Refills: 0 | Status: ACTIVE | COMMUNITY
Start: 2024-02-05 | End: 1900-01-01

## 2024-02-22 ENCOUNTER — APPOINTMENT (OUTPATIENT)
Dept: GASTROENTEROLOGY | Facility: CLINIC | Age: 74
End: 2024-02-22

## 2024-05-17 ENCOUNTER — APPOINTMENT (OUTPATIENT)
Dept: INTERNAL MEDICINE | Facility: CLINIC | Age: 74
End: 2024-05-17

## 2024-07-10 ENCOUNTER — LABORATORY RESULT (OUTPATIENT)
Age: 74
End: 2024-07-10

## 2024-07-10 ENCOUNTER — APPOINTMENT (OUTPATIENT)
Dept: RADIOLOGY | Facility: CLINIC | Age: 74
End: 2024-07-10

## 2024-07-10 ENCOUNTER — APPOINTMENT (OUTPATIENT)
Dept: INTERNAL MEDICINE | Facility: CLINIC | Age: 74
End: 2024-07-10
Payer: MEDICARE

## 2024-07-10 VITALS
SYSTOLIC BLOOD PRESSURE: 157 MMHG | OXYGEN SATURATION: 100 % | BODY MASS INDEX: 34.04 KG/M2 | DIASTOLIC BLOOD PRESSURE: 78 MMHG | TEMPERATURE: 97.2 F | WEIGHT: 185 LBS | HEART RATE: 62 BPM | HEIGHT: 62 IN

## 2024-07-10 DIAGNOSIS — E11.9 TYPE 2 DIABETES MELLITUS W/OUT COMPLICATIONS: ICD-10-CM

## 2024-07-10 DIAGNOSIS — J45.909 UNSPECIFIED ASTHMA, UNCOMPLICATED: ICD-10-CM

## 2024-07-10 DIAGNOSIS — K21.9 GASTRO-ESOPHAGEAL REFLUX DISEASE W/OUT ESOPHAGITIS: ICD-10-CM

## 2024-07-10 DIAGNOSIS — I10 ESSENTIAL (PRIMARY) HYPERTENSION: ICD-10-CM

## 2024-07-10 DIAGNOSIS — Z13.820 ENCOUNTER FOR SCREENING FOR OSTEOPOROSIS: ICD-10-CM

## 2024-07-10 DIAGNOSIS — Z13.220 ENCOUNTER FOR SCREENING FOR LIPOID DISORDERS: ICD-10-CM

## 2024-07-10 PROCEDURE — 99204 OFFICE O/P NEW MOD 45 MIN: CPT

## 2024-07-10 PROCEDURE — G2211 COMPLEX E/M VISIT ADD ON: CPT

## 2024-07-10 PROCEDURE — 36415 COLL VENOUS BLD VENIPUNCTURE: CPT

## 2024-07-10 RX ORDER — DUPILUMAB 200 MG/1.14ML
INJECTION, SOLUTION SUBCUTANEOUS
Refills: 0 | Status: ACTIVE | COMMUNITY

## 2024-07-10 RX ORDER — ALBUTEROL SULFATE 90 UG/1
108 (90 BASE) INHALANT RESPIRATORY (INHALATION)
Qty: 1 | Refills: 1 | Status: ACTIVE | COMMUNITY
Start: 2024-07-10 | End: 1900-01-01

## 2024-07-10 RX ORDER — RIVAROXABAN 20 MG/1
20 TABLET, FILM COATED ORAL DAILY
Qty: 1 | Refills: 0 | Status: ACTIVE | COMMUNITY
Start: 2024-07-10 | End: 1900-01-01

## 2024-07-10 RX ORDER — FLUTICASONE FUROATE, UMECLIDINIUM BROMIDE AND VILANTEROL TRIFENATATE 200; 62.5; 25 UG/1; UG/1; UG/1
200-62.5-25 POWDER RESPIRATORY (INHALATION) DAILY
Qty: 1 | Refills: 11 | Status: ACTIVE | COMMUNITY
Start: 2024-07-10 | End: 1900-01-01

## 2024-07-17 DIAGNOSIS — E55.9 VITAMIN D DEFICIENCY, UNSPECIFIED: ICD-10-CM

## 2024-07-17 DIAGNOSIS — D50.9 IRON DEFICIENCY ANEMIA, UNSPECIFIED: ICD-10-CM

## 2024-07-17 LAB
25(OH)D3 SERPL-MCNC: 10 NG/ML
ALP BLD-CCNC: 88 U/L
ALT SERPL-CCNC: 8 U/L
ANION GAP SERPL CALC-SCNC: 12 MMOL/L
APPEARANCE: CLEAR
AST SERPL-CCNC: 15 U/L
BASOPHILS # BLD AUTO: 0.02 K/UL
BASOPHILS NFR BLD AUTO: 0.4 %
BILIRUB SERPL-MCNC: 0.2 MG/DL
BILIRUBIN URINE: NEGATIVE
BLOOD URINE: NEGATIVE
BUN SERPL-MCNC: 10 MG/DL
CALCIUM SERPL-MCNC: 8.7 MG/DL
CHLORIDE SERPL-SCNC: 108 MMOL/L
CHOLEST SERPL-MCNC: 211 MG/DL
CO2 SERPL-SCNC: 23 MMOL/L
COLOR: NORMAL
CREAT SERPL-MCNC: 0.67 MG/DL
CREAT SPEC-SCNC: 141 MG/DL
EGFR: 92 ML/MIN/1.73M2
EOSINOPHIL # BLD AUTO: 0.18 K/UL
EOSINOPHIL NFR BLD AUTO: 3.3 %
ESTIMATED AVERAGE GLUCOSE: 123 MG/DL
GLUCOSE QUALITATIVE U: NEGATIVE
GLUCOSE SERPL-MCNC: 145 MG/DL
HBA1C MFR BLD HPLC: 5.9 %
HCT VFR BLD CALC: 29.3 %
HDLC SERPL-MCNC: 39 MG/DL
HGB BLD-MCNC: 8.1 G/DL
IMM GRANULOCYTES NFR BLD AUTO: 0.5 %
KETONES URINE: NEGATIVE
LDLC SERPL CALC-MCNC: 129 MG/DL
LEUKOCYTE ESTERASE URINE: NEGATIVE
LYMPHOCYTES # BLD AUTO: 1.12 K/UL
LYMPHOCYTES NFR BLD AUTO: 20.5 %
MAN DIFF?: NORMAL
MCHC RBC-ENTMCNC: 20 PG
MCHC RBC-ENTMCNC: 27.6 GM/DL
MCV RBC AUTO: 72.3 FL
MICROALBUMIN 24H UR DL<=1MG/L-MCNC: 1.8 MG/DL
MICROALBUMIN/CREAT 24H UR-RTO: 13 MG/G
MONOCYTES # BLD AUTO: 0.62 K/UL
MONOCYTES NFR BLD AUTO: 11.3 %
NEUTROPHILS # BLD AUTO: 3.5 K/UL
NEUTROPHILS NFR BLD AUTO: 64 %
NITRITE URINE: NEGATIVE
NONHDLC SERPL-MCNC: 172 MG/DL
PH URINE: 6
PLATELET # BLD AUTO: 207 K/UL
POTASSIUM SERPL-SCNC: 4 MMOL/L
PROT SERPL-MCNC: 7.4 G/DL
PROTEIN URINE: NEGATIVE
RBC # BLD: 4.05 M/UL
RBC # FLD: 18.4 %
SPECIFIC GRAVITY URINE: 1.01
TRIGL SERPL-MCNC: 240 MG/DL
TSH SERPL-ACNC: 0.76 UIU/ML
UROBILINOGEN URINE: NORMAL
VIT B12 SERPL-MCNC: 299 PG/ML
WBC # FLD AUTO: 5.47 K/UL

## 2024-07-17 RX ORDER — FERROUS SULFATE 325(65) MG
325 (65 FE) TABLET ORAL DAILY
Qty: 90 | Refills: 1 | Status: ACTIVE | COMMUNITY
Start: 2024-07-17 | End: 1900-01-01

## 2024-07-17 RX ORDER — CHOLECALCIFEROL (VITAMIN D3) 1250 MCG
1.25 MG CAPSULE ORAL
Qty: 12 | Refills: 0 | Status: ACTIVE | COMMUNITY
Start: 2024-07-17 | End: 1900-01-01

## 2024-07-22 ENCOUNTER — LABORATORY RESULT (OUTPATIENT)
Age: 74
End: 2024-07-22

## 2024-07-22 ENCOUNTER — APPOINTMENT (OUTPATIENT)
Dept: GASTROENTEROLOGY | Facility: CLINIC | Age: 74
End: 2024-07-22
Payer: MEDICARE

## 2024-07-22 VITALS
TEMPERATURE: 96.2 F | HEIGHT: 62 IN | DIASTOLIC BLOOD PRESSURE: 70 MMHG | WEIGHT: 181 LBS | OXYGEN SATURATION: 98 % | SYSTOLIC BLOOD PRESSURE: 142 MMHG | HEART RATE: 81 BPM | BODY MASS INDEX: 33.31 KG/M2

## 2024-07-22 DIAGNOSIS — A04.8 OTHER SPECIFIED BACTERIAL INTESTINAL INFECTIONS: ICD-10-CM

## 2024-07-22 DIAGNOSIS — K92.1 MELENA: ICD-10-CM

## 2024-07-22 PROCEDURE — 99215 OFFICE O/P EST HI 40 MIN: CPT

## 2024-07-22 PROCEDURE — G2211 COMPLEX E/M VISIT ADD ON: CPT

## 2024-07-22 RX ORDER — OMEPRAZOLE 40 MG/1
40 CAPSULE, DELAYED RELEASE ORAL TWICE DAILY
Qty: 120 | Refills: 3 | Status: ACTIVE | COMMUNITY
Start: 2024-07-22 | End: 1900-01-01

## 2024-07-22 NOTE — ASSESSMENT
[FreeTextEntry1] : 74F with PMH of HTN, DVT (on Xarelto), HTN, PAD, STARR, recent admission at [facility] 12/2023 for melena, now presenting with black liquid stools x 5-7 days.   Presentation concerning for UGIB in setting of prior GI bleeds and known H. pylori infection.   Plan:  - f/u CBC, CMP, iron sat, and ferritin  - start omeprazole 40 mg PO BID  - will hold off on treating H. pylori infection at this time and instead focus on immediate concern for UGIB though patient understands that she may continue to have GI bleeds if H. pylori infection goes untreated long-term  - anticipatory guidance provided - patient understands that if she should experience worsening bleeding, low blood pressure, a fast heart rate, extreme weakness, shortness of breath, or syncopal symptoms, should call 911 to go to the ER via ambulance - advised patient to hold home Xarelto today  - will call patient tomorrow to go over lab results and discuss next steps

## 2024-07-22 NOTE — END OF VISIT
[] : Fellow [FreeTextEntry3] : Agree w/ above. Pt never took H pylori tx. Presentation highly c/f melena. VSS. Pt denies orthostasis, feels safe going home, and has son and an aide at home. Offered ED, but pt would rather we check CBC and f/u results in AM. If low will send to ED. Pt understands ED precautions and will call ambulance as needed. We will start BID PPI in the interim.  [Time Spent: ___ minutes] : I have spent [unfilled] minutes of time on the encounter.

## 2024-07-22 NOTE — HISTORY OF PRESENT ILLNESS
[FreeTextEntry1] : 74F with PMH of HTN, DVT (on Xarelto), HTN, PAD, STARR, recent admission at [facility] 12/2023 for melena, now presenting with black liquid stools x 5-7 days. Patient states that her PCP checked her iron levels and told her they were low, so she started oral iron supplementation. About a day after the oral iron supplementation, she started having what sounds like melena up to 3 times per day. Also took a few doses of bismuth at this time. Of note patient, was last seen in GI office via telemedicine encounter to discuss VCE results and need for H. pylori treatment. Patient says that she has not yet started the H. pylori treatment because she was a little overwhelmed by the amount of medications she must take each day.   Additional collateral obtained from prior notes:  - Has history of bleeding ulcer a year ago at [facility] prior to her [facility] hospitalization in December 2023.  - She takes MiraLAX everyday with consistent bowel movements everyday.  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 Source of bleeding thought to be possibly from the cecal AVM, but further workup was recommended. - Current meds: Xarelto, Miralax, Lisinopril, Amlodipine, bismuth, famotidine   Patient states that she is anxious she is bleeding given the multiple episodes of melena. Discussed possibility of going to the ER at this time vs. checking labs and starting omeprazole 40 mg PO BID out-patient, and patient states that she would prefer the out-patient approach at this time with the understanding that she may still need to go to the ER. Denies any SOB, extreme weakness, heart palpitations, increased heart rate or syncopal symptoms but understands that if she should experience these, delon call 911 to go to the ER via ambulance.

## 2024-08-09 ENCOUNTER — APPOINTMENT (OUTPATIENT)
Dept: GASTROENTEROLOGY | Facility: HOSPITAL | Age: 74
End: 2024-08-09

## 2024-09-18 RX ORDER — NUT.TX.GLUC.INTOLER,LAC-FR,SOY
LIQUID (ML) ORAL
Qty: 1 | Refills: 0 | Status: ACTIVE | COMMUNITY
Start: 2024-09-18 | End: 1900-01-01

## 2024-10-08 ENCOUNTER — APPOINTMENT (OUTPATIENT)
Dept: INTERNAL MEDICINE | Facility: CLINIC | Age: 74
End: 2024-10-08

## 2024-10-08 VITALS
WEIGHT: 169.09 LBS | DIASTOLIC BLOOD PRESSURE: 88 MMHG | RESPIRATION RATE: 18 BRPM | SYSTOLIC BLOOD PRESSURE: 157 MMHG | HEART RATE: 77 BPM | OXYGEN SATURATION: 98 % | TEMPERATURE: 100 F

## 2024-10-08 PROCEDURE — 99285 EMERGENCY DEPT VISIT HI MDM: CPT

## 2024-10-09 ENCOUNTER — INPATIENT (INPATIENT)
Facility: HOSPITAL | Age: 74
LOS: 0 days | Discharge: HOME | DRG: 312 | End: 2024-10-09
Attending: STUDENT IN AN ORGANIZED HEALTH CARE EDUCATION/TRAINING PROGRAM | Admitting: STUDENT IN AN ORGANIZED HEALTH CARE EDUCATION/TRAINING PROGRAM
Payer: MEDICARE

## 2024-10-09 ENCOUNTER — TRANSCRIPTION ENCOUNTER (OUTPATIENT)
Age: 74
End: 2024-10-09

## 2024-10-09 VITALS
TEMPERATURE: 98 F | RESPIRATION RATE: 17 BRPM | OXYGEN SATURATION: 98 % | HEART RATE: 87 BPM | SYSTOLIC BLOOD PRESSURE: 165 MMHG | DIASTOLIC BLOOD PRESSURE: 84 MMHG

## 2024-10-09 DIAGNOSIS — Z98.891 HISTORY OF UTERINE SCAR FROM PREVIOUS SURGERY: Chronic | ICD-10-CM

## 2024-10-09 DIAGNOSIS — D64.9 ANEMIA, UNSPECIFIED: ICD-10-CM

## 2024-10-09 DIAGNOSIS — Z86.718 PERSONAL HISTORY OF OTHER VENOUS THROMBOSIS AND EMBOLISM: ICD-10-CM

## 2024-10-09 DIAGNOSIS — R55 SYNCOPE AND COLLAPSE: ICD-10-CM

## 2024-10-09 DIAGNOSIS — J45.909 UNSPECIFIED ASTHMA, UNCOMPLICATED: ICD-10-CM

## 2024-10-09 DIAGNOSIS — Z78.9 OTHER SPECIFIED HEALTH STATUS: ICD-10-CM

## 2024-10-09 DIAGNOSIS — E11.9 TYPE 2 DIABETES MELLITUS WITHOUT COMPLICATIONS: ICD-10-CM

## 2024-10-09 DIAGNOSIS — I10 ESSENTIAL (PRIMARY) HYPERTENSION: ICD-10-CM

## 2024-10-09 LAB
A1C WITH ESTIMATED AVERAGE GLUCOSE RESULT: 5.1 % — SIGNIFICANT CHANGE UP (ref 4–5.6)
ADD ON TEST-SPECIMEN IN LAB: SIGNIFICANT CHANGE UP
ADD ON TEST-SPECIMEN IN LAB: SIGNIFICANT CHANGE UP
ALBUMIN SERPL ELPH-MCNC: 3.6 G/DL — SIGNIFICANT CHANGE UP (ref 3.3–5)
ALP SERPL-CCNC: 73 U/L — SIGNIFICANT CHANGE UP (ref 40–120)
ALT FLD-CCNC: 9 U/L — LOW (ref 10–45)
ANION GAP SERPL CALC-SCNC: 10 MMOL/L — SIGNIFICANT CHANGE UP (ref 5–17)
ANION GAP SERPL CALC-SCNC: 13 MMOL/L — SIGNIFICANT CHANGE UP (ref 5–17)
ANISOCYTOSIS BLD QL: SLIGHT — SIGNIFICANT CHANGE UP
AST SERPL-CCNC: 16 U/L — SIGNIFICANT CHANGE UP (ref 10–40)
BASOPHILS # BLD AUTO: 0 K/UL — SIGNIFICANT CHANGE UP (ref 0–0.2)
BASOPHILS # BLD AUTO: 0.01 K/UL — SIGNIFICANT CHANGE UP (ref 0–0.2)
BASOPHILS NFR BLD AUTO: 0 % — SIGNIFICANT CHANGE UP (ref 0–2)
BASOPHILS NFR BLD AUTO: 0.2 % — SIGNIFICANT CHANGE UP (ref 0–2)
BILIRUB DIRECT SERPL-MCNC: <0.2 MG/DL — SIGNIFICANT CHANGE UP (ref 0–0.3)
BILIRUB INDIRECT FLD-MCNC: SIGNIFICANT CHANGE UP MG/DL (ref 0.2–1)
BILIRUB SERPL-MCNC: <0.2 MG/DL — SIGNIFICANT CHANGE UP (ref 0.2–1.2)
BLD GP AB SCN SERPL QL: NEGATIVE — SIGNIFICANT CHANGE UP
BUN SERPL-MCNC: 18 MG/DL — SIGNIFICANT CHANGE UP (ref 7–23)
BUN SERPL-MCNC: 18 MG/DL — SIGNIFICANT CHANGE UP (ref 7–23)
BURR CELLS BLD QL SMEAR: PRESENT — SIGNIFICANT CHANGE UP
CALCIUM SERPL-MCNC: 8.8 MG/DL — SIGNIFICANT CHANGE UP (ref 8.4–10.5)
CALCIUM SERPL-MCNC: 9 MG/DL — SIGNIFICANT CHANGE UP (ref 8.4–10.5)
CHLORIDE SERPL-SCNC: 110 MMOL/L — HIGH (ref 96–108)
CHLORIDE SERPL-SCNC: 112 MMOL/L — HIGH (ref 96–108)
CHOLEST SERPL-MCNC: 165 MG/DL — SIGNIFICANT CHANGE UP
CO2 SERPL-SCNC: 22 MMOL/L — SIGNIFICANT CHANGE UP (ref 22–31)
CO2 SERPL-SCNC: 24 MMOL/L — SIGNIFICANT CHANGE UP (ref 22–31)
CREAT SERPL-MCNC: 0.82 MG/DL — SIGNIFICANT CHANGE UP (ref 0.5–1.3)
CREAT SERPL-MCNC: 0.87 MG/DL — SIGNIFICANT CHANGE UP (ref 0.5–1.3)
DACRYOCYTES BLD QL SMEAR: SLIGHT — SIGNIFICANT CHANGE UP
EGFR: 70 ML/MIN/1.73M2 — SIGNIFICANT CHANGE UP
EGFR: 75 ML/MIN/1.73M2 — SIGNIFICANT CHANGE UP
ELLIPTOCYTES BLD QL SMEAR: SLIGHT — SIGNIFICANT CHANGE UP
EOSINOPHIL # BLD AUTO: 0.17 K/UL — SIGNIFICANT CHANGE UP (ref 0–0.5)
EOSINOPHIL # BLD AUTO: 0.22 K/UL — SIGNIFICANT CHANGE UP (ref 0–0.5)
EOSINOPHIL NFR BLD AUTO: 2.9 % — SIGNIFICANT CHANGE UP (ref 0–6)
EOSINOPHIL NFR BLD AUTO: 4.7 % — SIGNIFICANT CHANGE UP (ref 0–6)
ESTIMATED AVERAGE GLUCOSE: 100 MG/DL — SIGNIFICANT CHANGE UP (ref 68–114)
FOLATE SERPL-MCNC: 12.4 NG/ML — SIGNIFICANT CHANGE UP
GIANT PLATELETS BLD QL SMEAR: PRESENT — SIGNIFICANT CHANGE UP
GLUCOSE BLDC GLUCOMTR-MCNC: 117 MG/DL — HIGH (ref 70–99)
GLUCOSE BLDC GLUCOMTR-MCNC: 86 MG/DL — SIGNIFICANT CHANGE UP (ref 70–99)
GLUCOSE SERPL-MCNC: 82 MG/DL — SIGNIFICANT CHANGE UP (ref 70–99)
GLUCOSE SERPL-MCNC: 89 MG/DL — SIGNIFICANT CHANGE UP (ref 70–99)
HCT VFR BLD CALC: 30.7 % — LOW (ref 34.5–45)
HCT VFR BLD CALC: 31.4 % — LOW (ref 34.5–45)
HDLC SERPL-MCNC: 35 MG/DL — LOW
HGB BLD-MCNC: 9.4 G/DL — LOW (ref 11.5–15.5)
HGB BLD-MCNC: 9.4 G/DL — LOW (ref 11.5–15.5)
HYPOCHROMIA BLD QL: SLIGHT — SIGNIFICANT CHANGE UP
IMM GRANULOCYTES NFR BLD AUTO: 0.2 % — SIGNIFICANT CHANGE UP (ref 0–0.9)
LIPID PNL WITH DIRECT LDL SERPL: 103 MG/DL — HIGH
LYMPHOCYTES # BLD AUTO: 1.81 K/UL — SIGNIFICANT CHANGE UP (ref 1–3.3)
LYMPHOCYTES # BLD AUTO: 1.91 K/UL — SIGNIFICANT CHANGE UP (ref 1–3.3)
LYMPHOCYTES # BLD AUTO: 30.8 % — SIGNIFICANT CHANGE UP (ref 13–44)
LYMPHOCYTES # BLD AUTO: 40.7 % — SIGNIFICANT CHANGE UP (ref 13–44)
MAGNESIUM SERPL-MCNC: 1.6 MG/DL — SIGNIFICANT CHANGE UP (ref 1.6–2.6)
MAGNESIUM SERPL-MCNC: 1.6 MG/DL — SIGNIFICANT CHANGE UP (ref 1.6–2.6)
MANUAL SMEAR VERIFICATION: SIGNIFICANT CHANGE UP
MCHC RBC-ENTMCNC: 23.1 PG — LOW (ref 27–34)
MCHC RBC-ENTMCNC: 23.4 PG — LOW (ref 27–34)
MCHC RBC-ENTMCNC: 29.9 GM/DL — LOW (ref 32–36)
MCHC RBC-ENTMCNC: 30.6 GM/DL — LOW (ref 32–36)
MCV RBC AUTO: 76.6 FL — LOW (ref 80–100)
MCV RBC AUTO: 77.1 FL — LOW (ref 80–100)
MICROCYTES BLD QL: SLIGHT — SIGNIFICANT CHANGE UP
MONOCYTES # BLD AUTO: 0.39 K/UL — SIGNIFICANT CHANGE UP (ref 0–0.9)
MONOCYTES # BLD AUTO: 0.39 K/UL — SIGNIFICANT CHANGE UP (ref 0–0.9)
MONOCYTES NFR BLD AUTO: 6.7 % — SIGNIFICANT CHANGE UP (ref 2–14)
MONOCYTES NFR BLD AUTO: 8.3 % — SIGNIFICANT CHANGE UP (ref 2–14)
NEUTROPHILS # BLD AUTO: 2.15 K/UL — SIGNIFICANT CHANGE UP (ref 1.8–7.4)
NEUTROPHILS # BLD AUTO: 3.5 K/UL — SIGNIFICANT CHANGE UP (ref 1.8–7.4)
NEUTROPHILS NFR BLD AUTO: 45.9 % — SIGNIFICANT CHANGE UP (ref 43–77)
NEUTROPHILS NFR BLD AUTO: 59.6 % — SIGNIFICANT CHANGE UP (ref 43–77)
NON HDL CHOLESTEROL: 130 MG/DL — HIGH
NRBC # BLD: 0 /100 WBCS — SIGNIFICANT CHANGE UP (ref 0–0)
NT-PROBNP SERPL-SCNC: 394 PG/ML — HIGH (ref 0–300)
OVALOCYTES BLD QL SMEAR: SLIGHT — SIGNIFICANT CHANGE UP
PLAT MORPH BLD: ABNORMAL
PLATELET # BLD AUTO: 220 K/UL — SIGNIFICANT CHANGE UP (ref 150–400)
PLATELET # BLD AUTO: 228 K/UL — SIGNIFICANT CHANGE UP (ref 150–400)
POIKILOCYTOSIS BLD QL AUTO: SIGNIFICANT CHANGE UP
POTASSIUM SERPL-MCNC: 4 MMOL/L — SIGNIFICANT CHANGE UP (ref 3.5–5.3)
POTASSIUM SERPL-MCNC: 4.8 MMOL/L — SIGNIFICANT CHANGE UP (ref 3.5–5.3)
POTASSIUM SERPL-MCNC: SIGNIFICANT CHANGE UP (ref 3.5–5.3)
POTASSIUM SERPL-SCNC: 4 MMOL/L — SIGNIFICANT CHANGE UP (ref 3.5–5.3)
POTASSIUM SERPL-SCNC: 4.8 MMOL/L — SIGNIFICANT CHANGE UP (ref 3.5–5.3)
POTASSIUM SERPL-SCNC: SIGNIFICANT CHANGE UP (ref 3.5–5.3)
PROT SERPL-MCNC: 7.6 G/DL — SIGNIFICANT CHANGE UP (ref 6–8.3)
RBC # BLD: 4.01 M/UL — SIGNIFICANT CHANGE UP (ref 3.8–5.2)
RBC # BLD: 4.07 M/UL — SIGNIFICANT CHANGE UP (ref 3.8–5.2)
RBC # FLD: 22.8 % — HIGH (ref 10.3–14.5)
RBC # FLD: 23.2 % — HIGH (ref 10.3–14.5)
RBC BLD AUTO: ABNORMAL
RH IG SCN BLD-IMP: POSITIVE — SIGNIFICANT CHANGE UP
SCHISTOCYTES BLD QL AUTO: SLIGHT — SIGNIFICANT CHANGE UP
SMUDGE CELLS # BLD: PRESENT — SIGNIFICANT CHANGE UP
SODIUM SERPL-SCNC: 145 MMOL/L — SIGNIFICANT CHANGE UP (ref 135–145)
SODIUM SERPL-SCNC: 146 MMOL/L — HIGH (ref 135–145)
TRIGL SERPL-MCNC: 155 MG/DL — HIGH
TROPONIN T, HIGH SENSITIVITY RESULT: 7 NG/L — SIGNIFICANT CHANGE UP (ref 0–51)
TSH SERPL-MCNC: 0.52 UIU/ML — SIGNIFICANT CHANGE UP (ref 0.27–4.2)
TSH SERPL-MCNC: 0.9 UIU/ML — SIGNIFICANT CHANGE UP (ref 0.27–4.2)
VIT B12 SERPL-MCNC: 404 PG/ML — SIGNIFICANT CHANGE UP (ref 232–1245)
WBC # BLD: 4.69 K/UL — SIGNIFICANT CHANGE UP (ref 3.8–10.5)
WBC # BLD: 5.88 K/UL — SIGNIFICANT CHANGE UP (ref 3.8–10.5)
WBC # FLD AUTO: 4.69 K/UL — SIGNIFICANT CHANGE UP (ref 3.8–10.5)
WBC # FLD AUTO: 5.88 K/UL — SIGNIFICANT CHANGE UP (ref 3.8–10.5)

## 2024-10-09 PROCEDURE — 70450 CT HEAD/BRAIN W/O DYE: CPT | Mod: 26,MC

## 2024-10-09 PROCEDURE — 82746 ASSAY OF FOLIC ACID SERUM: CPT

## 2024-10-09 PROCEDURE — 86900 BLOOD TYPING SEROLOGIC ABO: CPT

## 2024-10-09 PROCEDURE — 71045 X-RAY EXAM CHEST 1 VIEW: CPT

## 2024-10-09 PROCEDURE — 82607 VITAMIN B-12: CPT

## 2024-10-09 PROCEDURE — 80048 BASIC METABOLIC PNL TOTAL CA: CPT

## 2024-10-09 PROCEDURE — 82248 BILIRUBIN DIRECT: CPT

## 2024-10-09 PROCEDURE — 80061 LIPID PANEL: CPT

## 2024-10-09 PROCEDURE — 36415 COLL VENOUS BLD VENIPUNCTURE: CPT

## 2024-10-09 PROCEDURE — 82962 GLUCOSE BLOOD TEST: CPT

## 2024-10-09 PROCEDURE — 84132 ASSAY OF SERUM POTASSIUM: CPT

## 2024-10-09 PROCEDURE — 84484 ASSAY OF TROPONIN QUANT: CPT

## 2024-10-09 PROCEDURE — 99285 EMERGENCY DEPT VISIT HI MDM: CPT | Mod: 25

## 2024-10-09 PROCEDURE — 85025 COMPLETE CBC W/AUTO DIFF WBC: CPT

## 2024-10-09 PROCEDURE — 93010 ELECTROCARDIOGRAM REPORT: CPT

## 2024-10-09 PROCEDURE — 71045 X-RAY EXAM CHEST 1 VIEW: CPT | Mod: 26

## 2024-10-09 PROCEDURE — 70486 CT MAXILLOFACIAL W/O DYE: CPT | Mod: 26

## 2024-10-09 PROCEDURE — 70486 CT MAXILLOFACIAL W/O DYE: CPT | Mod: MC

## 2024-10-09 PROCEDURE — 83880 ASSAY OF NATRIURETIC PEPTIDE: CPT

## 2024-10-09 PROCEDURE — 83036 HEMOGLOBIN GLYCOSYLATED A1C: CPT

## 2024-10-09 PROCEDURE — 86901 BLOOD TYPING SEROLOGIC RH(D): CPT

## 2024-10-09 PROCEDURE — 83735 ASSAY OF MAGNESIUM: CPT

## 2024-10-09 PROCEDURE — 86850 RBC ANTIBODY SCREEN: CPT

## 2024-10-09 PROCEDURE — 84443 ASSAY THYROID STIM HORMONE: CPT

## 2024-10-09 PROCEDURE — 80053 COMPREHEN METABOLIC PANEL: CPT

## 2024-10-09 PROCEDURE — 99223 1ST HOSP IP/OBS HIGH 75: CPT

## 2024-10-09 PROCEDURE — 70450 CT HEAD/BRAIN W/O DYE: CPT | Mod: MC

## 2024-10-09 PROCEDURE — 93005 ELECTROCARDIOGRAM TRACING: CPT

## 2024-10-09 RX ORDER — INSULIN LISPRO 100/ML
VIAL (ML) SUBCUTANEOUS
Refills: 0 | Status: DISCONTINUED | OUTPATIENT
Start: 2024-10-09 | End: 2024-10-09

## 2024-10-09 RX ORDER — PSYLLIUM HUSK 0.4 G
400 CAPSULE ORAL ONCE
Refills: 0 | Status: COMPLETED | OUTPATIENT
Start: 2024-10-09 | End: 2024-10-09

## 2024-10-09 RX ORDER — ALCOHOL ANTISEPTIC PADS
25 PADS, MEDICATED (EA) TOPICAL ONCE
Refills: 0 | Status: DISCONTINUED | OUTPATIENT
Start: 2024-10-09 | End: 2024-10-09

## 2024-10-09 RX ORDER — ALCOHOL ANTISEPTIC PADS
15 PADS, MEDICATED (EA) TOPICAL ONCE
Refills: 0 | Status: DISCONTINUED | OUTPATIENT
Start: 2024-10-09 | End: 2024-10-09

## 2024-10-09 RX ORDER — PANTOPRAZOLE SODIUM 40 MG/1
40 TABLET, DELAYED RELEASE ORAL
Refills: 0 | Status: DISCONTINUED | OUTPATIENT
Start: 2024-10-09 | End: 2024-10-09

## 2024-10-09 RX ORDER — GLUCAGON INJECTION, SOLUTION 0.5 MG/.1ML
1 INJECTION, SOLUTION SUBCUTANEOUS ONCE
Refills: 0 | Status: DISCONTINUED | OUTPATIENT
Start: 2024-10-09 | End: 2024-10-09

## 2024-10-09 RX ORDER — ACETAMINOPHEN 325 MG
650 TABLET ORAL EVERY 6 HOURS
Refills: 0 | Status: DISCONTINUED | OUTPATIENT
Start: 2024-10-09 | End: 2024-10-09

## 2024-10-09 RX ORDER — SODIUM CHLORIDE IRRIG SOLUTION 0.9 %
1000 SOLUTION, IRRIGATION IRRIGATION
Refills: 0 | Status: DISCONTINUED | OUTPATIENT
Start: 2024-10-09 | End: 2024-10-09

## 2024-10-09 RX ORDER — PSYLLIUM HUSK 0.4 G
800 CAPSULE ORAL ONCE
Refills: 0 | Status: COMPLETED | OUTPATIENT
Start: 2024-10-09 | End: 2024-10-09

## 2024-10-09 RX ORDER — AMLODIPINE BESYLATE 5 MG
5 TABLET ORAL DAILY
Refills: 0 | Status: DISCONTINUED | OUTPATIENT
Start: 2024-10-09 | End: 2024-10-09

## 2024-10-09 RX ORDER — ALCOHOL ANTISEPTIC PADS
12.5 PADS, MEDICATED (EA) TOPICAL ONCE
Refills: 0 | Status: DISCONTINUED | OUTPATIENT
Start: 2024-10-09 | End: 2024-10-09

## 2024-10-09 RX ADMIN — Medication 800 MILLIGRAM(S): at 11:24

## 2024-10-09 RX ADMIN — Medication 400 MILLIGRAM(S): at 07:59

## 2024-10-09 NOTE — H&P ADULT - HISTORY OF PRESENT ILLNESS
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 with recommendation  for outpatient Colonoscopy, patient was cleared to restart Xarelto and Chronic Back Pain presents to [facility] ER this morning, 10/9/24, complaining of  weakness, chronic back pain and b/l leg pain with difficulty ambulating. During her evaluation, the patient reported experiencing three syncopal episodes in the last three days, with no preceding symptoms (prodrome) and uncertainty about how she fell or if she struck her head.  Patient   resides at home with her son and has a home health aide (HHA) for assistance.           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 with recommendation  for outpatient Colonoscopy, patient was cleared to restart Xarelto and Chronic Back Pain presents to [facility] ER this morning, 10/9/24, complaining of  weakness, chronic back pain and b/l leg pain with difficulty ambulating. During her evaluation, the patient reported experiencing three syncopal episodes in the last three days, with no preceding symptoms (prodrome) and uncertainty about how she fell or if she struck her head.  Patient   resides at home with her son and has a home health aide (HHA) 7 days a week  for assistance.    In ER ECG reveals NSR@70bpm with non specific ST-T wave changes, Troponin T HS 7 neg, H/H 9.4/30.7 Plts 228.  CXR bedside no acute pathology seen, mild vascular congestion, CT of Head fracture of right medial maxillary with almost complete opacification of right maxilla sinus.    VSS: Temp 99.6F; /88; HR 77; RR 18; O2 on RA 97%         ,

## 2024-10-09 NOTE — ED PROVIDER NOTE - PROGRESS NOTE DETAILS
darleen - received on sign out pending labs and cards admission  labs grossly unremarkable - baseline anemia. HS trop 7  admitted to cards for further monitoring. tsh and mag added.

## 2024-10-09 NOTE — ED PROVIDER NOTE - CLINICAL SUMMARY MEDICAL DECISION MAKING FREE TEXT BOX
Triage VS unremarkable  EKG NSR non ischemic  pt reports 3 syncopal episodes w/o prodrome in the last 3 days  plan for labs, trop, cxr, CTH  attempted to call #s in chart w/o success, left voicemail  anticipate cards admission for syncope

## 2024-10-09 NOTE — H&P ADULT - NSHPLABSRESULTS_GEN_ALL_CORE
9.4    5.88  )-----------( 228      ( 09 Oct 2024 02:29 )             30.7       10-09    x   |  x   |  x   ----------------------------<  x   4.8   |  x   |  x     Ca    9.0      09 Oct 2024 02:28                  Urinalysis Basic - ( 09 Oct 2024 02:28 )    Color: x / Appearance: x / SG: x / pH: x  Gluc: 82 mg/dL / Ketone: x  / Bili: x / Urobili: x   Blood: x / Protein: x / Nitrite: x   Leuk Esterase: x / RBC: x / WBC x   Sq Epi: x / Non Sq Epi: x / Bacteria: x        EKG: NSR@77bpm with non specific ST-T wave changes

## 2024-10-09 NOTE — H&P ADULT - PROBLEM SELECTOR PLAN 1
Telemetry ECG  Troponin T HS 7 neg  -f/u Orthostatics  -f/u labs including folate and Vitamin B-12  -f/u UA  -possible MOCT upon d/c

## 2024-10-09 NOTE — ED ADULT NURSE REASSESSMENT NOTE - NS ED NURSE REASSESS COMMENT FT1
Patient care endorsed to MINNIE Mcmillan at this time, patient is admitted and awaiting bed assignment. Vitals as charted.

## 2024-10-09 NOTE — ED ADULT NURSE NOTE - OBJECTIVE STATEMENT
Patient is a 74yoF presenting to the ED with back pain. Patient with hx of htn, dm, asthma, dvt on xarelto. Patient states that she hs chronic BL feet pain and back pain. Patient reports that the pain has been getting worse, ambulates with a walker per patient. Denies chest pain/sob.

## 2024-10-09 NOTE — H&P ADULT - NS ATTEND AMEND GEN_ALL_CORE FT
Ms. Hernandez is 74F with HTN, DM, DVT, PAD presented with fall/syncope and admitted for further work up.     Exam:  NAD  JVP normal  S1, S2 no murmur  WWP, no edema      Syncope- atypical, patient reports no LOC but maxilla fx. EKG benign, no troponins.Obtain TTE, monitor tele and PT evaluation.   Anemia- send iron studies. Denies bloody output. On blood thinner.   Falls- as above. PT evaluation, TTE as above  HTN- hypertensive, on lisinopril and amlodipine, gentle titration as tolerated/indicated.   Maxilla fx- OMFS consult

## 2024-10-09 NOTE — H&P ADULT - NSICDXPASTMEDICALHX_GEN_ALL_CORE_FT
PAST MEDICAL HISTORY:  Anemia     Asthma     Chronic back pain     DM2 (diabetes mellitus, type 2)     DVT, lower extremity     GIB (gastrointestinal bleeding)     HTN (hypertension)     PAD (peripheral artery disease)     Syncope

## 2024-10-09 NOTE — DISCHARGE NOTE PROVIDER - NSDCMRMEDTOKEN_GEN_ALL_CORE_FT
Albuterol (Eqv-ProAir HFA) 90 mcg/inh inhalation aerosol: 2 puff(s) inhaled every 6 hours as needed for  shortness of breath and/or wheezing  amLODIPine 5 mg oral tablet: 1 tab(s) orally once a day  ferrous sulfate 200 mg (65 mg elemental iron) oral tablet: 1 tab(s) orally every other day Please take one tablet on Mondays, Wednesdays and Fridays  lisinopril 40 mg oral tablet: 1 tab(s) orally once a day  metFORMIN 1000 mg oral tablet, extended release: 1 tab(s) orally once a day  Trelegy Ellipta 200 mcg-62.5 mcg-25 mcg/inh inhalation powder: 1 puff(s) inhaled once a day  Xarelto 20 mg oral tablet: 1 tab(s) orally once a day

## 2024-10-09 NOTE — ED PROVIDER NOTE - OBJECTIVE STATEMENT
74-year-old female with DM, HTN, asthma, prior DVT on Xarelto, PAD, DM, STARR, UGIB, comes to the ED for evaluation.  Per triage note, patient complaining of back pain and chronic bilateral feet pain with difficulty walking.  On my evaluation, patient states she has mild back pain and bilateral leg pain which is chronic.  States she feels generalized weakness with difficulty ambulating.  Also states that she had 3 syncopal episodes in the last 3 days, says that she does not know what happened and did not feel it coming on.  Says " I do not know, next think I know I was on the floor".  Does not know if she had any head strike.  Patient says she lives at home with her son.  Per chart review pt gets injections for knee pain, sees pain management and has a home attendant.  Patient seems like an unreliable historian, unclear details surrounding these potential syncopal episodes, will need to get collateral from family

## 2024-10-09 NOTE — DISCHARGE NOTE PROVIDER - NSDCCPCAREPLAN_GEN_ALL_CORE_FT
PRINCIPAL DISCHARGE DIAGNOSIS  Diagnosis: Syncope  Assessment and Plan of Treatment: Please follow-up with your Primary Care Doctor.     PRINCIPAL DISCHARGE DIAGNOSIS  Diagnosis: Syncope  Assessment and Plan of Treatment: You were found to have a Right mandibullar fracture.   Please follow-up as outpatient in OMFS/ENT clinic. Please call [phone]. Address: [address] [place] [address], [address], [place] [id number]   Please follow-up with your Primary Care Doctor.

## 2024-10-09 NOTE — ED PROVIDER NOTE - PHYSICAL EXAMINATION
CONST: nontoxic NAD speaking in full sentences  HEAD: atraumatic  EYES: conjunctivae clear, EOMI  NECK: supple nontender FROM  CARD: regular rate and rhythm  CHEST: breathing comfortably, no stridor/retractions/tripoding  ABD: soft nontender nondistended  EXT: FROM  SKIN: warm, dry  NEURO: awake alert answering questions following commands moving all extremities CONST: nontoxic NAD speaking in full sentences  HEAD: atraumatic  EYES: conjunctivae clear, EOMI  NECK: supple nontender FROM  CARD: regular rate and rhythm  CHEST: breathing comfortably, no stridor/retractions/tripoding  ABD: soft nontender nondistended  EXT: pelvis stable, hips nontender, FROM b/l LE  SKIN: warm, dry  NEURO: awake alert answering questions following commands moving all extremities

## 2024-10-09 NOTE — DISCHARGE NOTE PROVIDER - ATTENDING DISCHARGE PHYSICAL EXAMINATION:
Admitted for w/u of syncope/falls. Atypical symptoms EKG sinus rhythm, no ischemic changes, no evidence of events. Patient was pending TTE and PT evaluation but refusing. Discussed risk and reports understanding and still wants to be discharged.

## 2024-10-09 NOTE — CHART NOTE - NSCHARTNOTEFT_GEN_A_CORE
OMFS consulted regarding maxillary sinus fracture.    64F admitted to cardiology for syncopal workup. Hx of multiple syncopal episodes at home with +LOC, unknown head strike.   CT Maxillofacial demonstrated nondisplaced maxillary sinus fracture (right medial wall) w/opacification.   No reported dental trauma, or ocular findings.     Recommendations:   -Sinus precautions  -Augmentin 875mg BID u14nctb  -f/u outpatient in OMFS/ENT clinic. Please call [phone]. Address: [address] [place] [address], [address], [place] [address]     Manny Covarrubias  Oral and Maxillofacial Surgery  [facility] Pager #[phone]  [facility] Pager: [phone]  [facility] Pager: [phone]  Available on Teams

## 2024-10-09 NOTE — DISCHARGE NOTE NURSING/CASE MANAGEMENT/SOCIAL WORK - PATIENT PORTAL LINK FT
You can access the FollowMyHealth Patient Portal offered by [facility] by registering at the following website: [email or web address]. By joining [facility]’s FollowMyHealth portal, you will also be able to view your health information using other applications (apps) compatible with our system.

## 2024-10-09 NOTE — H&P ADULT - NSHPPHYSICALEXAM_GEN_ALL_CORE
T(C): 37.1 (10-09-24 @ 03:37), Max: 37.6 (10-08-24 @ 23:14)  HR: 62 (10-09-24 @ 03:37) (62 - 77)  BP: 149/73 (10-09-24 @ 03:37) (149/73 - 157/88)  RR: 17 (10-09-24 @ 03:37) (17 - 18)  SpO2: 94% (10-09-24 @ 03:37) (94% - 98%)  Wt(kg): --    Appearance: NAD  HEENT:   Normal oral mucosa, EOMI   with right medial maxillary fracture w/ almost complete opacification of right maxilla sinus  Neck: Supple, - JVD; No Carotid Bruit and 2+ pulses B/L  Cardiovascular: Normal S1 S2, No JVD, No murmurs  Respiratory: Lungs clear to auscultation//No Rales, Rhonchi, Wheezing	  Gastrointestinal:  Soft, Non-tender, + BS	  Skin: No rashes, No ecchymoses, No cyanosis  Extremities: Normal range of motion, No clubbing, cyanosis or edema  Vascular: Femoral pulses 2+ b/l without bruit, DP 1+ b/l, PT 1+ b/l  Neurologic: Non-focal  Psychiatry: A & O x 3, Mood & affect appropriate

## 2024-10-09 NOTE — DISCHARGE NOTE PROVIDER - HOSPITAL COURSE
74F with hx of HTN, DM2, Asthma, multiple DVTs (last 2/2023, on Xarelto), PAD, OA, STARR, and GIB, pesenting with weakness, chronic back pain, and LE pain with endorsement of syncope/presyncope with Head CT noting Rt medial maxillary wall fx. Undergoing syncope work-up.     Labs on admission unremarkable. The following imaging was done:   - Head CT (10/09/24): Negative for intracranial hemorrhage. Noted Rt medial maxillary wall fx w/ almost complete opacification of Rt maxillary sinus   - Maxillofacial CT (10/09/24): Extensive postsurgical changes about the sinonasal cavity with extensive residual mucosal thickening -- worst in Rt maxillary sinus     Given above findings, Maxillofacial Sx was consulted and recommended ENT follow-up as outpatient for sinus disease.     On 10/09, despite extensive discussions with primary team regarding further work-up and monitoring, pt decided to AMA on 10/09/24.

## 2024-10-09 NOTE — H&P ADULT - NSHPREVIEWOFSYSTEMS_GEN_ALL_CORE
GENERAL, CONSTITUTIONAL : denies recent weight loss, fever, chills  EYES, VISION: denies changes in vision   EARS, NOSE, THROAT: denies hearing loss  HEART, CARDIOVASCULAR: denies chest pain, arrhythmia, palpitations, SOB,  LE edema, claudication  RESPIRATORY: Denies cough, SOB, wheezing, PND, orthopnea  GASTROINTESTINAL: Denies abdominal pain, heartburn, bloody stool, dark tarry stool  hx of melena in past  GENITOURINARY: Denies frequent urination, urgency  MUSCULOSKELETAL admits to  joint pain , musculoskeletal pain. Back pain  SKIN & INTEGUMENTARY Denies rashes, sores, blisters, blisters, growths.  NEUROLOGICAL: Denies numbness or tingling sensations, sensation loss, burning.   PSYCHIATRIC: Denies nervousness, anxiety, depression  ENDOCRINE Denies heat or cold intolerance, excessive thirst  HEMATOLOGIC/LYMPHATIC: hx of GIB with no source  abnormal bleeding of any kind  Anemia

## 2024-10-17 DIAGNOSIS — J45.909 UNSPECIFIED ASTHMA, UNCOMPLICATED: ICD-10-CM

## 2024-10-17 DIAGNOSIS — M19.90 UNSPECIFIED OSTEOARTHRITIS, UNSPECIFIED SITE: ICD-10-CM

## 2024-10-17 DIAGNOSIS — S02.40CA MAXILLARY FRACTURE, RIGHT SIDE, INITIAL ENCOUNTER FOR CLOSED FRACTURE: ICD-10-CM

## 2024-10-17 DIAGNOSIS — Z79.4 LONG TERM (CURRENT) USE OF INSULIN: ICD-10-CM

## 2024-10-17 DIAGNOSIS — Z88.6 ALLERGY STATUS TO ANALGESIC AGENT: ICD-10-CM

## 2024-10-17 DIAGNOSIS — D50.9 IRON DEFICIENCY ANEMIA, UNSPECIFIED: ICD-10-CM

## 2024-10-17 DIAGNOSIS — Z86.79 PERSONAL HISTORY OF OTHER DISEASES OF THE CIRCULATORY SYSTEM: ICD-10-CM

## 2024-10-17 DIAGNOSIS — M79.671 PAIN IN RIGHT FOOT: ICD-10-CM

## 2024-10-17 DIAGNOSIS — S02.601A FRACTURE OF UNSPECIFIED PART OF BODY OF RIGHT MANDIBLE, INITIAL ENCOUNTER FOR CLOSED FRACTURE: ICD-10-CM

## 2024-10-17 DIAGNOSIS — R53.1 WEAKNESS: ICD-10-CM

## 2024-10-17 DIAGNOSIS — M54.9 DORSALGIA, UNSPECIFIED: ICD-10-CM

## 2024-10-17 DIAGNOSIS — R55 SYNCOPE AND COLLAPSE: ICD-10-CM

## 2024-10-17 DIAGNOSIS — E11.9 TYPE 2 DIABETES MELLITUS WITHOUT COMPLICATIONS: ICD-10-CM

## 2024-10-17 DIAGNOSIS — Z86.718 PERSONAL HISTORY OF OTHER VENOUS THROMBOSIS AND EMBOLISM: ICD-10-CM

## 2024-10-17 DIAGNOSIS — Z79.01 LONG TERM (CURRENT) USE OF ANTICOAGULANTS: ICD-10-CM

## 2024-10-17 DIAGNOSIS — I10 ESSENTIAL (PRIMARY) HYPERTENSION: ICD-10-CM

## 2024-10-17 DIAGNOSIS — Z88.0 ALLERGY STATUS TO PENICILLIN: ICD-10-CM

## 2024-10-17 DIAGNOSIS — Z79.51 LONG TERM (CURRENT) USE OF INHALED STEROIDS: ICD-10-CM

## 2024-10-17 DIAGNOSIS — Z87.19 PERSONAL HISTORY OF OTHER DISEASES OF THE DIGESTIVE SYSTEM: ICD-10-CM

## 2024-10-17 DIAGNOSIS — M79.672 PAIN IN LEFT FOOT: ICD-10-CM

## 2024-11-20 ENCOUNTER — APPOINTMENT (OUTPATIENT)
Dept: INTERNAL MEDICINE | Facility: CLINIC | Age: 74
End: 2024-11-20
Payer: MEDICARE

## 2024-11-20 VITALS
TEMPERATURE: 98.3 F | HEIGHT: 62 IN | BODY MASS INDEX: 32.02 KG/M2 | DIASTOLIC BLOOD PRESSURE: 70 MMHG | WEIGHT: 174 LBS | OXYGEN SATURATION: 97 % | HEART RATE: 68 BPM | SYSTOLIC BLOOD PRESSURE: 110 MMHG

## 2024-11-20 DIAGNOSIS — Z13.220 ENCOUNTER FOR SCREENING FOR LIPOID DISORDERS: ICD-10-CM

## 2024-11-20 DIAGNOSIS — J45.909 UNSPECIFIED ASTHMA, UNCOMPLICATED: ICD-10-CM

## 2024-11-20 DIAGNOSIS — D50.9 IRON DEFICIENCY ANEMIA, UNSPECIFIED: ICD-10-CM

## 2024-11-20 DIAGNOSIS — E53.8 DEFICIENCY OF OTHER SPECIFIED B GROUP VITAMINS: ICD-10-CM

## 2024-11-20 DIAGNOSIS — E11.9 TYPE 2 DIABETES MELLITUS W/OUT COMPLICATIONS: ICD-10-CM

## 2024-11-20 PROCEDURE — 36415 COLL VENOUS BLD VENIPUNCTURE: CPT

## 2024-11-20 PROCEDURE — 99214 OFFICE O/P EST MOD 30 MIN: CPT | Mod: 25

## 2024-11-20 PROCEDURE — 96372 THER/PROPH/DIAG INJ SC/IM: CPT

## 2024-11-22 RX ORDER — CYANOCOBALAMIN 1000 UG/ML
1000 INJECTION, SOLUTION INTRAMUSCULAR; SUBCUTANEOUS
Qty: 0 | Refills: 0 | Status: COMPLETED | OUTPATIENT
Start: 2024-11-22

## 2024-11-22 RX ADMIN — CYANOCOBALAMIN 0 MCG/ML: 1000 INJECTION, SOLUTION INTRAMUSCULAR; SUBCUTANEOUS at 00:00

## 2024-11-26 LAB
25(OH)D3 SERPL-MCNC: 48.9 NG/ML
ALBUMIN SERPL ELPH-MCNC: 4.3 G/DL
ALP BLD-CCNC: 90 U/L
ALT SERPL-CCNC: 5 U/L
ANION GAP SERPL CALC-SCNC: 13 MMOL/L
AST SERPL-CCNC: 15 U/L
BASOPHILS # BLD AUTO: 0.01 K/UL
BASOPHILS NFR BLD AUTO: 0.2 %
BILIRUB SERPL-MCNC: 0.2 MG/DL
BUN SERPL-MCNC: 14 MG/DL
CALCIUM SERPL-MCNC: 9.5 MG/DL
CHLORIDE SERPL-SCNC: 104 MMOL/L
CO2 SERPL-SCNC: 25 MMOL/L
CREAT SERPL-MCNC: 0.9 MG/DL
EGFR: 68 ML/MIN/1.73M2
EOSINOPHIL # BLD AUTO: 0.2 K/UL
EOSINOPHIL NFR BLD AUTO: 4.7 %
ESTIMATED AVERAGE GLUCOSE: 114 MG/DL
FERRITIN SERPL-MCNC: 16 NG/ML
FOLATE SERPL-MCNC: 6 NG/ML
GLUCOSE SERPL-MCNC: 94 MG/DL
HBA1C MFR BLD HPLC: 5.6 %
HCT VFR BLD CALC: 35.2 %
HGB BLD-MCNC: 10.8 G/DL
IMM GRANULOCYTES NFR BLD AUTO: 0.2 %
IRON SATN MFR SERPL: 12 %
IRON SERPL-MCNC: 44 UG/DL
LYMPHOCYTES # BLD AUTO: 1.65 K/UL
LYMPHOCYTES NFR BLD AUTO: 38.4 %
MAN DIFF?: NORMAL
MCHC RBC-ENTMCNC: 23.6 PG
MCHC RBC-ENTMCNC: 30.7 G/DL
MCV RBC AUTO: 77 FL
MONOCYTES # BLD AUTO: 0.47 K/UL
MONOCYTES NFR BLD AUTO: 10.9 %
NEUTROPHILS # BLD AUTO: 1.96 K/UL
NEUTROPHILS NFR BLD AUTO: 45.6 %
PLATELET # BLD AUTO: 217 K/UL
POTASSIUM SERPL-SCNC: 4.3 MMOL/L
PROT SERPL-MCNC: 7.9 G/DL
RBC # BLD: 4.57 M/UL
RBC # FLD: 17.6 %
SODIUM SERPL-SCNC: 142 MMOL/L
TIBC SERPL-MCNC: 364 UG/DL
UIBC SERPL-MCNC: 320 UG/DL
VIT B12 SERPL-MCNC: 386 PG/ML
WBC # FLD AUTO: 4.3 K/UL

## 2024-12-19 ENCOUNTER — APPOINTMENT (OUTPATIENT)
Dept: INTERNAL MEDICINE | Facility: CLINIC | Age: 74
End: 2024-12-19
Payer: MEDICARE

## 2024-12-19 PROCEDURE — 96372 THER/PROPH/DIAG INJ SC/IM: CPT

## 2024-12-19 RX ORDER — CYANOCOBALAMIN 1000 UG/ML
1000 INJECTION, SOLUTION INTRAMUSCULAR; SUBCUTANEOUS
Qty: 1 | Refills: 0 | Status: COMPLETED | OUTPATIENT
Start: 2024-12-19

## 2025-01-16 ENCOUNTER — NON-APPOINTMENT (OUTPATIENT)
Age: 75
End: 2025-01-16

## 2025-01-16 DIAGNOSIS — Z78.9 OTHER SPECIFIED HEALTH STATUS: ICD-10-CM

## 2025-01-16 DIAGNOSIS — Z86.79 PERSONAL HISTORY OF OTHER DISEASES OF THE CIRCULATORY SYSTEM: ICD-10-CM

## 2025-01-16 DIAGNOSIS — Z87.891 PERSONAL HISTORY OF NICOTINE DEPENDENCE: ICD-10-CM

## 2025-01-16 DIAGNOSIS — M72.2 PLANTAR FASCIAL FIBROMATOSIS: ICD-10-CM

## 2025-01-16 RX ORDER — METHYLPREDNISOLONE 4 MG/1
4 TABLET ORAL
Refills: 0 | Status: ACTIVE | COMMUNITY

## 2025-01-28 ENCOUNTER — NON-APPOINTMENT (OUTPATIENT)
Age: 75
End: 2025-01-28

## 2025-01-28 ENCOUNTER — APPOINTMENT (OUTPATIENT)
Dept: INTERNAL MEDICINE | Facility: CLINIC | Age: 75
End: 2025-01-28
Payer: MEDICARE

## 2025-01-28 ENCOUNTER — APPOINTMENT (OUTPATIENT)
Dept: GASTROENTEROLOGY | Facility: CLINIC | Age: 75
End: 2025-01-28
Payer: MEDICARE

## 2025-01-28 VITALS
WEIGHT: 179 LBS | DIASTOLIC BLOOD PRESSURE: 80 MMHG | OXYGEN SATURATION: 97 % | HEART RATE: 69 BPM | SYSTOLIC BLOOD PRESSURE: 150 MMHG | HEIGHT: 62 IN | BODY MASS INDEX: 32.94 KG/M2 | TEMPERATURE: 97.3 F

## 2025-01-28 DIAGNOSIS — A04.8 OTHER SPECIFIED BACTERIAL INTESTINAL INFECTIONS: ICD-10-CM

## 2025-01-28 DIAGNOSIS — D50.9 IRON DEFICIENCY ANEMIA, UNSPECIFIED: ICD-10-CM

## 2025-01-28 DIAGNOSIS — Z12.11 ENCOUNTER FOR SCREENING FOR MALIGNANT NEOPLASM OF COLON: ICD-10-CM

## 2025-01-28 PROCEDURE — 96372 THER/PROPH/DIAG INJ SC/IM: CPT

## 2025-01-28 PROCEDURE — 99215 OFFICE O/P EST HI 40 MIN: CPT

## 2025-01-28 PROCEDURE — G2211 COMPLEX E/M VISIT ADD ON: CPT

## 2025-01-28 RX ORDER — CYANOCOBALAMIN 1000 UG/ML
1000 INJECTION, SOLUTION INTRAMUSCULAR; SUBCUTANEOUS
Qty: 0 | Refills: 0 | Status: COMPLETED | OUTPATIENT
Start: 2025-01-28

## 2025-01-28 RX ORDER — OMEPRAZOLE 40 MG/1
40 CAPSULE, DELAYED RELEASE ORAL TWICE DAILY
Qty: 28 | Refills: 0 | Status: ACTIVE | COMMUNITY
Start: 2025-01-28 | End: 1900-01-01

## 2025-01-28 RX ORDER — LEVOFLOXACIN 500 MG/1
500 TABLET, FILM COATED ORAL
Qty: 14 | Refills: 0 | Status: ACTIVE | COMMUNITY
Start: 2025-01-28 | End: 1900-01-01

## 2025-01-28 RX ORDER — METRONIDAZOLE 500 MG/1
500 TABLET ORAL 3 TIMES DAILY
Qty: 42 | Refills: 0 | Status: ACTIVE | COMMUNITY
Start: 2025-01-28 | End: 1900-01-01

## 2025-03-18 ENCOUNTER — APPOINTMENT (OUTPATIENT)
Dept: INTERNAL MEDICINE | Facility: CLINIC | Age: 75
End: 2025-03-18
Payer: MEDICARE

## 2025-03-18 VITALS
TEMPERATURE: 95.4 F | DIASTOLIC BLOOD PRESSURE: 68 MMHG | HEIGHT: 62 IN | HEART RATE: 66 BPM | WEIGHT: 182 LBS | OXYGEN SATURATION: 97 % | BODY MASS INDEX: 33.49 KG/M2 | SYSTOLIC BLOOD PRESSURE: 109 MMHG

## 2025-03-18 DIAGNOSIS — D50.9 IRON DEFICIENCY ANEMIA, UNSPECIFIED: ICD-10-CM

## 2025-03-18 DIAGNOSIS — J45.909 UNSPECIFIED ASTHMA, UNCOMPLICATED: ICD-10-CM

## 2025-03-18 DIAGNOSIS — A04.8 OTHER SPECIFIED BACTERIAL INTESTINAL INFECTIONS: ICD-10-CM

## 2025-03-18 DIAGNOSIS — K21.9 GASTRO-ESOPHAGEAL REFLUX DISEASE W/OUT ESOPHAGITIS: ICD-10-CM

## 2025-03-18 DIAGNOSIS — E53.8 DEFICIENCY OF OTHER SPECIFIED B GROUP VITAMINS: ICD-10-CM

## 2025-03-18 DIAGNOSIS — E11.9 TYPE 2 DIABETES MELLITUS W/OUT COMPLICATIONS: ICD-10-CM

## 2025-03-18 PROCEDURE — 99214 OFFICE O/P EST MOD 30 MIN: CPT | Mod: 25

## 2025-03-18 PROCEDURE — 96372 THER/PROPH/DIAG INJ SC/IM: CPT

## 2025-03-18 RX ORDER — CYANOCOBALAMIN 1000 UG/ML
1000 INJECTION, SOLUTION INTRAMUSCULAR; SUBCUTANEOUS
Qty: 0 | Refills: 0 | Status: COMPLETED | OUTPATIENT
Start: 2025-03-18

## 2025-03-18 RX ORDER — CYANOCOBALAMIN (VITAMIN B-12) 1000 MCG
1000 TABLET ORAL DAILY
Qty: 90 | Refills: 1 | Status: ACTIVE | COMMUNITY
Start: 2025-03-18 | End: 1900-01-01

## 2025-03-18 RX ADMIN — CYANOCOBALAMIN 0 MCG/ML: 1000 INJECTION, SOLUTION INTRAMUSCULAR at 00:00

## 2025-03-24 ENCOUNTER — APPOINTMENT (OUTPATIENT)
Dept: GASTROENTEROLOGY | Facility: CLINIC | Age: 75
End: 2025-03-24

## 2025-03-26 DIAGNOSIS — J32.9 CHRONIC SINUSITIS, UNSPECIFIED: ICD-10-CM

## 2025-03-26 DIAGNOSIS — Z87.09 PERSONAL HISTORY OF OTHER DISEASES OF THE RESPIRATORY SYSTEM: ICD-10-CM

## 2025-03-26 DIAGNOSIS — J33.9 CHRONIC SINUSITIS, UNSPECIFIED: ICD-10-CM

## 2025-03-26 RX ORDER — DUPILUMAB 300 MG/2ML
300 INJECTION, SOLUTION SUBCUTANEOUS
Qty: 2 | Refills: 2 | Status: ACTIVE | COMMUNITY
Start: 2025-03-26 | End: 1900-01-01

## 2025-05-12 DIAGNOSIS — F32.A DEPRESSION, UNSPECIFIED: ICD-10-CM

## 2025-05-12 RX ORDER — BUPROPION HYDROCHLORIDE 150 MG/1
150 TABLET, EXTENDED RELEASE ORAL DAILY
Qty: 1 | Refills: 1 | Status: ACTIVE | COMMUNITY
Start: 2025-05-12 | End: 1900-01-01

## 2025-05-12 NOTE — DISCHARGE NOTE PROVIDER - DISCHARGE DATE
Go for blood tests as directed. Your doctor will do lab tests at regular visits to monitor the effects of this medicine. Please follow up with your doctor and keep your health care provider appointments. 02-May-2023 03-May-2023

## 2025-07-08 ENCOUNTER — RX RENEWAL (OUTPATIENT)
Age: 75
End: 2025-07-08

## 2025-07-08 RX ORDER — BLOOD SUGAR DIAGNOSTIC
STRIP MISCELLANEOUS
Qty: 1 | Refills: 4 | Status: ACTIVE | COMMUNITY
Start: 2025-07-08 | End: 1900-01-01

## 2025-07-25 ENCOUNTER — APPOINTMENT (OUTPATIENT)
Dept: INTERNAL MEDICINE | Facility: CLINIC | Age: 75
End: 2025-07-25
Payer: MEDICARE

## 2025-07-25 VITALS
TEMPERATURE: 97.4 F | DIASTOLIC BLOOD PRESSURE: 76 MMHG | BODY MASS INDEX: 34.04 KG/M2 | HEIGHT: 62 IN | OXYGEN SATURATION: 98 % | HEART RATE: 71 BPM | SYSTOLIC BLOOD PRESSURE: 139 MMHG | WEIGHT: 185 LBS

## 2025-07-25 DIAGNOSIS — A04.8 OTHER SPECIFIED BACTERIAL INTESTINAL INFECTIONS: ICD-10-CM

## 2025-07-25 DIAGNOSIS — Z12.11 ENCOUNTER FOR SCREENING FOR MALIGNANT NEOPLASM OF COLON: ICD-10-CM

## 2025-07-25 DIAGNOSIS — F41.9 ANXIETY DISORDER, UNSPECIFIED: ICD-10-CM

## 2025-07-25 DIAGNOSIS — E11.9 TYPE 2 DIABETES MELLITUS W/OUT COMPLICATIONS: ICD-10-CM

## 2025-07-25 DIAGNOSIS — D50.9 IRON DEFICIENCY ANEMIA, UNSPECIFIED: ICD-10-CM

## 2025-07-25 DIAGNOSIS — J45.909 UNSPECIFIED ASTHMA, UNCOMPLICATED: ICD-10-CM

## 2025-07-25 DIAGNOSIS — E53.8 DEFICIENCY OF OTHER SPECIFIED B GROUP VITAMINS: ICD-10-CM

## 2025-07-25 PROCEDURE — 99214 OFFICE O/P EST MOD 30 MIN: CPT | Mod: 25

## 2025-07-25 PROCEDURE — 96372 THER/PROPH/DIAG INJ SC/IM: CPT

## 2025-07-25 PROCEDURE — 36415 COLL VENOUS BLD VENIPUNCTURE: CPT

## 2025-07-25 RX ADMIN — CYANOCOBALAMIN 0 MCG/ML: 1000 INJECTION, SOLUTION INTRAMUSCULAR; SUBCUTANEOUS at 00:00

## 2025-07-29 RX ORDER — CLONAZEPAM 0.5 MG/1
0.5 TABLET ORAL
Qty: 16 | Refills: 0 | Status: ACTIVE | COMMUNITY
Start: 2025-07-25 | End: 1900-01-01

## 2025-07-31 LAB
25(OH)D3 SERPL-MCNC: 25.5 NG/ML
ALBUMIN SERPL ELPH-MCNC: 4.6 G/DL
ALP BLD-CCNC: 101 U/L
ALT SERPL-CCNC: 15 U/L
ANION GAP SERPL CALC-SCNC: 16 MMOL/L
AST SERPL-CCNC: 24 U/L
BASOPHILS # BLD AUTO: 0.03 K/UL
BASOPHILS NFR BLD AUTO: 0.5 %
BILIRUB SERPL-MCNC: 0.3 MG/DL
BUN SERPL-MCNC: 15 MG/DL
CALCIUM SERPL-MCNC: 9.6 MG/DL
CHLORIDE SERPL-SCNC: 106 MMOL/L
CHOLEST SERPL-MCNC: 254 MG/DL
CO2 SERPL-SCNC: 22 MMOL/L
CREAT SERPL-MCNC: 1.27 MG/DL
EGFRCR SERPLBLD CKD-EPI 2021: 44 ML/MIN/1.73M2
EOSINOPHIL # BLD AUTO: 0.14 K/UL
EOSINOPHIL NFR BLD AUTO: 2.2 %
ESTIMATED AVERAGE GLUCOSE: 123 MG/DL
FERRITIN SERPL-MCNC: 30 NG/ML
FOLATE SERPL-MCNC: 8.2 NG/ML
GLUCOSE SERPL-MCNC: 121 MG/DL
HBA1C MFR BLD HPLC: 5.9 %
HCT VFR BLD CALC: 38.3 %
HDLC SERPL-MCNC: 49 MG/DL
HGB BLD-MCNC: 11.7 G/DL
IMM GRANULOCYTES NFR BLD AUTO: 0.5 %
IRON SATN MFR SERPL: 13 %
IRON SERPL-MCNC: 50 UG/DL
LDLC SERPL-MCNC: 149 MG/DL
LYMPHOCYTES # BLD AUTO: 1.16 K/UL
LYMPHOCYTES NFR BLD AUTO: 18.4 %
MAN DIFF?: NORMAL
MCHC RBC-ENTMCNC: 26.5 PG
MCHC RBC-ENTMCNC: 30.5 G/DL
MCV RBC AUTO: 86.7 FL
MONOCYTES # BLD AUTO: 0.57 K/UL
MONOCYTES NFR BLD AUTO: 9.1 %
NEUTROPHILS # BLD AUTO: 4.36 K/UL
NEUTROPHILS NFR BLD AUTO: 69.3 %
NONHDLC SERPL-MCNC: 205 MG/DL
PLATELET # BLD AUTO: 186 K/UL
POTASSIUM SERPL-SCNC: 4.2 MMOL/L
PROT SERPL-MCNC: 8.4 G/DL
RBC # BLD: 4.42 M/UL
RBC # FLD: 15.3 %
SODIUM SERPL-SCNC: 145 MMOL/L
TIBC SERPL-MCNC: 385 UG/DL
TRIGL SERPL-MCNC: 308 MG/DL
TSH SERPL-ACNC: 3.53 UIU/ML
UIBC SERPL-MCNC: 335 UG/DL
VIT B12 SERPL-MCNC: >2000 PG/ML
WBC # FLD AUTO: 6.29 K/UL

## 2025-08-14 ENCOUNTER — RX RENEWAL (OUTPATIENT)
Age: 75
End: 2025-08-14

## 2025-08-26 ENCOUNTER — APPOINTMENT (OUTPATIENT)
Dept: INTERNAL MEDICINE | Facility: CLINIC | Age: 75
End: 2025-08-26
Payer: MEDICARE

## 2025-08-26 VITALS
WEIGHT: 184 LBS | BODY MASS INDEX: 33.86 KG/M2 | HEART RATE: 74 BPM | OXYGEN SATURATION: 97 % | DIASTOLIC BLOOD PRESSURE: 73 MMHG | TEMPERATURE: 96.9 F | HEIGHT: 62 IN | SYSTOLIC BLOOD PRESSURE: 137 MMHG

## 2025-08-26 DIAGNOSIS — I82.512 CHRONIC EMBOLISM AND THROMBOSIS OF LEFT FEMORAL VEIN: ICD-10-CM

## 2025-08-26 DIAGNOSIS — F41.9 ANXIETY DISORDER, UNSPECIFIED: ICD-10-CM

## 2025-08-26 DIAGNOSIS — K21.9 GASTRO-ESOPHAGEAL REFLUX DISEASE W/OUT ESOPHAGITIS: ICD-10-CM

## 2025-08-26 DIAGNOSIS — E53.8 DEFICIENCY OF OTHER SPECIFIED B GROUP VITAMINS: ICD-10-CM

## 2025-08-26 DIAGNOSIS — A04.8 OTHER SPECIFIED BACTERIAL INTESTINAL INFECTIONS: ICD-10-CM

## 2025-08-26 DIAGNOSIS — E11.9 TYPE 2 DIABETES MELLITUS W/OUT COMPLICATIONS: ICD-10-CM

## 2025-08-26 DIAGNOSIS — M79.606 PAIN IN LEG, UNSPECIFIED: ICD-10-CM

## 2025-08-26 PROCEDURE — 99214 OFFICE O/P EST MOD 30 MIN: CPT | Mod: 25

## 2025-08-26 PROCEDURE — 96372 THER/PROPH/DIAG INJ SC/IM: CPT

## 2025-08-26 RX ORDER — GABAPENTIN 300 MG/1
300 CAPSULE ORAL 3 TIMES DAILY
Qty: 270 | Refills: 1 | Status: ACTIVE | COMMUNITY
Start: 2025-08-26 | End: 1900-01-01

## 2025-08-26 RX ADMIN — CYANOCOBALAMIN 0 MCG/ML: 1000 INJECTION, SOLUTION INTRAMUSCULAR; SUBCUTANEOUS at 00:00

## 2025-09-26 PROBLEM — H10.9 CONJUNCTIVITIS: Status: ACTIVE | Noted: 2025-09-26
